# Patient Record
Sex: FEMALE | Race: WHITE | NOT HISPANIC OR LATINO | URBAN - METROPOLITAN AREA
[De-identification: names, ages, dates, MRNs, and addresses within clinical notes are randomized per-mention and may not be internally consistent; named-entity substitution may affect disease eponyms.]

---

## 2017-01-21 ENCOUNTER — INPATIENT (INPATIENT)
Facility: HOSPITAL | Age: 42
LOS: 17 days | Discharge: HOME | End: 2017-02-08
Attending: PSYCHIATRY & NEUROLOGY | Admitting: PSYCHIATRY & NEUROLOGY
Payer: SELF-PAY

## 2017-01-21 VITALS — WEIGHT: 200.62 LBS | TEMPERATURE: 98 F | HEIGHT: 66 IN

## 2017-01-21 DIAGNOSIS — F39 UNSPECIFIED MOOD [AFFECTIVE] DISORDER: ICD-10-CM

## 2017-01-21 RX ORDER — NICOTINE POLACRILEX 2 MG
2 GUM BUCCAL
Qty: 0 | Refills: 0 | Status: DISCONTINUED | OUTPATIENT
Start: 2017-01-21 | End: 2017-02-08

## 2017-01-21 RX ORDER — VENLAFAXINE HCL 75 MG
225 CAPSULE, EXT RELEASE 24 HR ORAL DAILY
Qty: 0 | Refills: 0 | Status: DISCONTINUED | OUTPATIENT
Start: 2017-01-21 | End: 2017-02-08

## 2017-01-21 RX ORDER — PANTOPRAZOLE SODIUM 20 MG/1
40 TABLET, DELAYED RELEASE ORAL
Qty: 0 | Refills: 0 | Status: DISCONTINUED | OUTPATIENT
Start: 2017-01-21 | End: 2017-02-08

## 2017-01-21 RX ORDER — ALBUTEROL 90 UG/1
2 AEROSOL, METERED ORAL EVERY 6 HOURS
Qty: 0 | Refills: 0 | Status: DISCONTINUED | OUTPATIENT
Start: 2017-01-21 | End: 2017-02-08

## 2017-01-21 RX ORDER — FERROUS SULFATE 325(65) MG
325 TABLET ORAL DAILY
Qty: 0 | Refills: 0 | Status: DISCONTINUED | OUTPATIENT
Start: 2017-01-21 | End: 2017-02-08

## 2017-01-21 RX ORDER — LANOLIN ALCOHOL/MO/W.PET/CERES
6 CREAM (GRAM) TOPICAL AT BEDTIME
Qty: 0 | Refills: 0 | Status: DISCONTINUED | OUTPATIENT
Start: 2017-01-21 | End: 2017-02-08

## 2017-01-21 RX ORDER — NICOTINE POLACRILEX 2 MG
2 GUM BUCCAL
Qty: 0 | Refills: 0 | Status: DISCONTINUED | OUTPATIENT
Start: 2017-01-21 | End: 2017-01-21

## 2017-01-21 RX ORDER — QUETIAPINE FUMARATE 200 MG/1
300 TABLET, FILM COATED ORAL AT BEDTIME
Qty: 0 | Refills: 0 | Status: DISCONTINUED | OUTPATIENT
Start: 2017-01-21 | End: 2017-02-01

## 2017-01-21 RX ORDER — NICOTINE POLACRILEX 2 MG
1 GUM BUCCAL DAILY
Qty: 0 | Refills: 0 | Status: DISCONTINUED | OUTPATIENT
Start: 2017-01-21 | End: 2017-02-08

## 2017-01-21 RX ORDER — OXYCODONE HYDROCHLORIDE 5 MG/1
15 TABLET ORAL ONCE
Qty: 0 | Refills: 0 | Status: DISCONTINUED | OUTPATIENT
Start: 2017-01-21 | End: 2017-01-21

## 2017-01-21 RX ORDER — OXYCODONE HYDROCHLORIDE 5 MG/1
30 TABLET ORAL DAILY
Qty: 0 | Refills: 0 | Status: DISCONTINUED | OUTPATIENT
Start: 2017-01-21 | End: 2017-01-23

## 2017-01-21 RX ORDER — HYDROXYZINE HCL 10 MG
50 TABLET ORAL EVERY 6 HOURS
Qty: 0 | Refills: 0 | Status: DISCONTINUED | OUTPATIENT
Start: 2017-01-21 | End: 2017-02-08

## 2017-01-21 RX ORDER — TRAZODONE HCL 50 MG
100 TABLET ORAL AT BEDTIME
Qty: 0 | Refills: 0 | Status: DISCONTINUED | OUTPATIENT
Start: 2017-01-21 | End: 2017-02-08

## 2017-01-21 RX ADMIN — ALBUTEROL 2 PUFF(S): 90 AEROSOL, METERED ORAL at 09:30

## 2017-01-21 RX ADMIN — Medication 1 PATCH: at 08:13

## 2017-01-21 RX ADMIN — Medication 325 MILLIGRAM(S): at 08:13

## 2017-01-21 RX ADMIN — QUETIAPINE FUMARATE 300 MILLIGRAM(S): 200 TABLET, FILM COATED ORAL at 20:30

## 2017-01-21 RX ADMIN — Medication 500 MILLIGRAM(S): at 21:30

## 2017-01-21 RX ADMIN — Medication 225 MILLIGRAM(S): at 08:13

## 2017-01-21 RX ADMIN — OXYCODONE HYDROCHLORIDE 15 MILLIGRAM(S): 5 TABLET ORAL at 16:13

## 2017-01-21 RX ADMIN — Medication 100 MILLIGRAM(S): at 02:44

## 2017-01-21 RX ADMIN — OXYCODONE HYDROCHLORIDE 30 MILLIGRAM(S): 5 TABLET ORAL at 09:13

## 2017-01-21 RX ADMIN — PANTOPRAZOLE SODIUM 40 MILLIGRAM(S): 20 TABLET, DELAYED RELEASE ORAL at 08:13

## 2017-01-21 RX ADMIN — Medication 500 MILLIGRAM(S): at 20:30

## 2017-01-21 RX ADMIN — OXYCODONE HYDROCHLORIDE 15 MILLIGRAM(S): 5 TABLET ORAL at 17:23

## 2017-01-21 RX ADMIN — OXYCODONE HYDROCHLORIDE 30 MILLIGRAM(S): 5 TABLET ORAL at 08:43

## 2017-01-22 LAB
ALBUMIN SERPL ELPH-MCNC: 3.8 G/DL — SIGNIFICANT CHANGE UP (ref 3.3–5)
ALP SERPL-CCNC: 59 U/L — SIGNIFICANT CHANGE UP (ref 40–120)
ALT FLD-CCNC: 15 U/L — SIGNIFICANT CHANGE UP (ref 4–33)
AMPHET UR-MCNC: NEGATIVE — SIGNIFICANT CHANGE UP
ANISOCYTOSIS BLD QL: SLIGHT — SIGNIFICANT CHANGE UP
APPEARANCE UR: CLEAR — SIGNIFICANT CHANGE UP
AST SERPL-CCNC: 12 U/L — SIGNIFICANT CHANGE UP (ref 4–32)
BACTERIA # UR AUTO: SIGNIFICANT CHANGE UP
BARBITURATES UR SCN-MCNC: NEGATIVE — SIGNIFICANT CHANGE UP
BASOPHILS # BLD AUTO: 0.04 K/UL — SIGNIFICANT CHANGE UP (ref 0–0.2)
BASOPHILS NFR BLD AUTO: 0.7 % — SIGNIFICANT CHANGE UP (ref 0–2)
BENZODIAZ UR-MCNC: NEGATIVE — SIGNIFICANT CHANGE UP
BILIRUB SERPL-MCNC: < 0.2 MG/DL — LOW (ref 0.2–1.2)
BILIRUB UR-MCNC: NEGATIVE — SIGNIFICANT CHANGE UP
BLOOD UR QL VISUAL: NEGATIVE — SIGNIFICANT CHANGE UP
BUN SERPL-MCNC: 17 MG/DL — SIGNIFICANT CHANGE UP (ref 7–23)
CALCIUM SERPL-MCNC: 8.8 MG/DL — SIGNIFICANT CHANGE UP (ref 8.4–10.5)
CANNABINOIDS UR-MCNC: POSITIVE — SIGNIFICANT CHANGE UP
CHLORIDE SERPL-SCNC: 102 MMOL/L — SIGNIFICANT CHANGE UP (ref 98–107)
CHOLEST SERPL-MCNC: 136 MG/DL — SIGNIFICANT CHANGE UP (ref 120–199)
CO2 SERPL-SCNC: 24 MMOL/L — SIGNIFICANT CHANGE UP (ref 22–31)
COCAINE METAB.OTHER UR-MCNC: NEGATIVE — SIGNIFICANT CHANGE UP
COLOR SPEC: YELLOW — SIGNIFICANT CHANGE UP
CREAT SERPL-MCNC: 0.5 MG/DL — SIGNIFICANT CHANGE UP (ref 0.5–1.3)
EOSINOPHIL # BLD AUTO: 0.17 K/UL — SIGNIFICANT CHANGE UP (ref 0–0.5)
EOSINOPHIL NFR BLD AUTO: 2.8 % — SIGNIFICANT CHANGE UP (ref 0–6)
FERRITIN SERPL-MCNC: 6.42 NG/ML — LOW (ref 15–150)
FOLATE SERPL-MCNC: 5.1 NG/ML — SIGNIFICANT CHANGE UP (ref 4.7–20)
GLUCOSE SERPL-MCNC: 82 MG/DL — SIGNIFICANT CHANGE UP (ref 70–99)
GLUCOSE UR-MCNC: NEGATIVE — SIGNIFICANT CHANGE UP
HBA1C BLD-MCNC: 5.6 % — SIGNIFICANT CHANGE UP (ref 4–5.6)
HCG SERPL-ACNC: < 5 MIU/ML — SIGNIFICANT CHANGE UP
HCT VFR BLD CALC: 34.4 % — LOW (ref 34.5–45)
HDLC SERPL-MCNC: 44 MG/DL — LOW (ref 45–65)
HGB BLD-MCNC: 9.7 G/DL — LOW (ref 11.5–15.5)
HYPOCHROMIA BLD QL: SLIGHT — SIGNIFICANT CHANGE UP
IMM GRANULOCYTES NFR BLD AUTO: 0.2 % — SIGNIFICANT CHANGE UP (ref 0–1.5)
IRON SATN MFR SERPL: 15 UG/DL — LOW (ref 30–160)
IRON SATN MFR SERPL: 379 UG/DL — SIGNIFICANT CHANGE UP (ref 140–530)
KETONES UR-MCNC: NEGATIVE — SIGNIFICANT CHANGE UP
LEUKOCYTE ESTERASE UR-ACNC: SIGNIFICANT CHANGE UP
LIPID PNL WITH DIRECT LDL SERPL: 77 MG/DL — SIGNIFICANT CHANGE UP
LYMPHOCYTES # BLD AUTO: 2.35 K/UL — SIGNIFICANT CHANGE UP (ref 1–3.3)
LYMPHOCYTES # BLD AUTO: 39.2 % — SIGNIFICANT CHANGE UP (ref 13–44)
MAGNESIUM SERPL-MCNC: 2.1 MG/DL — SIGNIFICANT CHANGE UP (ref 1.6–2.6)
MANUAL SMEAR VERIFICATION: SIGNIFICANT CHANGE UP
MCHC RBC-ENTMCNC: 19 PG — LOW (ref 27–34)
MCHC RBC-ENTMCNC: 28.2 % — LOW (ref 32–36)
MCV RBC AUTO: 67.3 FL — LOW (ref 80–100)
METHADONE UR-MCNC: NEGATIVE — SIGNIFICANT CHANGE UP
MICROCYTES BLD QL: SIGNIFICANT CHANGE UP
MONOCYTES # BLD AUTO: 0.39 K/UL — SIGNIFICANT CHANGE UP (ref 0–0.9)
MONOCYTES NFR BLD AUTO: 6.5 % — SIGNIFICANT CHANGE UP (ref 2–14)
MUCOUS THREADS # UR AUTO: SIGNIFICANT CHANGE UP
NEUTROPHILS # BLD AUTO: 3.04 K/UL — SIGNIFICANT CHANGE UP (ref 1.8–7.4)
NEUTROPHILS NFR BLD AUTO: 50.6 % — SIGNIFICANT CHANGE UP (ref 43–77)
NITRITE UR-MCNC: NEGATIVE — SIGNIFICANT CHANGE UP
NON-SQ EPI CELLS # UR AUTO: <1 — SIGNIFICANT CHANGE UP
OPIATES UR-MCNC: POSITIVE — SIGNIFICANT CHANGE UP
OXYCODONE UR-MCNC: POSITIVE — HIGH
PCP UR-MCNC: NEGATIVE — SIGNIFICANT CHANGE UP
PH UR: 6.5 — SIGNIFICANT CHANGE UP (ref 4.6–8)
PHOSPHATE SERPL-MCNC: 2.4 MG/DL — LOW (ref 2.5–4.5)
PLATELET # BLD AUTO: 338 K/UL — SIGNIFICANT CHANGE UP (ref 150–400)
PLATELET COUNT - ESTIMATE: NORMAL — SIGNIFICANT CHANGE UP
PMV BLD: 9.8 FL — SIGNIFICANT CHANGE UP (ref 7–13)
POLYCHROMASIA BLD QL SMEAR: SLIGHT — SIGNIFICANT CHANGE UP
POTASSIUM SERPL-MCNC: 4.1 MMOL/L — SIGNIFICANT CHANGE UP (ref 3.5–5.3)
POTASSIUM SERPL-SCNC: 4.1 MMOL/L — SIGNIFICANT CHANGE UP (ref 3.5–5.3)
PROT SERPL-MCNC: 6.2 G/DL — SIGNIFICANT CHANGE UP (ref 6–8.3)
PROT UR-MCNC: 20 — SIGNIFICANT CHANGE UP
RBC # BLD: 5.11 M/UL — SIGNIFICANT CHANGE UP (ref 3.8–5.2)
RBC # FLD: 17.5 % — HIGH (ref 10.3–14.5)
RBC CASTS # UR COMP ASSIST: SIGNIFICANT CHANGE UP (ref 0–?)
SODIUM SERPL-SCNC: 140 MMOL/L — SIGNIFICANT CHANGE UP (ref 135–145)
SP GR SPEC: 1.02 — SIGNIFICANT CHANGE UP (ref 1–1.03)
SQUAMOUS # UR AUTO: SIGNIFICANT CHANGE UP
TRIGL SERPL-MCNC: 109 MG/DL — SIGNIFICANT CHANGE UP (ref 10–149)
TSH SERPL-MCNC: 1.25 UIU/ML — SIGNIFICANT CHANGE UP (ref 0.27–4.2)
UIBC SERPL-MCNC: 364 UG/DL — SIGNIFICANT CHANGE UP (ref 110–370)
UROBILINOGEN FLD QL: NORMAL E.U. — SIGNIFICANT CHANGE UP (ref 0.1–0.2)
VIT B12 SERPL-MCNC: 199 PG/ML — LOW (ref 200–900)
WBC # BLD: 6 K/UL — SIGNIFICANT CHANGE UP (ref 3.8–10.5)
WBC # FLD AUTO: 6 K/UL — SIGNIFICANT CHANGE UP (ref 3.8–10.5)
WBC UR QL: HIGH (ref 0–?)

## 2017-01-22 PROCEDURE — 99232 SBSQ HOSP IP/OBS MODERATE 35: CPT

## 2017-01-22 RX ORDER — OXYCODONE HYDROCHLORIDE 5 MG/1
15 TABLET ORAL ONCE
Qty: 0 | Refills: 0 | Status: DISCONTINUED | OUTPATIENT
Start: 2017-01-22 | End: 2017-01-22

## 2017-01-22 RX ADMIN — QUETIAPINE FUMARATE 300 MILLIGRAM(S): 200 TABLET, FILM COATED ORAL at 20:53

## 2017-01-22 RX ADMIN — Medication 50 MILLIGRAM(S): at 17:32

## 2017-01-22 RX ADMIN — Medication 100 MILLIGRAM(S): at 00:00

## 2017-01-22 RX ADMIN — OXYCODONE HYDROCHLORIDE 15 MILLIGRAM(S): 5 TABLET ORAL at 17:31

## 2017-01-22 RX ADMIN — OXYCODONE HYDROCHLORIDE 30 MILLIGRAM(S): 5 TABLET ORAL at 09:40

## 2017-01-22 RX ADMIN — OXYCODONE HYDROCHLORIDE 30 MILLIGRAM(S): 5 TABLET ORAL at 08:01

## 2017-01-22 RX ADMIN — Medication 325 MILLIGRAM(S): at 08:01

## 2017-01-22 RX ADMIN — Medication 1 PATCH: at 08:01

## 2017-01-22 RX ADMIN — Medication 225 MILLIGRAM(S): at 08:01

## 2017-01-22 RX ADMIN — ALBUTEROL 2 PUFF(S): 90 AEROSOL, METERED ORAL at 19:45

## 2017-01-22 RX ADMIN — PANTOPRAZOLE SODIUM 40 MILLIGRAM(S): 20 TABLET, DELAYED RELEASE ORAL at 06:48

## 2017-01-22 RX ADMIN — OXYCODONE HYDROCHLORIDE 15 MILLIGRAM(S): 5 TABLET ORAL at 16:24

## 2017-01-23 PROCEDURE — 99232 SBSQ HOSP IP/OBS MODERATE 35: CPT

## 2017-01-23 RX ORDER — OXYCODONE HYDROCHLORIDE 5 MG/1
20 TABLET ORAL THREE TIMES A DAY
Qty: 0 | Refills: 0 | Status: DISCONTINUED | OUTPATIENT
Start: 2017-01-23 | End: 2017-01-24

## 2017-01-23 RX ORDER — FOLIC ACID 0.8 MG
1 TABLET ORAL DAILY
Qty: 0 | Refills: 0 | Status: DISCONTINUED | OUTPATIENT
Start: 2017-01-23 | End: 2017-02-08

## 2017-01-23 RX ORDER — OXYCODONE HYDROCHLORIDE 5 MG/1
30 TABLET ORAL THREE TIMES A DAY
Qty: 0 | Refills: 0 | Status: DISCONTINUED | OUTPATIENT
Start: 2017-01-23 | End: 2017-01-23

## 2017-01-23 RX ORDER — PREGABALIN 225 MG/1
1000 CAPSULE ORAL DAILY
Qty: 0 | Refills: 0 | Status: COMPLETED | OUTPATIENT
Start: 2017-01-23 | End: 2017-02-02

## 2017-01-23 RX ORDER — OXYCODONE HYDROCHLORIDE 5 MG/1
20 TABLET ORAL THREE TIMES A DAY
Qty: 0 | Refills: 0 | Status: DISCONTINUED | OUTPATIENT
Start: 2017-01-23 | End: 2017-01-23

## 2017-01-23 RX ADMIN — ALBUTEROL 2 PUFF(S): 90 AEROSOL, METERED ORAL at 20:59

## 2017-01-23 RX ADMIN — QUETIAPINE FUMARATE 300 MILLIGRAM(S): 200 TABLET, FILM COATED ORAL at 20:59

## 2017-01-23 RX ADMIN — PANTOPRAZOLE SODIUM 40 MILLIGRAM(S): 20 TABLET, DELAYED RELEASE ORAL at 06:25

## 2017-01-23 RX ADMIN — OXYCODONE HYDROCHLORIDE 20 MILLIGRAM(S): 5 TABLET ORAL at 15:24

## 2017-01-23 RX ADMIN — Medication 1 PATCH: at 08:13

## 2017-01-23 RX ADMIN — Medication 325 MILLIGRAM(S): at 08:13

## 2017-01-23 RX ADMIN — Medication 100 MILLIGRAM(S): at 00:00

## 2017-01-23 RX ADMIN — OXYCODONE HYDROCHLORIDE 20 MILLIGRAM(S): 5 TABLET ORAL at 20:58

## 2017-01-23 RX ADMIN — OXYCODONE HYDROCHLORIDE 30 MILLIGRAM(S): 5 TABLET ORAL at 08:13

## 2017-01-23 RX ADMIN — ALBUTEROL 2 PUFF(S): 90 AEROSOL, METERED ORAL at 14:23

## 2017-01-23 RX ADMIN — Medication 225 MILLIGRAM(S): at 08:13

## 2017-01-23 RX ADMIN — ALBUTEROL 2 PUFF(S): 90 AEROSOL, METERED ORAL at 06:43

## 2017-01-24 PROCEDURE — 99232 SBSQ HOSP IP/OBS MODERATE 35: CPT

## 2017-01-24 RX ORDER — OXYCODONE HYDROCHLORIDE 5 MG/1
30 TABLET ORAL THREE TIMES A DAY
Qty: 0 | Refills: 0 | Status: DISCONTINUED | OUTPATIENT
Start: 2017-01-24 | End: 2017-01-27

## 2017-01-24 RX ADMIN — OXYCODONE HYDROCHLORIDE 20 MILLIGRAM(S): 5 TABLET ORAL at 07:34

## 2017-01-24 RX ADMIN — Medication 100 MILLIGRAM(S): at 20:05

## 2017-01-24 RX ADMIN — OXYCODONE HYDROCHLORIDE 30 MILLIGRAM(S): 5 TABLET ORAL at 20:44

## 2017-01-24 RX ADMIN — OXYCODONE HYDROCHLORIDE 30 MILLIGRAM(S): 5 TABLET ORAL at 12:09

## 2017-01-24 RX ADMIN — PREGABALIN 1000 MICROGRAM(S): 225 CAPSULE ORAL at 11:37

## 2017-01-24 RX ADMIN — ALBUTEROL 2 PUFF(S): 90 AEROSOL, METERED ORAL at 08:03

## 2017-01-24 RX ADMIN — Medication 225 MILLIGRAM(S): at 07:46

## 2017-01-24 RX ADMIN — QUETIAPINE FUMARATE 300 MILLIGRAM(S): 200 TABLET, FILM COATED ORAL at 20:45

## 2017-01-24 RX ADMIN — ALBUTEROL 2 PUFF(S): 90 AEROSOL, METERED ORAL at 20:47

## 2017-01-24 RX ADMIN — OXYCODONE HYDROCHLORIDE 30 MILLIGRAM(S): 5 TABLET ORAL at 12:18

## 2017-01-24 RX ADMIN — OXYCODONE HYDROCHLORIDE 20 MILLIGRAM(S): 5 TABLET ORAL at 07:46

## 2017-01-24 RX ADMIN — Medication 325 MILLIGRAM(S): at 07:46

## 2017-01-24 RX ADMIN — Medication 1 PATCH: at 08:31

## 2017-01-24 RX ADMIN — Medication 1 MILLIGRAM(S): at 07:46

## 2017-01-24 RX ADMIN — Medication 100 MILLIGRAM(S): at 00:56

## 2017-01-24 RX ADMIN — Medication 1 PATCH: at 07:46

## 2017-01-24 RX ADMIN — PANTOPRAZOLE SODIUM 40 MILLIGRAM(S): 20 TABLET, DELAYED RELEASE ORAL at 07:46

## 2017-01-24 RX ADMIN — OXYCODONE HYDROCHLORIDE 20 MILLIGRAM(S): 5 TABLET ORAL at 08:30

## 2017-01-24 RX ADMIN — OXYCODONE HYDROCHLORIDE 30 MILLIGRAM(S): 5 TABLET ORAL at 21:19

## 2017-01-25 PROCEDURE — 99232 SBSQ HOSP IP/OBS MODERATE 35: CPT

## 2017-01-25 RX ADMIN — Medication 1 PATCH: at 08:10

## 2017-01-25 RX ADMIN — OXYCODONE HYDROCHLORIDE 30 MILLIGRAM(S): 5 TABLET ORAL at 22:37

## 2017-01-25 RX ADMIN — OXYCODONE HYDROCHLORIDE 30 MILLIGRAM(S): 5 TABLET ORAL at 09:27

## 2017-01-25 RX ADMIN — PANTOPRAZOLE SODIUM 40 MILLIGRAM(S): 20 TABLET, DELAYED RELEASE ORAL at 08:10

## 2017-01-25 RX ADMIN — OXYCODONE HYDROCHLORIDE 30 MILLIGRAM(S): 5 TABLET ORAL at 08:10

## 2017-01-25 RX ADMIN — Medication 500 MILLIGRAM(S): at 09:51

## 2017-01-25 RX ADMIN — OXYCODONE HYDROCHLORIDE 30 MILLIGRAM(S): 5 TABLET ORAL at 13:01

## 2017-01-25 RX ADMIN — PREGABALIN 1000 MICROGRAM(S): 225 CAPSULE ORAL at 09:06

## 2017-01-25 RX ADMIN — Medication 1 MILLIGRAM(S): at 08:10

## 2017-01-25 RX ADMIN — Medication 500 MILLIGRAM(S): at 10:51

## 2017-01-25 RX ADMIN — ALBUTEROL 2 PUFF(S): 90 AEROSOL, METERED ORAL at 07:55

## 2017-01-25 RX ADMIN — Medication 225 MILLIGRAM(S): at 08:10

## 2017-01-25 RX ADMIN — Medication 325 MILLIGRAM(S): at 08:10

## 2017-01-25 RX ADMIN — OXYCODONE HYDROCHLORIDE 30 MILLIGRAM(S): 5 TABLET ORAL at 14:01

## 2017-01-25 RX ADMIN — OXYCODONE HYDROCHLORIDE 30 MILLIGRAM(S): 5 TABLET ORAL at 21:36

## 2017-01-25 RX ADMIN — QUETIAPINE FUMARATE 300 MILLIGRAM(S): 200 TABLET, FILM COATED ORAL at 21:37

## 2017-01-25 RX ADMIN — Medication 100 MILLIGRAM(S): at 21:00

## 2017-01-26 PROCEDURE — 99232 SBSQ HOSP IP/OBS MODERATE 35: CPT

## 2017-01-26 RX ADMIN — Medication 1 PATCH: at 08:13

## 2017-01-26 RX ADMIN — Medication 1 MILLIGRAM(S): at 08:13

## 2017-01-26 RX ADMIN — QUETIAPINE FUMARATE 300 MILLIGRAM(S): 200 TABLET, FILM COATED ORAL at 22:34

## 2017-01-26 RX ADMIN — OXYCODONE HYDROCHLORIDE 30 MILLIGRAM(S): 5 TABLET ORAL at 22:34

## 2017-01-26 RX ADMIN — PANTOPRAZOLE SODIUM 40 MILLIGRAM(S): 20 TABLET, DELAYED RELEASE ORAL at 06:36

## 2017-01-26 RX ADMIN — Medication 500 MILLIGRAM(S): at 07:16

## 2017-01-26 RX ADMIN — Medication 225 MILLIGRAM(S): at 08:13

## 2017-01-26 RX ADMIN — OXYCODONE HYDROCHLORIDE 30 MILLIGRAM(S): 5 TABLET ORAL at 12:53

## 2017-01-26 RX ADMIN — Medication 100 MILLIGRAM(S): at 22:36

## 2017-01-26 RX ADMIN — OXYCODONE HYDROCHLORIDE 30 MILLIGRAM(S): 5 TABLET ORAL at 08:13

## 2017-01-26 RX ADMIN — OXYCODONE HYDROCHLORIDE 30 MILLIGRAM(S): 5 TABLET ORAL at 13:53

## 2017-01-26 RX ADMIN — Medication 325 MILLIGRAM(S): at 08:13

## 2017-01-26 RX ADMIN — Medication 500 MILLIGRAM(S): at 19:30

## 2017-01-26 RX ADMIN — OXYCODONE HYDROCHLORIDE 30 MILLIGRAM(S): 5 TABLET ORAL at 23:34

## 2017-01-26 RX ADMIN — PREGABALIN 1000 MICROGRAM(S): 225 CAPSULE ORAL at 09:48

## 2017-01-26 RX ADMIN — Medication 500 MILLIGRAM(S): at 20:35

## 2017-01-26 RX ADMIN — Medication 500 MILLIGRAM(S): at 06:15

## 2017-01-26 RX ADMIN — OXYCODONE HYDROCHLORIDE 30 MILLIGRAM(S): 5 TABLET ORAL at 09:52

## 2017-01-27 LAB
HCT VFR BLD CALC: 34.1 % — LOW (ref 34.5–45)
HGB BLD-MCNC: 9.7 G/DL — LOW (ref 11.5–15.5)
MCHC RBC-ENTMCNC: 19.3 PG — LOW (ref 27–34)
MCHC RBC-ENTMCNC: 28.4 % — LOW (ref 32–36)
MCV RBC AUTO: 67.9 FL — LOW (ref 80–100)
PLATELET # BLD AUTO: 350 K/UL — SIGNIFICANT CHANGE UP (ref 150–400)
PMV BLD: 10 FL — SIGNIFICANT CHANGE UP (ref 7–13)
RBC # BLD: 5.02 M/UL — SIGNIFICANT CHANGE UP (ref 3.8–5.2)
RBC # FLD: 19.5 % — HIGH (ref 10.3–14.5)
WBC # BLD: 6.55 K/UL — SIGNIFICANT CHANGE UP (ref 3.8–10.5)
WBC # FLD AUTO: 6.55 K/UL — SIGNIFICANT CHANGE UP (ref 3.8–10.5)

## 2017-01-27 PROCEDURE — 99232 SBSQ HOSP IP/OBS MODERATE 35: CPT

## 2017-01-27 RX ORDER — OXYCODONE HYDROCHLORIDE 5 MG/1
30 TABLET ORAL
Qty: 0 | Refills: 0 | Status: DISCONTINUED | OUTPATIENT
Start: 2017-01-27 | End: 2017-02-03

## 2017-01-27 RX ADMIN — OXYCODONE HYDROCHLORIDE 30 MILLIGRAM(S): 5 TABLET ORAL at 12:05

## 2017-01-27 RX ADMIN — Medication 100 MILLIGRAM(S): at 19:59

## 2017-01-27 RX ADMIN — QUETIAPINE FUMARATE 300 MILLIGRAM(S): 200 TABLET, FILM COATED ORAL at 20:00

## 2017-01-27 RX ADMIN — OXYCODONE HYDROCHLORIDE 30 MILLIGRAM(S): 5 TABLET ORAL at 09:43

## 2017-01-27 RX ADMIN — PANTOPRAZOLE SODIUM 40 MILLIGRAM(S): 20 TABLET, DELAYED RELEASE ORAL at 06:45

## 2017-01-27 RX ADMIN — OXYCODONE HYDROCHLORIDE 30 MILLIGRAM(S): 5 TABLET ORAL at 19:56

## 2017-01-27 RX ADMIN — Medication 1 PATCH: at 08:09

## 2017-01-27 RX ADMIN — OXYCODONE HYDROCHLORIDE 30 MILLIGRAM(S): 5 TABLET ORAL at 08:09

## 2017-01-27 RX ADMIN — Medication 325 MILLIGRAM(S): at 08:09

## 2017-01-27 RX ADMIN — OXYCODONE HYDROCHLORIDE 30 MILLIGRAM(S): 5 TABLET ORAL at 18:19

## 2017-01-27 RX ADMIN — OXYCODONE HYDROCHLORIDE 30 MILLIGRAM(S): 5 TABLET ORAL at 13:02

## 2017-01-27 RX ADMIN — Medication 1 MILLIGRAM(S): at 08:09

## 2017-01-27 RX ADMIN — PREGABALIN 1000 MICROGRAM(S): 225 CAPSULE ORAL at 11:43

## 2017-01-27 RX ADMIN — Medication 225 MILLIGRAM(S): at 08:09

## 2017-01-28 RX ORDER — IBUPROFEN 200 MG
600 TABLET ORAL ONCE
Qty: 0 | Refills: 0 | Status: COMPLETED | OUTPATIENT
Start: 2017-01-28 | End: 2017-01-28

## 2017-01-28 RX ADMIN — OXYCODONE HYDROCHLORIDE 30 MILLIGRAM(S): 5 TABLET ORAL at 12:01

## 2017-01-28 RX ADMIN — PANTOPRAZOLE SODIUM 40 MILLIGRAM(S): 20 TABLET, DELAYED RELEASE ORAL at 06:30

## 2017-01-28 RX ADMIN — PREGABALIN 1000 MICROGRAM(S): 225 CAPSULE ORAL at 08:21

## 2017-01-28 RX ADMIN — OXYCODONE HYDROCHLORIDE 30 MILLIGRAM(S): 5 TABLET ORAL at 18:06

## 2017-01-28 RX ADMIN — Medication 225 MILLIGRAM(S): at 08:22

## 2017-01-28 RX ADMIN — Medication 1 MILLIGRAM(S): at 08:21

## 2017-01-28 RX ADMIN — Medication 1 PATCH: at 08:21

## 2017-01-28 RX ADMIN — Medication 600 MILLIGRAM(S): at 20:41

## 2017-01-28 RX ADMIN — Medication 100 MILLIGRAM(S): at 21:00

## 2017-01-28 RX ADMIN — OXYCODONE HYDROCHLORIDE 30 MILLIGRAM(S): 5 TABLET ORAL at 08:21

## 2017-01-28 RX ADMIN — QUETIAPINE FUMARATE 300 MILLIGRAM(S): 200 TABLET, FILM COATED ORAL at 20:41

## 2017-01-28 RX ADMIN — Medication 325 MILLIGRAM(S): at 08:21

## 2017-01-29 RX ADMIN — Medication 225 MILLIGRAM(S): at 08:23

## 2017-01-29 RX ADMIN — OXYCODONE HYDROCHLORIDE 30 MILLIGRAM(S): 5 TABLET ORAL at 12:04

## 2017-01-29 RX ADMIN — QUETIAPINE FUMARATE 300 MILLIGRAM(S): 200 TABLET, FILM COATED ORAL at 20:28

## 2017-01-29 RX ADMIN — PANTOPRAZOLE SODIUM 40 MILLIGRAM(S): 20 TABLET, DELAYED RELEASE ORAL at 06:57

## 2017-01-29 RX ADMIN — OXYCODONE HYDROCHLORIDE 30 MILLIGRAM(S): 5 TABLET ORAL at 13:35

## 2017-01-29 RX ADMIN — Medication 1 MILLIGRAM(S): at 08:23

## 2017-01-29 RX ADMIN — Medication 50 MILLIGRAM(S): at 20:28

## 2017-01-29 RX ADMIN — OXYCODONE HYDROCHLORIDE 30 MILLIGRAM(S): 5 TABLET ORAL at 08:23

## 2017-01-29 RX ADMIN — PREGABALIN 1000 MICROGRAM(S): 225 CAPSULE ORAL at 09:16

## 2017-01-29 RX ADMIN — Medication 1 PATCH: at 08:23

## 2017-01-29 RX ADMIN — OXYCODONE HYDROCHLORIDE 30 MILLIGRAM(S): 5 TABLET ORAL at 18:00

## 2017-01-29 RX ADMIN — Medication 100 MILLIGRAM(S): at 20:28

## 2017-01-29 RX ADMIN — Medication 325 MILLIGRAM(S): at 08:24

## 2017-01-29 RX ADMIN — OXYCODONE HYDROCHLORIDE 30 MILLIGRAM(S): 5 TABLET ORAL at 09:03

## 2017-01-30 PROCEDURE — 99232 SBSQ HOSP IP/OBS MODERATE 35: CPT

## 2017-01-30 RX ADMIN — OXYCODONE HYDROCHLORIDE 30 MILLIGRAM(S): 5 TABLET ORAL at 13:05

## 2017-01-30 RX ADMIN — Medication 100 MILLIGRAM(S): at 22:03

## 2017-01-30 RX ADMIN — OXYCODONE HYDROCHLORIDE 30 MILLIGRAM(S): 5 TABLET ORAL at 12:03

## 2017-01-30 RX ADMIN — PANTOPRAZOLE SODIUM 40 MILLIGRAM(S): 20 TABLET, DELAYED RELEASE ORAL at 08:03

## 2017-01-30 RX ADMIN — OXYCODONE HYDROCHLORIDE 30 MILLIGRAM(S): 5 TABLET ORAL at 09:30

## 2017-01-30 RX ADMIN — QUETIAPINE FUMARATE 300 MILLIGRAM(S): 200 TABLET, FILM COATED ORAL at 22:03

## 2017-01-30 RX ADMIN — OXYCODONE HYDROCHLORIDE 30 MILLIGRAM(S): 5 TABLET ORAL at 08:02

## 2017-01-30 RX ADMIN — OXYCODONE HYDROCHLORIDE 30 MILLIGRAM(S): 5 TABLET ORAL at 18:03

## 2017-01-30 RX ADMIN — Medication 1 MILLIGRAM(S): at 08:02

## 2017-01-30 RX ADMIN — PREGABALIN 1000 MICROGRAM(S): 225 CAPSULE ORAL at 08:34

## 2017-01-30 RX ADMIN — Medication 225 MILLIGRAM(S): at 08:03

## 2017-01-30 RX ADMIN — OXYCODONE HYDROCHLORIDE 30 MILLIGRAM(S): 5 TABLET ORAL at 20:30

## 2017-01-30 RX ADMIN — Medication 1 PATCH: at 08:02

## 2017-01-30 RX ADMIN — Medication 325 MILLIGRAM(S): at 08:02

## 2017-01-31 PROCEDURE — 99232 SBSQ HOSP IP/OBS MODERATE 35: CPT

## 2017-01-31 RX ADMIN — OXYCODONE HYDROCHLORIDE 30 MILLIGRAM(S): 5 TABLET ORAL at 18:05

## 2017-01-31 RX ADMIN — OXYCODONE HYDROCHLORIDE 30 MILLIGRAM(S): 5 TABLET ORAL at 08:12

## 2017-01-31 RX ADMIN — Medication 325 MILLIGRAM(S): at 08:12

## 2017-01-31 RX ADMIN — Medication 100 MILLIGRAM(S): at 20:45

## 2017-01-31 RX ADMIN — PREGABALIN 1000 MICROGRAM(S): 225 CAPSULE ORAL at 08:12

## 2017-01-31 RX ADMIN — Medication 225 MILLIGRAM(S): at 08:12

## 2017-01-31 RX ADMIN — Medication 1 MILLIGRAM(S): at 08:12

## 2017-01-31 RX ADMIN — PANTOPRAZOLE SODIUM 40 MILLIGRAM(S): 20 TABLET, DELAYED RELEASE ORAL at 06:55

## 2017-01-31 RX ADMIN — OXYCODONE HYDROCHLORIDE 30 MILLIGRAM(S): 5 TABLET ORAL at 12:11

## 2017-01-31 RX ADMIN — Medication 1 PATCH: at 08:12

## 2017-01-31 RX ADMIN — QUETIAPINE FUMARATE 300 MILLIGRAM(S): 200 TABLET, FILM COATED ORAL at 20:54

## 2017-02-01 RX ORDER — QUETIAPINE FUMARATE 200 MG/1
400 TABLET, FILM COATED ORAL AT BEDTIME
Qty: 0 | Refills: 0 | Status: DISCONTINUED | OUTPATIENT
Start: 2017-02-01 | End: 2017-02-08

## 2017-02-01 RX ADMIN — OXYCODONE HYDROCHLORIDE 30 MILLIGRAM(S): 5 TABLET ORAL at 12:52

## 2017-02-01 RX ADMIN — OXYCODONE HYDROCHLORIDE 30 MILLIGRAM(S): 5 TABLET ORAL at 10:15

## 2017-02-01 RX ADMIN — Medication 325 MILLIGRAM(S): at 08:16

## 2017-02-01 RX ADMIN — Medication 1 PATCH: at 07:49

## 2017-02-01 RX ADMIN — OXYCODONE HYDROCHLORIDE 30 MILLIGRAM(S): 5 TABLET ORAL at 07:49

## 2017-02-01 RX ADMIN — Medication 100 MILLIGRAM(S): at 22:26

## 2017-02-01 RX ADMIN — OXYCODONE HYDROCHLORIDE 30 MILLIGRAM(S): 5 TABLET ORAL at 17:12

## 2017-02-01 RX ADMIN — OXYCODONE HYDROCHLORIDE 30 MILLIGRAM(S): 5 TABLET ORAL at 17:29

## 2017-02-01 RX ADMIN — Medication 1 PATCH: at 10:15

## 2017-02-01 RX ADMIN — Medication 600 MILLIGRAM(S): at 07:57

## 2017-02-01 RX ADMIN — Medication 1 PATCH: at 17:12

## 2017-02-01 RX ADMIN — QUETIAPINE FUMARATE 400 MILLIGRAM(S): 200 TABLET, FILM COATED ORAL at 20:50

## 2017-02-01 RX ADMIN — Medication 1 PATCH: at 10:46

## 2017-02-01 RX ADMIN — Medication 225 MILLIGRAM(S): at 08:16

## 2017-02-01 RX ADMIN — OXYCODONE HYDROCHLORIDE 30 MILLIGRAM(S): 5 TABLET ORAL at 16:50

## 2017-02-01 RX ADMIN — PANTOPRAZOLE SODIUM 40 MILLIGRAM(S): 20 TABLET, DELAYED RELEASE ORAL at 08:16

## 2017-02-01 RX ADMIN — OXYCODONE HYDROCHLORIDE 30 MILLIGRAM(S): 5 TABLET ORAL at 08:16

## 2017-02-01 RX ADMIN — OXYCODONE HYDROCHLORIDE 30 MILLIGRAM(S): 5 TABLET ORAL at 12:20

## 2017-02-01 RX ADMIN — PREGABALIN 1000 MICROGRAM(S): 225 CAPSULE ORAL at 08:15

## 2017-02-01 RX ADMIN — Medication 1 MILLIGRAM(S): at 08:16

## 2017-02-02 PROCEDURE — 99232 SBSQ HOSP IP/OBS MODERATE 35: CPT

## 2017-02-02 RX ORDER — HYDROCORTISONE 1 %
1 OINTMENT (GRAM) TOPICAL ONCE
Qty: 0 | Refills: 0 | Status: COMPLETED | OUTPATIENT
Start: 2017-02-02 | End: 2017-02-02

## 2017-02-02 RX ADMIN — OXYCODONE HYDROCHLORIDE 30 MILLIGRAM(S): 5 TABLET ORAL at 08:21

## 2017-02-02 RX ADMIN — PREGABALIN 1000 MICROGRAM(S): 225 CAPSULE ORAL at 09:06

## 2017-02-02 RX ADMIN — OXYCODONE HYDROCHLORIDE 30 MILLIGRAM(S): 5 TABLET ORAL at 12:06

## 2017-02-02 RX ADMIN — OXYCODONE HYDROCHLORIDE 30 MILLIGRAM(S): 5 TABLET ORAL at 18:03

## 2017-02-02 RX ADMIN — OXYCODONE HYDROCHLORIDE 30 MILLIGRAM(S): 5 TABLET ORAL at 18:05

## 2017-02-02 RX ADMIN — Medication 325 MILLIGRAM(S): at 08:21

## 2017-02-02 RX ADMIN — Medication 1 PATCH: at 08:21

## 2017-02-02 RX ADMIN — Medication 1 MILLIGRAM(S): at 08:21

## 2017-02-02 RX ADMIN — Medication 100 MILLIGRAM(S): at 20:37

## 2017-02-02 RX ADMIN — PANTOPRAZOLE SODIUM 40 MILLIGRAM(S): 20 TABLET, DELAYED RELEASE ORAL at 08:21

## 2017-02-02 RX ADMIN — Medication 1 APPLICATION(S): at 18:41

## 2017-02-02 RX ADMIN — QUETIAPINE FUMARATE 400 MILLIGRAM(S): 200 TABLET, FILM COATED ORAL at 20:34

## 2017-02-02 RX ADMIN — Medication 225 MILLIGRAM(S): at 08:21

## 2017-02-03 PROCEDURE — 99232 SBSQ HOSP IP/OBS MODERATE 35: CPT

## 2017-02-03 RX ADMIN — Medication 1 PATCH: at 08:33

## 2017-02-03 RX ADMIN — QUETIAPINE FUMARATE 400 MILLIGRAM(S): 200 TABLET, FILM COATED ORAL at 20:56

## 2017-02-03 RX ADMIN — Medication 100 MILLIGRAM(S): at 20:56

## 2017-02-03 RX ADMIN — Medication 1 MILLIGRAM(S): at 08:01

## 2017-02-03 RX ADMIN — OXYCODONE HYDROCHLORIDE 30 MILLIGRAM(S): 5 TABLET ORAL at 17:33

## 2017-02-03 RX ADMIN — Medication 325 MILLIGRAM(S): at 08:01

## 2017-02-03 RX ADMIN — Medication 225 MILLIGRAM(S): at 08:01

## 2017-02-03 RX ADMIN — PANTOPRAZOLE SODIUM 40 MILLIGRAM(S): 20 TABLET, DELAYED RELEASE ORAL at 08:01

## 2017-02-03 RX ADMIN — Medication 1 PATCH: at 08:01

## 2017-02-03 RX ADMIN — OXYCODONE HYDROCHLORIDE 30 MILLIGRAM(S): 5 TABLET ORAL at 12:24

## 2017-02-03 RX ADMIN — OXYCODONE HYDROCHLORIDE 30 MILLIGRAM(S): 5 TABLET ORAL at 12:06

## 2017-02-03 RX ADMIN — Medication 6 MILLIGRAM(S): at 20:56

## 2017-02-03 RX ADMIN — OXYCODONE HYDROCHLORIDE 30 MILLIGRAM(S): 5 TABLET ORAL at 08:32

## 2017-02-03 RX ADMIN — OXYCODONE HYDROCHLORIDE 30 MILLIGRAM(S): 5 TABLET ORAL at 17:32

## 2017-02-03 RX ADMIN — OXYCODONE HYDROCHLORIDE 30 MILLIGRAM(S): 5 TABLET ORAL at 08:01

## 2017-02-04 RX ORDER — OXYCODONE HYDROCHLORIDE 5 MG/1
30 TABLET ORAL
Qty: 0 | Refills: 0 | Status: DISCONTINUED | OUTPATIENT
Start: 2017-02-04 | End: 2017-02-08

## 2017-02-04 RX ORDER — HYDROCORTISONE 1 %
1 OINTMENT (GRAM) TOPICAL
Qty: 0 | Refills: 0 | Status: DISCONTINUED | OUTPATIENT
Start: 2017-02-04 | End: 2017-02-08

## 2017-02-04 RX ORDER — OXYCODONE HYDROCHLORIDE 5 MG/1
30 TABLET ORAL ONCE
Qty: 0 | Refills: 0 | Status: DISCONTINUED | OUTPATIENT
Start: 2017-02-04 | End: 2017-02-04

## 2017-02-04 RX ADMIN — OXYCODONE HYDROCHLORIDE 30 MILLIGRAM(S): 5 TABLET ORAL at 13:03

## 2017-02-04 RX ADMIN — Medication 1 MILLIGRAM(S): at 08:06

## 2017-02-04 RX ADMIN — PANTOPRAZOLE SODIUM 40 MILLIGRAM(S): 20 TABLET, DELAYED RELEASE ORAL at 06:23

## 2017-02-04 RX ADMIN — Medication 100 MILLIGRAM(S): at 20:56

## 2017-02-04 RX ADMIN — OXYCODONE HYDROCHLORIDE 30 MILLIGRAM(S): 5 TABLET ORAL at 18:03

## 2017-02-04 RX ADMIN — QUETIAPINE FUMARATE 400 MILLIGRAM(S): 200 TABLET, FILM COATED ORAL at 20:53

## 2017-02-04 RX ADMIN — Medication 50 MILLIGRAM(S): at 10:09

## 2017-02-04 RX ADMIN — Medication 1 PATCH: at 08:06

## 2017-02-04 RX ADMIN — Medication 325 MILLIGRAM(S): at 08:06

## 2017-02-04 RX ADMIN — Medication 6 MILLIGRAM(S): at 20:56

## 2017-02-04 RX ADMIN — Medication 1 APPLICATION(S): at 20:53

## 2017-02-04 RX ADMIN — OXYCODONE HYDROCHLORIDE 30 MILLIGRAM(S): 5 TABLET ORAL at 12:07

## 2017-02-04 RX ADMIN — Medication 225 MILLIGRAM(S): at 08:06

## 2017-02-04 RX ADMIN — OXYCODONE HYDROCHLORIDE 30 MILLIGRAM(S): 5 TABLET ORAL at 19:56

## 2017-02-04 RX ADMIN — OXYCODONE HYDROCHLORIDE 30 MILLIGRAM(S): 5 TABLET ORAL at 10:09

## 2017-02-04 RX ADMIN — OXYCODONE HYDROCHLORIDE 30 MILLIGRAM(S): 5 TABLET ORAL at 09:07

## 2017-02-05 RX ADMIN — Medication 325 MILLIGRAM(S): at 08:08

## 2017-02-05 RX ADMIN — Medication 100 MILLIGRAM(S): at 20:49

## 2017-02-05 RX ADMIN — QUETIAPINE FUMARATE 400 MILLIGRAM(S): 200 TABLET, FILM COATED ORAL at 20:49

## 2017-02-05 RX ADMIN — OXYCODONE HYDROCHLORIDE 30 MILLIGRAM(S): 5 TABLET ORAL at 08:07

## 2017-02-05 RX ADMIN — OXYCODONE HYDROCHLORIDE 30 MILLIGRAM(S): 5 TABLET ORAL at 09:10

## 2017-02-05 RX ADMIN — OXYCODONE HYDROCHLORIDE 30 MILLIGRAM(S): 5 TABLET ORAL at 13:15

## 2017-02-05 RX ADMIN — OXYCODONE HYDROCHLORIDE 30 MILLIGRAM(S): 5 TABLET ORAL at 18:25

## 2017-02-05 RX ADMIN — Medication 225 MILLIGRAM(S): at 08:08

## 2017-02-05 RX ADMIN — Medication 1 PATCH: at 08:07

## 2017-02-05 RX ADMIN — Medication 6 MILLIGRAM(S): at 20:49

## 2017-02-05 RX ADMIN — OXYCODONE HYDROCHLORIDE 30 MILLIGRAM(S): 5 TABLET ORAL at 19:44

## 2017-02-05 RX ADMIN — Medication 1 APPLICATION(S): at 08:08

## 2017-02-05 RX ADMIN — Medication 1 MILLIGRAM(S): at 08:08

## 2017-02-05 RX ADMIN — OXYCODONE HYDROCHLORIDE 30 MILLIGRAM(S): 5 TABLET ORAL at 12:15

## 2017-02-05 RX ADMIN — Medication 1 APPLICATION(S): at 20:49

## 2017-02-05 RX ADMIN — PANTOPRAZOLE SODIUM 40 MILLIGRAM(S): 20 TABLET, DELAYED RELEASE ORAL at 06:59

## 2017-02-06 PROCEDURE — 99232 SBSQ HOSP IP/OBS MODERATE 35: CPT

## 2017-02-06 RX ADMIN — Medication 1 PATCH: at 08:20

## 2017-02-06 RX ADMIN — Medication 1 MILLIGRAM(S): at 08:20

## 2017-02-06 RX ADMIN — PANTOPRAZOLE SODIUM 40 MILLIGRAM(S): 20 TABLET, DELAYED RELEASE ORAL at 06:44

## 2017-02-06 RX ADMIN — OXYCODONE HYDROCHLORIDE 30 MILLIGRAM(S): 5 TABLET ORAL at 09:59

## 2017-02-06 RX ADMIN — OXYCODONE HYDROCHLORIDE 30 MILLIGRAM(S): 5 TABLET ORAL at 08:20

## 2017-02-06 RX ADMIN — Medication 325 MILLIGRAM(S): at 08:20

## 2017-02-06 RX ADMIN — Medication 100 MILLIGRAM(S): at 20:54

## 2017-02-06 RX ADMIN — Medication 1 APPLICATION(S): at 08:20

## 2017-02-06 RX ADMIN — QUETIAPINE FUMARATE 400 MILLIGRAM(S): 200 TABLET, FILM COATED ORAL at 20:54

## 2017-02-06 RX ADMIN — OXYCODONE HYDROCHLORIDE 30 MILLIGRAM(S): 5 TABLET ORAL at 18:01

## 2017-02-06 RX ADMIN — Medication 225 MILLIGRAM(S): at 08:20

## 2017-02-06 RX ADMIN — OXYCODONE HYDROCHLORIDE 30 MILLIGRAM(S): 5 TABLET ORAL at 13:05

## 2017-02-06 RX ADMIN — OXYCODONE HYDROCHLORIDE 30 MILLIGRAM(S): 5 TABLET ORAL at 12:02

## 2017-02-06 RX ADMIN — Medication 6 MILLIGRAM(S): at 20:54

## 2017-02-06 RX ADMIN — Medication 50 MILLIGRAM(S): at 14:19

## 2017-02-07 PROCEDURE — 99232 SBSQ HOSP IP/OBS MODERATE 35: CPT

## 2017-02-07 RX ORDER — ALBUTEROL 90 UG/1
2 AEROSOL, METERED ORAL
Qty: 1 | Refills: 0 | OUTPATIENT
Start: 2017-02-07 | End: 2017-03-09

## 2017-02-07 RX ORDER — HYDROCORTISONE 1 %
1 OINTMENT (GRAM) TOPICAL
Qty: 0 | Refills: 0 | COMMUNITY
Start: 2017-02-07

## 2017-02-07 RX ORDER — TOPIRAMATE 25 MG
2 TABLET ORAL
Qty: 60 | Refills: 0 | OUTPATIENT
Start: 2017-02-07 | End: 2017-03-09

## 2017-02-07 RX ORDER — FERROUS SULFATE 325(65) MG
1 TABLET ORAL
Qty: 30 | Refills: 0 | OUTPATIENT
Start: 2017-02-07 | End: 2017-03-09

## 2017-02-07 RX ORDER — TOPIRAMATE 25 MG
50 TABLET ORAL DAILY
Qty: 0 | Refills: 0 | Status: DISCONTINUED | OUTPATIENT
Start: 2017-02-07 | End: 2017-02-08

## 2017-02-07 RX ORDER — TRAZODONE HCL 50 MG
1 TABLET ORAL
Qty: 30 | Refills: 0 | OUTPATIENT
Start: 2017-02-07 | End: 2017-03-09

## 2017-02-07 RX ORDER — OXYCODONE HYDROCHLORIDE 5 MG/1
1 TABLET ORAL
Qty: 45 | Refills: 0 | OUTPATIENT
Start: 2017-02-07 | End: 2017-02-22

## 2017-02-07 RX ORDER — QUETIAPINE FUMARATE 200 MG/1
1 TABLET, FILM COATED ORAL
Qty: 30 | Refills: 0 | OUTPATIENT
Start: 2017-02-07 | End: 2017-03-09

## 2017-02-07 RX ORDER — PANTOPRAZOLE SODIUM 20 MG/1
1 TABLET, DELAYED RELEASE ORAL
Qty: 30 | Refills: 0 | OUTPATIENT
Start: 2017-02-07 | End: 2017-03-09

## 2017-02-07 RX ORDER — OXYCODONE HYDROCHLORIDE 5 MG/1
1 TABLET ORAL
Qty: 60 | Refills: 0 | OUTPATIENT
Start: 2017-02-07 | End: 2017-03-09

## 2017-02-07 RX ORDER — FOLIC ACID 0.8 MG
1 TABLET ORAL
Qty: 30 | Refills: 0 | OUTPATIENT
Start: 2017-02-07 | End: 2017-03-09

## 2017-02-07 RX ORDER — VENLAFAXINE HCL 75 MG
3 CAPSULE, EXT RELEASE 24 HR ORAL
Qty: 90 | Refills: 0 | OUTPATIENT
Start: 2017-02-07 | End: 2017-03-09

## 2017-02-07 RX ADMIN — Medication 1 PATCH: at 08:06

## 2017-02-07 RX ADMIN — OXYCODONE HYDROCHLORIDE 30 MILLIGRAM(S): 5 TABLET ORAL at 18:13

## 2017-02-07 RX ADMIN — OXYCODONE HYDROCHLORIDE 30 MILLIGRAM(S): 5 TABLET ORAL at 12:51

## 2017-02-07 RX ADMIN — Medication 1 MILLIGRAM(S): at 08:06

## 2017-02-07 RX ADMIN — Medication 50 MILLIGRAM(S): at 13:43

## 2017-02-07 RX ADMIN — Medication 325 MILLIGRAM(S): at 08:06

## 2017-02-07 RX ADMIN — OXYCODONE HYDROCHLORIDE 30 MILLIGRAM(S): 5 TABLET ORAL at 14:53

## 2017-02-07 RX ADMIN — OXYCODONE HYDROCHLORIDE 30 MILLIGRAM(S): 5 TABLET ORAL at 09:30

## 2017-02-07 RX ADMIN — Medication 100 MILLIGRAM(S): at 21:24

## 2017-02-07 RX ADMIN — Medication 1 APPLICATION(S): at 08:06

## 2017-02-07 RX ADMIN — QUETIAPINE FUMARATE 400 MILLIGRAM(S): 200 TABLET, FILM COATED ORAL at 21:24

## 2017-02-07 RX ADMIN — OXYCODONE HYDROCHLORIDE 30 MILLIGRAM(S): 5 TABLET ORAL at 08:06

## 2017-02-07 RX ADMIN — PANTOPRAZOLE SODIUM 40 MILLIGRAM(S): 20 TABLET, DELAYED RELEASE ORAL at 08:06

## 2017-02-07 RX ADMIN — Medication 225 MILLIGRAM(S): at 08:06

## 2017-02-08 VITALS — TEMPERATURE: 97 F

## 2017-02-08 PROCEDURE — 99238 HOSP IP/OBS DSCHRG MGMT 30/<: CPT

## 2017-02-08 RX ADMIN — Medication 50 MILLIGRAM(S): at 08:08

## 2017-02-08 RX ADMIN — Medication 1 PATCH: at 08:08

## 2017-02-08 RX ADMIN — Medication 1 APPLICATION(S): at 08:09

## 2017-02-08 RX ADMIN — Medication 1 MILLIGRAM(S): at 08:09

## 2017-02-08 RX ADMIN — OXYCODONE HYDROCHLORIDE 30 MILLIGRAM(S): 5 TABLET ORAL at 08:08

## 2017-02-08 RX ADMIN — Medication 325 MILLIGRAM(S): at 08:09

## 2017-02-08 RX ADMIN — PANTOPRAZOLE SODIUM 40 MILLIGRAM(S): 20 TABLET, DELAYED RELEASE ORAL at 08:08

## 2017-02-08 RX ADMIN — OXYCODONE HYDROCHLORIDE 30 MILLIGRAM(S): 5 TABLET ORAL at 11:53

## 2017-02-08 RX ADMIN — Medication 225 MILLIGRAM(S): at 08:08

## 2017-02-08 RX ADMIN — OXYCODONE HYDROCHLORIDE 30 MILLIGRAM(S): 5 TABLET ORAL at 09:52

## 2017-02-15 ENCOUNTER — INPATIENT (INPATIENT)
Facility: HOSPITAL | Age: 42
LOS: 13 days | Discharge: HOME | End: 2017-03-01
Attending: PSYCHIATRY & NEUROLOGY | Admitting: PSYCHIATRY & NEUROLOGY
Payer: MEDICAID

## 2017-02-15 VITALS — HEIGHT: 66 IN | TEMPERATURE: 98 F | RESPIRATION RATE: 18 BRPM | WEIGHT: 201.06 LBS | OXYGEN SATURATION: 100 %

## 2017-02-15 DIAGNOSIS — F33.9 MAJOR DEPRESSIVE DISORDER, RECURRENT, UNSPECIFIED: ICD-10-CM

## 2017-02-15 LAB
ALBUMIN SERPL ELPH-MCNC: 4.1 G/DL — SIGNIFICANT CHANGE UP (ref 3.3–5)
ALP SERPL-CCNC: 79 U/L — SIGNIFICANT CHANGE UP (ref 40–120)
ALT FLD-CCNC: 16 U/L — SIGNIFICANT CHANGE UP (ref 4–33)
AMPHET UR-MCNC: NEGATIVE — SIGNIFICANT CHANGE UP
ANISOCYTOSIS BLD QL: SIGNIFICANT CHANGE UP
APAP SERPL-MCNC: < 15 UG/ML — LOW (ref 15–25)
APPEARANCE UR: SIGNIFICANT CHANGE UP
AST SERPL-CCNC: 13 U/L — SIGNIFICANT CHANGE UP (ref 4–32)
BACTERIA # UR AUTO: HIGH
BARBITURATES MEASUREMENT: NEGATIVE — SIGNIFICANT CHANGE UP
BARBITURATES UR SCN-MCNC: NEGATIVE — SIGNIFICANT CHANGE UP
BASOPHILS # BLD AUTO: 0.02 K/UL — SIGNIFICANT CHANGE UP (ref 0–0.2)
BASOPHILS NFR BLD AUTO: 0.2 % — SIGNIFICANT CHANGE UP (ref 0–2)
BENZODIAZ SERPL-MCNC: NEGATIVE — SIGNIFICANT CHANGE UP
BENZODIAZ UR-MCNC: NEGATIVE — SIGNIFICANT CHANGE UP
BILIRUB SERPL-MCNC: 0.2 MG/DL — SIGNIFICANT CHANGE UP (ref 0.2–1.2)
BILIRUB UR-MCNC: NEGATIVE — SIGNIFICANT CHANGE UP
BLOOD UR QL VISUAL: HIGH
BUN SERPL-MCNC: 10 MG/DL — SIGNIFICANT CHANGE UP (ref 7–23)
CALCIUM SERPL-MCNC: 9 MG/DL — SIGNIFICANT CHANGE UP (ref 8.4–10.5)
CANNABINOIDS UR-MCNC: POSITIVE — SIGNIFICANT CHANGE UP
CHLORIDE SERPL-SCNC: 107 MMOL/L — SIGNIFICANT CHANGE UP (ref 98–107)
CO2 SERPL-SCNC: 17 MMOL/L — LOW (ref 22–31)
COCAINE METAB.OTHER UR-MCNC: NEGATIVE — SIGNIFICANT CHANGE UP
COLOR SPEC: YELLOW — SIGNIFICANT CHANGE UP
CREAT SERPL-MCNC: 0.53 MG/DL — SIGNIFICANT CHANGE UP (ref 0.5–1.3)
EOSINOPHIL # BLD AUTO: 0.02 K/UL — SIGNIFICANT CHANGE UP (ref 0–0.5)
EOSINOPHIL NFR BLD AUTO: 0.2 % — SIGNIFICANT CHANGE UP (ref 0–6)
ETHANOL BLD-MCNC: < 10 MG/DL — SIGNIFICANT CHANGE UP
GLUCOSE SERPL-MCNC: 120 MG/DL — HIGH (ref 70–99)
GLUCOSE UR-MCNC: NEGATIVE — SIGNIFICANT CHANGE UP
HCG SERPL-ACNC: < 5 MIU/ML — SIGNIFICANT CHANGE UP
HCT VFR BLD CALC: 37.6 % — SIGNIFICANT CHANGE UP (ref 34.5–45)
HGB BLD-MCNC: 11.4 G/DL — LOW (ref 11.5–15.5)
HYPOCHROMIA BLD QL: SIGNIFICANT CHANGE UP
IMM GRANULOCYTES NFR BLD AUTO: 0.1 % — SIGNIFICANT CHANGE UP (ref 0–1.5)
KETONES UR-MCNC: NEGATIVE — SIGNIFICANT CHANGE UP
LEUKOCYTE ESTERASE UR-ACNC: HIGH
LG PLATELETS BLD QL AUTO: SLIGHT — SIGNIFICANT CHANGE UP
LYMPHOCYTES # BLD AUTO: 1.22 K/UL — SIGNIFICANT CHANGE UP (ref 1–3.3)
LYMPHOCYTES # BLD AUTO: 12.7 % — LOW (ref 13–44)
MANUAL SMEAR VERIFICATION: SIGNIFICANT CHANGE UP
MCHC RBC-ENTMCNC: 21.7 PG — LOW (ref 27–34)
MCHC RBC-ENTMCNC: 30.3 % — LOW (ref 32–36)
MCV RBC AUTO: 71.6 FL — LOW (ref 80–100)
METHADONE UR-MCNC: POSITIVE — SIGNIFICANT CHANGE UP
MICROCYTES BLD QL: SIGNIFICANT CHANGE UP
MONOCYTES # BLD AUTO: 0.42 K/UL — SIGNIFICANT CHANGE UP (ref 0–0.9)
MONOCYTES NFR BLD AUTO: 4.4 % — SIGNIFICANT CHANGE UP (ref 2–14)
MUCOUS THREADS # UR AUTO: SIGNIFICANT CHANGE UP
NEUTROPHILS # BLD AUTO: 7.89 K/UL — HIGH (ref 1.8–7.4)
NEUTROPHILS NFR BLD AUTO: 82.4 % — HIGH (ref 43–77)
NITRITE UR-MCNC: NEGATIVE — SIGNIFICANT CHANGE UP
NON-SQ EPI CELLS # UR AUTO: <1 — SIGNIFICANT CHANGE UP
OPIATES UR-MCNC: NEGATIVE — SIGNIFICANT CHANGE UP
OXYCODONE UR-MCNC: POSITIVE — HIGH
PCP UR-MCNC: NEGATIVE — SIGNIFICANT CHANGE UP
PH UR: 6 — SIGNIFICANT CHANGE UP (ref 4.6–8)
PLATELET # BLD AUTO: 313 K/UL — SIGNIFICANT CHANGE UP (ref 150–400)
PLATELET COUNT - ESTIMATE: NORMAL — SIGNIFICANT CHANGE UP
PMV BLD: 9.6 FL — SIGNIFICANT CHANGE UP (ref 7–13)
POLYCHROMASIA BLD QL SMEAR: SLIGHT — SIGNIFICANT CHANGE UP
POTASSIUM SERPL-MCNC: 3.7 MMOL/L — SIGNIFICANT CHANGE UP (ref 3.5–5.3)
POTASSIUM SERPL-SCNC: 3.7 MMOL/L — SIGNIFICANT CHANGE UP (ref 3.5–5.3)
PROT SERPL-MCNC: 7 G/DL — SIGNIFICANT CHANGE UP (ref 6–8.3)
PROT UR-MCNC: 20 — SIGNIFICANT CHANGE UP
RBC # BLD: 5.25 M/UL — HIGH (ref 3.8–5.2)
RBC # FLD: 24.8 % — HIGH (ref 10.3–14.5)
RBC CASTS # UR COMP ASSIST: SIGNIFICANT CHANGE UP (ref 0–?)
SALICYLATES SERPL-MCNC: < 5 MG/DL — LOW (ref 15–30)
SODIUM SERPL-SCNC: 140 MMOL/L — SIGNIFICANT CHANGE UP (ref 135–145)
SP GR SPEC: 1.02 — SIGNIFICANT CHANGE UP (ref 1–1.03)
SQUAMOUS # UR AUTO: SIGNIFICANT CHANGE UP
TSH SERPL-MCNC: 0.46 UIU/ML — SIGNIFICANT CHANGE UP (ref 0.27–4.2)
UROBILINOGEN FLD QL: NORMAL E.U. — SIGNIFICANT CHANGE UP (ref 0.1–0.2)
WBC # BLD: 9.58 K/UL — SIGNIFICANT CHANGE UP (ref 3.8–10.5)
WBC # FLD AUTO: 9.58 K/UL — SIGNIFICANT CHANGE UP (ref 3.8–10.5)
WBC UR QL: >50 — HIGH (ref 0–?)

## 2017-02-15 PROCEDURE — 99285 EMERGENCY DEPT VISIT HI MDM: CPT

## 2017-02-15 RX ORDER — PANTOPRAZOLE SODIUM 20 MG/1
40 TABLET, DELAYED RELEASE ORAL
Qty: 0 | Refills: 0 | Status: DISCONTINUED | OUTPATIENT
Start: 2017-02-15 | End: 2017-03-01

## 2017-02-15 RX ORDER — FERROUS SULFATE 325(65) MG
325 TABLET ORAL DAILY
Qty: 0 | Refills: 0 | Status: DISCONTINUED | OUTPATIENT
Start: 2017-02-15 | End: 2017-02-23

## 2017-02-15 RX ORDER — TOPIRAMATE 25 MG
100 TABLET ORAL DAILY
Qty: 0 | Refills: 0 | Status: DISCONTINUED | OUTPATIENT
Start: 2017-02-15 | End: 2017-03-01

## 2017-02-15 RX ORDER — HYDROXYZINE HCL 10 MG
50 TABLET ORAL EVERY 6 HOURS
Qty: 0 | Refills: 0 | Status: DISCONTINUED | OUTPATIENT
Start: 2017-02-15 | End: 2017-03-01

## 2017-02-15 RX ORDER — DIPHENHYDRAMINE HCL 50 MG
50 CAPSULE ORAL ONCE
Qty: 0 | Refills: 0 | Status: DISCONTINUED | OUTPATIENT
Start: 2017-02-15 | End: 2017-03-01

## 2017-02-15 RX ORDER — VENLAFAXINE HCL 75 MG
225 CAPSULE, EXT RELEASE 24 HR ORAL DAILY
Qty: 0 | Refills: 0 | Status: DISCONTINUED | OUTPATIENT
Start: 2017-02-15 | End: 2017-02-16

## 2017-02-15 RX ORDER — SENNA PLUS 8.6 MG/1
2 TABLET ORAL AT BEDTIME
Qty: 0 | Refills: 0 | Status: DISCONTINUED | OUTPATIENT
Start: 2017-02-15 | End: 2017-03-01

## 2017-02-15 RX ORDER — HALOPERIDOL DECANOATE 100 MG/ML
5 INJECTION INTRAMUSCULAR ONCE
Qty: 0 | Refills: 0 | Status: DISCONTINUED | OUTPATIENT
Start: 2017-02-15 | End: 2017-03-01

## 2017-02-15 RX ORDER — ALBUTEROL 90 UG/1
2 AEROSOL, METERED ORAL EVERY 4 HOURS
Qty: 0 | Refills: 0 | Status: DISCONTINUED | OUTPATIENT
Start: 2017-02-15 | End: 2017-03-01

## 2017-02-15 RX ORDER — TRAZODONE HCL 50 MG
100 TABLET ORAL ONCE
Qty: 0 | Refills: 0 | Status: COMPLETED | OUTPATIENT
Start: 2017-02-15 | End: 2017-02-15

## 2017-02-15 RX ORDER — QUETIAPINE FUMARATE 200 MG/1
400 TABLET, FILM COATED ORAL AT BEDTIME
Qty: 0 | Refills: 0 | Status: DISCONTINUED | OUTPATIENT
Start: 2017-02-15 | End: 2017-03-01

## 2017-02-15 RX ORDER — FOLIC ACID 0.8 MG
1 TABLET ORAL DAILY
Qty: 0 | Refills: 0 | Status: DISCONTINUED | OUTPATIENT
Start: 2017-02-15 | End: 2017-03-01

## 2017-02-15 RX ORDER — DOCUSATE SODIUM 100 MG
100 CAPSULE ORAL
Qty: 0 | Refills: 0 | Status: DISCONTINUED | OUTPATIENT
Start: 2017-02-15 | End: 2017-03-01

## 2017-02-15 RX ORDER — OXYCODONE HYDROCHLORIDE 5 MG/1
30 TABLET ORAL THREE TIMES A DAY
Qty: 0 | Refills: 0 | Status: DISCONTINUED | OUTPATIENT
Start: 2017-02-15 | End: 2017-02-17

## 2017-02-15 RX ADMIN — QUETIAPINE FUMARATE 400 MILLIGRAM(S): 200 TABLET, FILM COATED ORAL at 21:28

## 2017-02-15 RX ADMIN — SENNA PLUS 2 TABLET(S): 8.6 TABLET ORAL at 21:28

## 2017-02-15 RX ADMIN — Medication 100 MILLIGRAM(S): at 21:28

## 2017-02-15 RX ADMIN — OXYCODONE HYDROCHLORIDE 30 MILLIGRAM(S): 5 TABLET ORAL at 16:43

## 2017-02-15 RX ADMIN — OXYCODONE HYDROCHLORIDE 30 MILLIGRAM(S): 5 TABLET ORAL at 17:36

## 2017-02-15 RX ADMIN — Medication 100 MILLIGRAM(S): at 22:11

## 2017-02-15 NOTE — ED BEHAVIORAL HEALTH ASSESSMENT NOTE - HPI (INCLUDE ILLNESS QUALITY, SEVERITY, DURATION, TIMING, CONTEXT, MODIFYING FACTORS, ASSOCIATED SIGNS AND SYMPTOMS)
40 y/o single white female, no dependents, non care giver, not working, history of Major Depressive Disorder, Borderline Personality Disorder, Polysubstance Abuse, history of multiple inpatient psychiatric admissions, last on [facility] for suicidality (d/c 2/8), history of 3 prior suicide attempts by overdose on medication (last in 2007), has a remote hx of SIB. She has no history of aggression/violence. She has a history of legal issues- last arrested in 2004 for marijuana paraphernalia. She has no current or pending legal issues. She has a history of substance abuse- using marijuana and Xanax. She has no history of detox/rehab. PMH chronic pain on pain management presently, PMH of migraines, knee surgery, degenerative disc disease, GERD, anemia, asthma, BIB EMS after patient called 911 as patient felt suicidal and had a plan to overdose on medications in context of severe depressive symptoms and psychosocial stressors.     Patient reports she has not been feeling better since d/c from [facility]. She states when she left she was still depressed and in the past few days the symptoms have worsened to a point that she is now suicidal. She states that she wants to be dead; wants to overdose on medications and feels hopeless. She is tearful in ED, stating she has not been sleeping well, has been very anxious and agitated, despite compliance with her medications. She endorses depression, hopelessness, worthlessness, guilt, poor appetite, insomnia, fatigue, poor concentration, and panic attacks. Patient denies HI/SIB, hypomanic or manic symptoms, AH/VH, paranoia, IOR or any other mental health issues.     Spoke with Dr. Thomason to notify him of readmission. Called Dr. Braga, psychiatrist, to get handoff about patient, but he did not return call as of 1pm.

## 2017-02-15 NOTE — ED BEHAVIORAL HEALTH ASSESSMENT NOTE - DESCRIPTION
patient is anxious, tearful, depressed. She was not aggressive or violent. She did not require PRN medications. chronic pain, migraines, knee surgery, degenerative disc disease, asthma, anemia, GERD see hpi

## 2017-02-15 NOTE — ED BEHAVIORAL HEALTH ASSESSMENT NOTE - PRIMARY DX
MDD (major depressive disorder), recurrent severe, without psychosis Borderline personality disorder
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
## 2017-02-15 NOTE — ED BEHAVIORAL HEALTH ASSESSMENT NOTE - MEDICAL ISSUES AND PLAN (INCLUDE STANDING AND PRN MEDICATION)
Oxycodone 30mg TID, Albuterol PRN, Senna 2 tabs q HS to prevent constipation with opiate use/iron use, Ferrous Sulfate 325mg qd for anemia, Protonix 40mg qd for gerd

## 2017-02-15 NOTE — ED BEHAVIORAL HEALTH NOTE - BEHAVIORAL HEALTH NOTE
DEB informed that pt was recently d/c'd from [facility] and receives outpatient treatment at [facility] with Dr. Corrigan. SW called Dr. Corrigan ext [phone] and left message requesting return call for collateral information. SW additionally met with pt at bedside to discuss alternate sources of collateral information. Pt indicated that she has been unable to get in contact with her family and declined providing contact information.

## 2017-02-15 NOTE — ED PROVIDER NOTE - OBJECTIVE STATEMENT
The patient is a 41y Female hx of MDD, borderline personality disorder, generalized anxiety disorder, asthma (albuterol PRN- one prior intubation) presents to ed for suicidal thoughts and plan to overdose on her meds.  Pt denies self inflicted injuries and denies taking any pills/or ingesting chemicals to cause self harm today, no recent trauma or falls, no headache, back or neck pain, no abd/flank pain, LUTS, nausea or vomiting, no fever or chills, no cp or sob, no palpitations or diaphoresis.  Denies etoh, + occasional MJ, no HI, no AVH.  Endorsed no other symptoms. The patient is a 41y Female hx of MDD, borderline personality disorder, generalized anxiety disorder, iron deficiency anemia, asthma (albuterol PRN- one prior intubation) presents to ed for suicidal thoughts and plan to overdose on her meds.  Pt denies self inflicted injuries and denies taking any pills/or ingesting chemicals to cause self harm today, no recent trauma or falls, no headache, back or neck pain, no abd/flank pain, LUTS, nausea or vomiting, no fever or chills, no cp or sob, no palpitations or diaphoresis.  Denies etoh, + occasional MJ, no HI, no AVH.  Endorsed no other symptoms.

## 2017-02-15 NOTE — ED BEHAVIORAL HEALTH ASSESSMENT NOTE - CASE SUMMARY
40 y/o single white female, no dependents, non care giver, not working, history of Major Depressive Disorder, Borderline Personality Disorder, Polysubstance Abuse, history of multiple inpatient psychiatric admissions, last on [facility] for suicidality (d/c 2/8), history of 3 prior suicide attempts by overdose on medication (last in 2007), has a remote hx of SIB. She has no history of aggression/violence. She has a history of legal issues- last arrested in 2004 for marijuana paraphernalia. She has no current or pending legal issues. She has a history of substance abuse- using marijuana and Xanax. She has no history of detox/rehab. PMH chronic pain on pain management presently, PMH of migraines, knee surgery, degenerative disc disease, GERD, anemia, asthma, BIB EMS after patient called 911 as patient felt suicidal and had a plan to overdose on medications in context of severe depressive symptoms and psychosocial stressors.     Patient tearful, reports suicidal ideation with plan to overdose - has a history of multiple prior overdoses, cannot contract for safety. Amenable to voluntary inpatient admission for safety and stabilization with above plan.

## 2017-02-15 NOTE — ED BEHAVIORAL HEALTH ASSESSMENT NOTE - SUMMARY
42 y/o single white female, history of Major Depressive Disorder, Borderline Personality Disorder, Polysubstance Abuse, history of multiple inpatient psychiatric admissions, history of 3 prior suicide attempts by overdose on medication (last in 2007), BIB EMS after patient called 911 as patient felt suicidal and had a plan to overdose on medications in context of severe depressive symptoms.    In ED, patient is depressed, tearful, overwhelmed and anxious. She is stating she is suicidal with plan to kill self by overdose on pills. Patient has had multiple overdoses in the past when depressed, is not able to participate in safety planning. While there are questions of secondary gain (inpt psychiatrist suggested patient may have run out of pain medications), at present this patient is emotionally labile and stating she is hopeless. Patient will be admitted on 9.13 to inpatient unit for safety and stabilization as she presents a risk for suicide.

## 2017-02-15 NOTE — ED BEHAVIORAL HEALTH ASSESSMENT NOTE - PSYCHIATRIC ISSUES AND PLAN (INCLUDE STANDING AND PRN MEDICATION)
Venlafaxine XL 225mg daily, quetiapine 400mg qhs, topiramate 50mg daily, trazodone 100mg qhs for insomnia, Atarax 50mg q 6 hours PRN for anxiety; If agitated and requires IM, give Haldol 5mg/Benadryl 50mg PRN IM

## 2017-02-15 NOTE — ED BEHAVIORAL HEALTH ASSESSMENT NOTE - OTHER PAST PSYCHIATRIC HISTORY (INCLUDE DETAILS REGARDING ONSET, COURSE OF ILLNESS, INPATIENT/OUTPATIENT TREATMENT)
PPH MDD, borderline personality disorder, polysubstance abuse and panic disorder. She has history of multiple inpatient admissions. She has history of 3 past suicide attempts- last in 2007 via OD. Currently enrolled at [facility] with Dr. Corrigan & Ms. Galindo.    While on unit [facility] during last hospitalization patient obtained Xanax and Valium from visitor and took them endorsing boredom and dis-satisfaction with inpatient team and denied suicide attempt.

## 2017-02-15 NOTE — ED PROVIDER NOTE - CHPI ED SYMPTOMS NEG
no homicidal/no disorientation/no agitation/no hallucinations/no weight loss/no weakness/no change in level of consciousness/no paranoia/no confusion

## 2017-02-15 NOTE — ED ADULT NURSE NOTE - OBJECTIVE STATEMENT
Received pt in  pt calm & cooperative verbalizing depression & Si with plan to OD. pt in nad denies Hi/AVh at present eval on going.

## 2017-02-15 NOTE — ED ADULT TRIAGE NOTE - CHIEF COMPLAINT QUOTE
c/o suicidal thoughts x 1 month with worsening last night, denies any drugs or alcohol no HI. PMH: substance abuse, Bipolar, depression, Dysthmia

## 2017-02-15 NOTE — ED BEHAVIORAL HEALTH ASSESSMENT NOTE - CURRENT MEDICATION
Venlafaxine XL 225mg daily, quetiapine 400mg qhs, topiramate 50mg daily, trazodone 100mg qhs for insomnia, Oxycodone 30mg TID, Ferrous Sulfate 325mg qd, Albuterol inhaler PRN, Protonix 40mg qd

## 2017-02-15 NOTE — ED BEHAVIORAL HEALTH ASSESSMENT NOTE - SUICIDE RISK FACTORS
Mood episode/Chronic pain or acute medical issue/Access to means (pills, firearms, etc.)/History of abuse/trauma/Substance abuse/dependence/Agitation/severe anxiety/Hopelessness/Unable to engage in safety planning/Global insomnia

## 2017-02-15 NOTE — ED BEHAVIORAL HEALTH ASSESSMENT NOTE - RISK ASSESSMENT
patient presents an imminent risk to self as evidence by suicidal ideation with plans and means, unable to contract for safety, substance abuse, limited social supports, hopelessness, depression, insomnia, poor appetite, severe anxiety, history of legal issues, history of suicide attempts and recent discharge from inpatient hospitalization.     protective- no benito, no psychosis, no homicidal thoughts, treatment seeking, no aggression, no violence

## 2017-02-15 NOTE — ED PROVIDER NOTE - MEDICAL DECISION MAKING DETAILS
41y Female hx of MDD, borderline personality disorder, generalized anxiety disorder, asthma (albuterol PRN- one prior intubation) presents to ed for suicidal thoughts and plan to overdose on her meds.  Pt is well appearing at present, no visible injuries on exam-   Will check CBC w/diff to eval for anemia, leukocytosis  CMP-eval for electrolyte abnomality/renal function/ liver function, TSH, Tox, UA, ECG-  Remainder of plan as per psych- 41y Female hx of MDD, borderline personality disorder, generalized anxiety disorder, iron deficiency anemia, asthma (albuterol PRN- one prior intubation) presents to ed for suicidal thoughts and plan to overdose on her meds.  Pt is well appearing at present, no visible injuries on exam-   Will check CBC w/diff to eval for anemia, leukocytosis  CMP-eval for electrolyte abnomality/renal function/ liver function, TSH, Tox, UA, ECG-  Remainder of plan as per psych- 41y Female hx of MDD, borderline personality disorder, generalized anxiety disorder, iron deficiency anemia, asthma (albuterol PRN- one prior intubation) presents to ed for suicidal thoughts and plan to overdose on her meds.  Pt is well appearing at present, no visible injuries on exam-   Will check CBC w/diff to eval for anemia, leukocytosis  CMP-eval for electrolyte abnomality/renal function/ liver function, TSH, Tox, UA, ECG-  Remainder of plan as per psych-  Update: 14:15-  UA with moderate blood and large leuks- Pt is menstruating, no UTI symptoms-  Medically cleared for psych admission.

## 2017-02-15 NOTE — ED BEHAVIORAL HEALTH ASSESSMENT NOTE - AXIS IV
Housing problems/Problems with access to healthcare services/Economic problems/Problem related to social environment/Problems with primary support/Occupational problems

## 2017-02-16 RX ORDER — NICOTINE POLACRILEX 2 MG
1 GUM BUCCAL DAILY
Qty: 0 | Refills: 0 | Status: DISCONTINUED | OUTPATIENT
Start: 2017-02-16 | End: 2017-03-01

## 2017-02-16 RX ORDER — VENLAFAXINE HCL 75 MG
262.5 CAPSULE, EXT RELEASE 24 HR ORAL DAILY
Qty: 0 | Refills: 0 | Status: DISCONTINUED | OUTPATIENT
Start: 2017-02-17 | End: 2017-02-21

## 2017-02-16 RX ORDER — TRAZODONE HCL 50 MG
100 TABLET ORAL ONCE
Qty: 0 | Refills: 0 | Status: DISCONTINUED | OUTPATIENT
Start: 2017-02-16 | End: 2017-03-01

## 2017-02-16 RX ADMIN — OXYCODONE HYDROCHLORIDE 30 MILLIGRAM(S): 5 TABLET ORAL at 14:04

## 2017-02-16 RX ADMIN — OXYCODONE HYDROCHLORIDE 30 MILLIGRAM(S): 5 TABLET ORAL at 09:03

## 2017-02-16 RX ADMIN — OXYCODONE HYDROCHLORIDE 30 MILLIGRAM(S): 5 TABLET ORAL at 22:02

## 2017-02-16 RX ADMIN — OXYCODONE HYDROCHLORIDE 30 MILLIGRAM(S): 5 TABLET ORAL at 10:00

## 2017-02-16 RX ADMIN — Medication 1 PATCH: at 11:28

## 2017-02-16 RX ADMIN — Medication 325 MILLIGRAM(S): at 09:03

## 2017-02-16 RX ADMIN — Medication 225 MILLIGRAM(S): at 09:03

## 2017-02-16 RX ADMIN — Medication 100 MILLIGRAM(S): at 09:03

## 2017-02-16 RX ADMIN — PANTOPRAZOLE SODIUM 40 MILLIGRAM(S): 20 TABLET, DELAYED RELEASE ORAL at 09:03

## 2017-02-16 RX ADMIN — OXYCODONE HYDROCHLORIDE 30 MILLIGRAM(S): 5 TABLET ORAL at 21:07

## 2017-02-16 RX ADMIN — OXYCODONE HYDROCHLORIDE 30 MILLIGRAM(S): 5 TABLET ORAL at 12:56

## 2017-02-16 RX ADMIN — Medication 1 MILLIGRAM(S): at 09:03

## 2017-02-16 RX ADMIN — Medication 100 MILLIGRAM(S): at 21:07

## 2017-02-16 RX ADMIN — ALBUTEROL 2 PUFF(S): 90 AEROSOL, METERED ORAL at 10:49

## 2017-02-16 RX ADMIN — QUETIAPINE FUMARATE 400 MILLIGRAM(S): 200 TABLET, FILM COATED ORAL at 21:07

## 2017-02-17 LAB
BACTERIA UR CULT: SIGNIFICANT CHANGE UP
SPECIMEN SOURCE: SIGNIFICANT CHANGE UP

## 2017-02-17 PROCEDURE — 90853 GROUP PSYCHOTHERAPY: CPT

## 2017-02-17 PROCEDURE — 99232 SBSQ HOSP IP/OBS MODERATE 35: CPT | Mod: 25

## 2017-02-17 RX ORDER — TRAZODONE HCL 50 MG
100 TABLET ORAL AT BEDTIME
Qty: 0 | Refills: 0 | Status: DISCONTINUED | OUTPATIENT
Start: 2017-02-17 | End: 2017-02-17

## 2017-02-17 RX ORDER — TRAZODONE HCL 50 MG
100 TABLET ORAL AT BEDTIME
Qty: 0 | Refills: 0 | Status: DISCONTINUED | OUTPATIENT
Start: 2017-02-17 | End: 2017-03-01

## 2017-02-17 RX ORDER — OXYCODONE HYDROCHLORIDE 5 MG/1
30 TABLET ORAL
Qty: 0 | Refills: 0 | Status: DISCONTINUED | OUTPATIENT
Start: 2017-02-17 | End: 2017-02-23

## 2017-02-17 RX ADMIN — Medication 325 MILLIGRAM(S): at 08:16

## 2017-02-17 RX ADMIN — OXYCODONE HYDROCHLORIDE 30 MILLIGRAM(S): 5 TABLET ORAL at 09:00

## 2017-02-17 RX ADMIN — Medication 1 PATCH: at 08:16

## 2017-02-17 RX ADMIN — QUETIAPINE FUMARATE 400 MILLIGRAM(S): 200 TABLET, FILM COATED ORAL at 21:00

## 2017-02-17 RX ADMIN — Medication 100 MILLIGRAM(S): at 08:16

## 2017-02-17 RX ADMIN — OXYCODONE HYDROCHLORIDE 30 MILLIGRAM(S): 5 TABLET ORAL at 09:30

## 2017-02-17 RX ADMIN — SENNA PLUS 2 TABLET(S): 8.6 TABLET ORAL at 21:00

## 2017-02-17 RX ADMIN — ALBUTEROL 2 PUFF(S): 90 AEROSOL, METERED ORAL at 13:30

## 2017-02-17 RX ADMIN — OXYCODONE HYDROCHLORIDE 30 MILLIGRAM(S): 5 TABLET ORAL at 13:25

## 2017-02-17 RX ADMIN — PANTOPRAZOLE SODIUM 40 MILLIGRAM(S): 20 TABLET, DELAYED RELEASE ORAL at 08:16

## 2017-02-17 RX ADMIN — Medication 262.5 MILLIGRAM(S): at 08:16

## 2017-02-17 RX ADMIN — OXYCODONE HYDROCHLORIDE 30 MILLIGRAM(S): 5 TABLET ORAL at 12:56

## 2017-02-17 RX ADMIN — OXYCODONE HYDROCHLORIDE 30 MILLIGRAM(S): 5 TABLET ORAL at 18:10

## 2017-02-17 RX ADMIN — Medication 1 MILLIGRAM(S): at 08:16

## 2017-02-17 RX ADMIN — OXYCODONE HYDROCHLORIDE 30 MILLIGRAM(S): 5 TABLET ORAL at 17:48

## 2017-02-18 PROCEDURE — 99232 SBSQ HOSP IP/OBS MODERATE 35: CPT

## 2017-02-18 RX ADMIN — Medication 100 MILLIGRAM(S): at 21:14

## 2017-02-18 RX ADMIN — PANTOPRAZOLE SODIUM 40 MILLIGRAM(S): 20 TABLET, DELAYED RELEASE ORAL at 08:09

## 2017-02-18 RX ADMIN — OXYCODONE HYDROCHLORIDE 30 MILLIGRAM(S): 5 TABLET ORAL at 17:39

## 2017-02-18 RX ADMIN — Medication 1 PATCH: at 08:08

## 2017-02-18 RX ADMIN — OXYCODONE HYDROCHLORIDE 30 MILLIGRAM(S): 5 TABLET ORAL at 09:11

## 2017-02-18 RX ADMIN — Medication 1 PATCH: at 08:09

## 2017-02-18 RX ADMIN — OXYCODONE HYDROCHLORIDE 30 MILLIGRAM(S): 5 TABLET ORAL at 13:39

## 2017-02-18 RX ADMIN — SENNA PLUS 2 TABLET(S): 8.6 TABLET ORAL at 21:14

## 2017-02-18 RX ADMIN — OXYCODONE HYDROCHLORIDE 30 MILLIGRAM(S): 5 TABLET ORAL at 08:09

## 2017-02-18 RX ADMIN — Medication 100 MILLIGRAM(S): at 20:51

## 2017-02-18 RX ADMIN — Medication 1 MILLIGRAM(S): at 08:08

## 2017-02-18 RX ADMIN — Medication 100 MILLIGRAM(S): at 08:09

## 2017-02-18 RX ADMIN — OXYCODONE HYDROCHLORIDE 30 MILLIGRAM(S): 5 TABLET ORAL at 12:39

## 2017-02-18 RX ADMIN — Medication 325 MILLIGRAM(S): at 08:08

## 2017-02-18 RX ADMIN — OXYCODONE HYDROCHLORIDE 30 MILLIGRAM(S): 5 TABLET ORAL at 18:27

## 2017-02-18 RX ADMIN — Medication 262.5 MILLIGRAM(S): at 08:09

## 2017-02-18 RX ADMIN — ALBUTEROL 2 PUFF(S): 90 AEROSOL, METERED ORAL at 09:11

## 2017-02-18 RX ADMIN — QUETIAPINE FUMARATE 400 MILLIGRAM(S): 200 TABLET, FILM COATED ORAL at 21:14

## 2017-02-19 PROCEDURE — 99232 SBSQ HOSP IP/OBS MODERATE 35: CPT

## 2017-02-19 RX ADMIN — Medication 100 MILLIGRAM(S): at 08:06

## 2017-02-19 RX ADMIN — Medication 1 MILLIGRAM(S): at 08:05

## 2017-02-19 RX ADMIN — Medication 100 MILLIGRAM(S): at 21:22

## 2017-02-19 RX ADMIN — OXYCODONE HYDROCHLORIDE 30 MILLIGRAM(S): 5 TABLET ORAL at 18:16

## 2017-02-19 RX ADMIN — OXYCODONE HYDROCHLORIDE 30 MILLIGRAM(S): 5 TABLET ORAL at 14:05

## 2017-02-19 RX ADMIN — OXYCODONE HYDROCHLORIDE 30 MILLIGRAM(S): 5 TABLET ORAL at 13:04

## 2017-02-19 RX ADMIN — Medication 1 PATCH: at 08:07

## 2017-02-19 RX ADMIN — ALBUTEROL 2 PUFF(S): 90 AEROSOL, METERED ORAL at 08:32

## 2017-02-19 RX ADMIN — Medication 325 MILLIGRAM(S): at 08:05

## 2017-02-19 RX ADMIN — Medication 100 MILLIGRAM(S): at 08:05

## 2017-02-19 RX ADMIN — OXYCODONE HYDROCHLORIDE 30 MILLIGRAM(S): 5 TABLET ORAL at 09:05

## 2017-02-19 RX ADMIN — OXYCODONE HYDROCHLORIDE 30 MILLIGRAM(S): 5 TABLET ORAL at 19:04

## 2017-02-19 RX ADMIN — QUETIAPINE FUMARATE 400 MILLIGRAM(S): 200 TABLET, FILM COATED ORAL at 20:47

## 2017-02-19 RX ADMIN — Medication 262.5 MILLIGRAM(S): at 08:06

## 2017-02-19 RX ADMIN — PANTOPRAZOLE SODIUM 40 MILLIGRAM(S): 20 TABLET, DELAYED RELEASE ORAL at 08:05

## 2017-02-19 RX ADMIN — OXYCODONE HYDROCHLORIDE 30 MILLIGRAM(S): 5 TABLET ORAL at 08:05

## 2017-02-19 RX ADMIN — Medication 100 MILLIGRAM(S): at 20:47

## 2017-02-19 RX ADMIN — SENNA PLUS 2 TABLET(S): 8.6 TABLET ORAL at 20:47

## 2017-02-20 PROCEDURE — 99232 SBSQ HOSP IP/OBS MODERATE 35: CPT

## 2017-02-20 RX ORDER — IBUPROFEN 200 MG
400 TABLET ORAL EVERY 8 HOURS
Qty: 0 | Refills: 0 | Status: DISCONTINUED | OUTPATIENT
Start: 2017-02-20 | End: 2017-03-01

## 2017-02-20 RX ADMIN — Medication 262.5 MILLIGRAM(S): at 08:12

## 2017-02-20 RX ADMIN — Medication 50 MILLIGRAM(S): at 17:27

## 2017-02-20 RX ADMIN — OXYCODONE HYDROCHLORIDE 30 MILLIGRAM(S): 5 TABLET ORAL at 13:30

## 2017-02-20 RX ADMIN — PANTOPRAZOLE SODIUM 40 MILLIGRAM(S): 20 TABLET, DELAYED RELEASE ORAL at 08:12

## 2017-02-20 RX ADMIN — SENNA PLUS 2 TABLET(S): 8.6 TABLET ORAL at 20:38

## 2017-02-20 RX ADMIN — Medication 100 MILLIGRAM(S): at 08:13

## 2017-02-20 RX ADMIN — Medication 1 PATCH: at 08:12

## 2017-02-20 RX ADMIN — OXYCODONE HYDROCHLORIDE 30 MILLIGRAM(S): 5 TABLET ORAL at 08:42

## 2017-02-20 RX ADMIN — OXYCODONE HYDROCHLORIDE 30 MILLIGRAM(S): 5 TABLET ORAL at 18:31

## 2017-02-20 RX ADMIN — Medication 325 MILLIGRAM(S): at 08:13

## 2017-02-20 RX ADMIN — OXYCODONE HYDROCHLORIDE 30 MILLIGRAM(S): 5 TABLET ORAL at 13:00

## 2017-02-20 RX ADMIN — OXYCODONE HYDROCHLORIDE 30 MILLIGRAM(S): 5 TABLET ORAL at 08:12

## 2017-02-20 RX ADMIN — Medication 1 PATCH: at 08:03

## 2017-02-20 RX ADMIN — OXYCODONE HYDROCHLORIDE 30 MILLIGRAM(S): 5 TABLET ORAL at 18:12

## 2017-02-20 RX ADMIN — QUETIAPINE FUMARATE 400 MILLIGRAM(S): 200 TABLET, FILM COATED ORAL at 20:38

## 2017-02-20 RX ADMIN — Medication 1 MILLIGRAM(S): at 08:12

## 2017-02-20 RX ADMIN — Medication 100 MILLIGRAM(S): at 08:12

## 2017-02-20 RX ADMIN — Medication 100 MILLIGRAM(S): at 20:38

## 2017-02-21 PROCEDURE — 99232 SBSQ HOSP IP/OBS MODERATE 35: CPT

## 2017-02-21 RX ORDER — VENLAFAXINE HCL 75 MG
300 CAPSULE, EXT RELEASE 24 HR ORAL DAILY
Qty: 0 | Refills: 0 | Status: DISCONTINUED | OUTPATIENT
Start: 2017-02-22 | End: 2017-03-01

## 2017-02-21 RX ADMIN — OXYCODONE HYDROCHLORIDE 30 MILLIGRAM(S): 5 TABLET ORAL at 13:10

## 2017-02-21 RX ADMIN — Medication 100 MILLIGRAM(S): at 21:05

## 2017-02-21 RX ADMIN — PANTOPRAZOLE SODIUM 40 MILLIGRAM(S): 20 TABLET, DELAYED RELEASE ORAL at 08:38

## 2017-02-21 RX ADMIN — SENNA PLUS 2 TABLET(S): 8.6 TABLET ORAL at 20:36

## 2017-02-21 RX ADMIN — Medication 1 MILLIGRAM(S): at 08:38

## 2017-02-21 RX ADMIN — Medication 1 PATCH: at 08:38

## 2017-02-21 RX ADMIN — Medication 262.5 MILLIGRAM(S): at 08:38

## 2017-02-21 RX ADMIN — Medication 100 MILLIGRAM(S): at 08:38

## 2017-02-21 RX ADMIN — OXYCODONE HYDROCHLORIDE 30 MILLIGRAM(S): 5 TABLET ORAL at 18:24

## 2017-02-21 RX ADMIN — OXYCODONE HYDROCHLORIDE 30 MILLIGRAM(S): 5 TABLET ORAL at 18:54

## 2017-02-21 RX ADMIN — Medication 325 MILLIGRAM(S): at 08:38

## 2017-02-21 RX ADMIN — OXYCODONE HYDROCHLORIDE 30 MILLIGRAM(S): 5 TABLET ORAL at 08:38

## 2017-02-21 RX ADMIN — Medication 100 MILLIGRAM(S): at 21:04

## 2017-02-21 RX ADMIN — QUETIAPINE FUMARATE 400 MILLIGRAM(S): 200 TABLET, FILM COATED ORAL at 20:36

## 2017-02-22 PROCEDURE — 90832 PSYTX W PT 30 MINUTES: CPT

## 2017-02-22 PROCEDURE — 99232 SBSQ HOSP IP/OBS MODERATE 35: CPT

## 2017-02-22 RX ORDER — PRAZOSIN HCL 2 MG
1 CAPSULE ORAL AT BEDTIME
Qty: 0 | Refills: 0 | Status: DISCONTINUED | OUTPATIENT
Start: 2017-02-22 | End: 2017-03-01

## 2017-02-22 RX ADMIN — OXYCODONE HYDROCHLORIDE 30 MILLIGRAM(S): 5 TABLET ORAL at 18:08

## 2017-02-22 RX ADMIN — OXYCODONE HYDROCHLORIDE 30 MILLIGRAM(S): 5 TABLET ORAL at 18:33

## 2017-02-22 RX ADMIN — Medication 1 PATCH: at 08:59

## 2017-02-22 RX ADMIN — QUETIAPINE FUMARATE 400 MILLIGRAM(S): 200 TABLET, FILM COATED ORAL at 20:50

## 2017-02-22 RX ADMIN — Medication 300 MILLIGRAM(S): at 08:58

## 2017-02-22 RX ADMIN — OXYCODONE HYDROCHLORIDE 30 MILLIGRAM(S): 5 TABLET ORAL at 08:13

## 2017-02-22 RX ADMIN — PANTOPRAZOLE SODIUM 40 MILLIGRAM(S): 20 TABLET, DELAYED RELEASE ORAL at 08:58

## 2017-02-22 RX ADMIN — Medication 1 MILLIGRAM(S): at 20:50

## 2017-02-22 RX ADMIN — Medication 50 MILLIGRAM(S): at 20:50

## 2017-02-22 RX ADMIN — Medication 1 PATCH: at 08:58

## 2017-02-22 RX ADMIN — Medication 100 MILLIGRAM(S): at 20:50

## 2017-02-22 RX ADMIN — OXYCODONE HYDROCHLORIDE 30 MILLIGRAM(S): 5 TABLET ORAL at 13:08

## 2017-02-22 RX ADMIN — Medication 100 MILLIGRAM(S): at 08:58

## 2017-02-22 RX ADMIN — ALBUTEROL 2 PUFF(S): 90 AEROSOL, METERED ORAL at 08:13

## 2017-02-22 RX ADMIN — Medication 325 MILLIGRAM(S): at 08:58

## 2017-02-22 RX ADMIN — Medication 1 MILLIGRAM(S): at 08:58

## 2017-02-22 RX ADMIN — OXYCODONE HYDROCHLORIDE 30 MILLIGRAM(S): 5 TABLET ORAL at 08:59

## 2017-02-22 RX ADMIN — SENNA PLUS 2 TABLET(S): 8.6 TABLET ORAL at 20:50

## 2017-02-23 LAB
HCT VFR BLD CALC: 34.2 % — LOW (ref 34.5–45)
HGB BLD-MCNC: 10.4 G/DL — LOW (ref 11.5–15.5)
IRON SATN MFR SERPL: 75 UG/DL — SIGNIFICANT CHANGE UP (ref 30–160)
MCHC RBC-ENTMCNC: 22.1 PG — LOW (ref 27–34)
MCHC RBC-ENTMCNC: 30.4 % — LOW (ref 32–36)
MCV RBC AUTO: 72.6 FL — LOW (ref 80–100)
PLATELET # BLD AUTO: 259 K/UL — SIGNIFICANT CHANGE UP (ref 150–400)
PMV BLD: 9.5 FL — SIGNIFICANT CHANGE UP (ref 7–13)
RBC # BLD: 4.71 M/UL — SIGNIFICANT CHANGE UP (ref 3.8–5.2)
RBC # FLD: 24.4 % — HIGH (ref 10.3–14.5)
WBC # BLD: 6.3 K/UL — SIGNIFICANT CHANGE UP (ref 3.8–10.5)
WBC # FLD AUTO: 6.3 K/UL — SIGNIFICANT CHANGE UP (ref 3.8–10.5)

## 2017-02-23 PROCEDURE — 99232 SBSQ HOSP IP/OBS MODERATE 35: CPT

## 2017-02-23 RX ORDER — OXYCODONE HYDROCHLORIDE 5 MG/1
30 TABLET ORAL
Qty: 0 | Refills: 0 | Status: DISCONTINUED | OUTPATIENT
Start: 2017-02-23 | End: 2017-02-28

## 2017-02-23 RX ORDER — FERROUS SULFATE 325(65) MG
325 TABLET ORAL
Qty: 0 | Refills: 0 | Status: DISCONTINUED | OUTPATIENT
Start: 2017-02-23 | End: 2017-03-01

## 2017-02-23 RX ADMIN — Medication 100 MILLIGRAM(S): at 21:04

## 2017-02-23 RX ADMIN — OXYCODONE HYDROCHLORIDE 30 MILLIGRAM(S): 5 TABLET ORAL at 18:37

## 2017-02-23 RX ADMIN — OXYCODONE HYDROCHLORIDE 30 MILLIGRAM(S): 5 TABLET ORAL at 08:23

## 2017-02-23 RX ADMIN — OXYCODONE HYDROCHLORIDE 30 MILLIGRAM(S): 5 TABLET ORAL at 17:44

## 2017-02-23 RX ADMIN — PANTOPRAZOLE SODIUM 40 MILLIGRAM(S): 20 TABLET, DELAYED RELEASE ORAL at 08:24

## 2017-02-23 RX ADMIN — Medication 1 MILLIGRAM(S): at 21:04

## 2017-02-23 RX ADMIN — Medication 325 MILLIGRAM(S): at 17:44

## 2017-02-23 RX ADMIN — Medication 1 PATCH: at 08:23

## 2017-02-23 RX ADMIN — Medication 100 MILLIGRAM(S): at 21:06

## 2017-02-23 RX ADMIN — OXYCODONE HYDROCHLORIDE 30 MILLIGRAM(S): 5 TABLET ORAL at 13:30

## 2017-02-23 RX ADMIN — Medication 1 PATCH: at 08:40

## 2017-02-23 RX ADMIN — Medication 325 MILLIGRAM(S): at 08:23

## 2017-02-23 RX ADMIN — Medication 100 MILLIGRAM(S): at 08:24

## 2017-02-23 RX ADMIN — Medication 1 MILLIGRAM(S): at 08:23

## 2017-02-23 RX ADMIN — OXYCODONE HYDROCHLORIDE 30 MILLIGRAM(S): 5 TABLET ORAL at 14:31

## 2017-02-23 RX ADMIN — QUETIAPINE FUMARATE 400 MILLIGRAM(S): 200 TABLET, FILM COATED ORAL at 21:04

## 2017-02-23 RX ADMIN — OXYCODONE HYDROCHLORIDE 30 MILLIGRAM(S): 5 TABLET ORAL at 09:24

## 2017-02-23 RX ADMIN — Medication 100 MILLIGRAM(S): at 08:23

## 2017-02-23 RX ADMIN — Medication 300 MILLIGRAM(S): at 08:24

## 2017-02-23 RX ADMIN — SENNA PLUS 2 TABLET(S): 8.6 TABLET ORAL at 21:04

## 2017-02-23 RX ADMIN — Medication 50 MILLIGRAM(S): at 21:06

## 2017-02-24 PROCEDURE — 90853 GROUP PSYCHOTHERAPY: CPT

## 2017-02-24 PROCEDURE — 99232 SBSQ HOSP IP/OBS MODERATE 35: CPT | Mod: 25

## 2017-02-24 RX ORDER — POLYETHYLENE GLYCOL 3350 17 G/17G
17 POWDER, FOR SOLUTION ORAL EVERY 24 HOURS
Qty: 0 | Refills: 0 | Status: DISCONTINUED | OUTPATIENT
Start: 2017-02-24 | End: 2017-03-01

## 2017-02-24 RX ADMIN — POLYETHYLENE GLYCOL 3350 17 GRAM(S): 17 POWDER, FOR SOLUTION ORAL at 17:08

## 2017-02-24 RX ADMIN — Medication 1 PATCH: at 08:15

## 2017-02-24 RX ADMIN — Medication 300 MILLIGRAM(S): at 09:13

## 2017-02-24 RX ADMIN — Medication 325 MILLIGRAM(S): at 08:15

## 2017-02-24 RX ADMIN — OXYCODONE HYDROCHLORIDE 30 MILLIGRAM(S): 5 TABLET ORAL at 18:00

## 2017-02-24 RX ADMIN — SENNA PLUS 2 TABLET(S): 8.6 TABLET ORAL at 21:12

## 2017-02-24 RX ADMIN — Medication 325 MILLIGRAM(S): at 11:33

## 2017-02-24 RX ADMIN — Medication 100 MILLIGRAM(S): at 21:00

## 2017-02-24 RX ADMIN — Medication 100 MILLIGRAM(S): at 21:12

## 2017-02-24 RX ADMIN — OXYCODONE HYDROCHLORIDE 30 MILLIGRAM(S): 5 TABLET ORAL at 18:25

## 2017-02-24 RX ADMIN — Medication 1 MILLIGRAM(S): at 21:12

## 2017-02-24 RX ADMIN — Medication 1 MILLIGRAM(S): at 08:15

## 2017-02-24 RX ADMIN — QUETIAPINE FUMARATE 400 MILLIGRAM(S): 200 TABLET, FILM COATED ORAL at 21:12

## 2017-02-24 RX ADMIN — OXYCODONE HYDROCHLORIDE 30 MILLIGRAM(S): 5 TABLET ORAL at 08:16

## 2017-02-24 RX ADMIN — Medication 50 MILLIGRAM(S): at 21:00

## 2017-02-24 RX ADMIN — OXYCODONE HYDROCHLORIDE 30 MILLIGRAM(S): 5 TABLET ORAL at 13:00

## 2017-02-24 RX ADMIN — OXYCODONE HYDROCHLORIDE 30 MILLIGRAM(S): 5 TABLET ORAL at 13:33

## 2017-02-24 RX ADMIN — Medication 325 MILLIGRAM(S): at 16:04

## 2017-02-24 RX ADMIN — PANTOPRAZOLE SODIUM 40 MILLIGRAM(S): 20 TABLET, DELAYED RELEASE ORAL at 08:16

## 2017-02-24 RX ADMIN — OXYCODONE HYDROCHLORIDE 30 MILLIGRAM(S): 5 TABLET ORAL at 08:46

## 2017-02-24 RX ADMIN — Medication 1 PATCH: at 08:12

## 2017-02-24 RX ADMIN — Medication 100 MILLIGRAM(S): at 08:15

## 2017-02-24 RX ADMIN — Medication 100 MILLIGRAM(S): at 09:13

## 2017-02-25 RX ADMIN — PANTOPRAZOLE SODIUM 40 MILLIGRAM(S): 20 TABLET, DELAYED RELEASE ORAL at 08:17

## 2017-02-25 RX ADMIN — OXYCODONE HYDROCHLORIDE 30 MILLIGRAM(S): 5 TABLET ORAL at 08:18

## 2017-02-25 RX ADMIN — QUETIAPINE FUMARATE 400 MILLIGRAM(S): 200 TABLET, FILM COATED ORAL at 21:15

## 2017-02-25 RX ADMIN — Medication 1 MILLIGRAM(S): at 21:15

## 2017-02-25 RX ADMIN — Medication 100 MILLIGRAM(S): at 08:19

## 2017-02-25 RX ADMIN — Medication 100 MILLIGRAM(S): at 21:15

## 2017-02-25 RX ADMIN — Medication 1 MILLIGRAM(S): at 08:17

## 2017-02-25 RX ADMIN — Medication 100 MILLIGRAM(S): at 21:00

## 2017-02-25 RX ADMIN — OXYCODONE HYDROCHLORIDE 30 MILLIGRAM(S): 5 TABLET ORAL at 18:00

## 2017-02-25 RX ADMIN — POLYETHYLENE GLYCOL 3350 17 GRAM(S): 17 POWDER, FOR SOLUTION ORAL at 17:09

## 2017-02-25 RX ADMIN — OXYCODONE HYDROCHLORIDE 30 MILLIGRAM(S): 5 TABLET ORAL at 14:06

## 2017-02-25 RX ADMIN — SENNA PLUS 2 TABLET(S): 8.6 TABLET ORAL at 21:15

## 2017-02-25 RX ADMIN — OXYCODONE HYDROCHLORIDE 30 MILLIGRAM(S): 5 TABLET ORAL at 13:09

## 2017-02-25 RX ADMIN — Medication 50 MILLIGRAM(S): at 21:00

## 2017-02-25 RX ADMIN — Medication 1 PATCH: at 08:17

## 2017-02-25 RX ADMIN — OXYCODONE HYDROCHLORIDE 30 MILLIGRAM(S): 5 TABLET ORAL at 08:39

## 2017-02-25 RX ADMIN — Medication 325 MILLIGRAM(S): at 16:05

## 2017-02-25 RX ADMIN — Medication 100 MILLIGRAM(S): at 08:16

## 2017-02-25 RX ADMIN — Medication 325 MILLIGRAM(S): at 11:02

## 2017-02-25 RX ADMIN — Medication 325 MILLIGRAM(S): at 08:17

## 2017-02-25 RX ADMIN — Medication 300 MILLIGRAM(S): at 08:19

## 2017-02-26 RX ADMIN — SENNA PLUS 2 TABLET(S): 8.6 TABLET ORAL at 20:54

## 2017-02-26 RX ADMIN — Medication 50 MILLIGRAM(S): at 19:00

## 2017-02-26 RX ADMIN — ALBUTEROL 2 PUFF(S): 90 AEROSOL, METERED ORAL at 08:53

## 2017-02-26 RX ADMIN — Medication 100 MILLIGRAM(S): at 08:28

## 2017-02-26 RX ADMIN — Medication 1 MILLIGRAM(S): at 08:26

## 2017-02-26 RX ADMIN — Medication 50 MILLIGRAM(S): at 13:08

## 2017-02-26 RX ADMIN — OXYCODONE HYDROCHLORIDE 30 MILLIGRAM(S): 5 TABLET ORAL at 16:51

## 2017-02-26 RX ADMIN — PANTOPRAZOLE SODIUM 40 MILLIGRAM(S): 20 TABLET, DELAYED RELEASE ORAL at 08:27

## 2017-02-26 RX ADMIN — Medication 325 MILLIGRAM(S): at 16:03

## 2017-02-26 RX ADMIN — Medication 325 MILLIGRAM(S): at 08:26

## 2017-02-26 RX ADMIN — Medication 100 MILLIGRAM(S): at 20:54

## 2017-02-26 RX ADMIN — Medication 1 MILLIGRAM(S): at 20:54

## 2017-02-26 RX ADMIN — OXYCODONE HYDROCHLORIDE 30 MILLIGRAM(S): 5 TABLET ORAL at 08:27

## 2017-02-26 RX ADMIN — OXYCODONE HYDROCHLORIDE 30 MILLIGRAM(S): 5 TABLET ORAL at 13:07

## 2017-02-26 RX ADMIN — Medication 300 MILLIGRAM(S): at 08:28

## 2017-02-26 RX ADMIN — Medication 325 MILLIGRAM(S): at 13:06

## 2017-02-26 RX ADMIN — Medication 1 PATCH: at 08:27

## 2017-02-26 RX ADMIN — QUETIAPINE FUMARATE 400 MILLIGRAM(S): 200 TABLET, FILM COATED ORAL at 20:54

## 2017-02-26 RX ADMIN — Medication 100 MILLIGRAM(S): at 08:26

## 2017-02-26 RX ADMIN — OXYCODONE HYDROCHLORIDE 30 MILLIGRAM(S): 5 TABLET ORAL at 17:31

## 2017-02-27 PROCEDURE — 99232 SBSQ HOSP IP/OBS MODERATE 35: CPT

## 2017-02-27 RX ADMIN — SENNA PLUS 2 TABLET(S): 8.6 TABLET ORAL at 20:12

## 2017-02-27 RX ADMIN — Medication 1 PATCH: at 08:16

## 2017-02-27 RX ADMIN — Medication 1 MILLIGRAM(S): at 20:12

## 2017-02-27 RX ADMIN — Medication 325 MILLIGRAM(S): at 08:16

## 2017-02-27 RX ADMIN — Medication 100 MILLIGRAM(S): at 08:16

## 2017-02-27 RX ADMIN — Medication 1 PATCH: at 08:00

## 2017-02-27 RX ADMIN — OXYCODONE HYDROCHLORIDE 30 MILLIGRAM(S): 5 TABLET ORAL at 18:09

## 2017-02-27 RX ADMIN — QUETIAPINE FUMARATE 400 MILLIGRAM(S): 200 TABLET, FILM COATED ORAL at 20:12

## 2017-02-27 RX ADMIN — Medication 50 MILLIGRAM(S): at 17:45

## 2017-02-27 RX ADMIN — PANTOPRAZOLE SODIUM 40 MILLIGRAM(S): 20 TABLET, DELAYED RELEASE ORAL at 08:14

## 2017-02-27 RX ADMIN — Medication 325 MILLIGRAM(S): at 18:09

## 2017-02-27 RX ADMIN — OXYCODONE HYDROCHLORIDE 30 MILLIGRAM(S): 5 TABLET ORAL at 12:38

## 2017-02-27 RX ADMIN — Medication 100 MILLIGRAM(S): at 20:12

## 2017-02-27 RX ADMIN — Medication 325 MILLIGRAM(S): at 12:16

## 2017-02-27 RX ADMIN — OXYCODONE HYDROCHLORIDE 30 MILLIGRAM(S): 5 TABLET ORAL at 09:29

## 2017-02-27 RX ADMIN — OXYCODONE HYDROCHLORIDE 30 MILLIGRAM(S): 5 TABLET ORAL at 08:29

## 2017-02-27 RX ADMIN — Medication 300 MILLIGRAM(S): at 08:18

## 2017-02-27 RX ADMIN — OXYCODONE HYDROCHLORIDE 30 MILLIGRAM(S): 5 TABLET ORAL at 19:20

## 2017-02-27 RX ADMIN — Medication 100 MILLIGRAM(S): at 08:18

## 2017-02-27 RX ADMIN — OXYCODONE HYDROCHLORIDE 30 MILLIGRAM(S): 5 TABLET ORAL at 13:32

## 2017-02-27 RX ADMIN — Medication 1 MILLIGRAM(S): at 08:16

## 2017-02-28 PROCEDURE — 99232 SBSQ HOSP IP/OBS MODERATE 35: CPT

## 2017-02-28 RX ORDER — DOCUSATE SODIUM 100 MG
1 CAPSULE ORAL
Qty: 30 | Refills: 0 | OUTPATIENT
Start: 2017-02-28 | End: 2017-03-15

## 2017-02-28 RX ORDER — PRAZOSIN HCL 2 MG
1 CAPSULE ORAL
Qty: 15 | Refills: 0 | OUTPATIENT
Start: 2017-02-28 | End: 2017-03-15

## 2017-02-28 RX ORDER — QUETIAPINE FUMARATE 200 MG/1
1 TABLET, FILM COATED ORAL
Qty: 15 | Refills: 0 | OUTPATIENT
Start: 2017-02-28 | End: 2017-03-15

## 2017-02-28 RX ORDER — TOPIRAMATE 25 MG
1 TABLET ORAL
Qty: 15 | Refills: 0 | OUTPATIENT
Start: 2017-02-28 | End: 2017-03-15

## 2017-02-28 RX ORDER — NICOTINE POLACRILEX 2 MG
1 GUM BUCCAL
Qty: 15 | Refills: 0 | OUTPATIENT
Start: 2017-02-28 | End: 2017-03-15

## 2017-02-28 RX ORDER — MAGNESIUM HYDROXIDE 400 MG/1
30 TABLET, CHEWABLE ORAL DAILY
Qty: 0 | Refills: 0 | Status: DISCONTINUED | OUTPATIENT
Start: 2017-02-28 | End: 2017-03-01

## 2017-02-28 RX ORDER — VENLAFAXINE HCL 75 MG
2 CAPSULE, EXT RELEASE 24 HR ORAL
Qty: 30 | Refills: 0 | OUTPATIENT
Start: 2017-02-28 | End: 2017-03-15

## 2017-02-28 RX ORDER — PANTOPRAZOLE SODIUM 20 MG/1
1 TABLET, DELAYED RELEASE ORAL
Qty: 15 | Refills: 0 | OUTPATIENT
Start: 2017-02-28 | End: 2017-03-15

## 2017-02-28 RX ORDER — SENNA PLUS 8.6 MG/1
2 TABLET ORAL
Qty: 30 | Refills: 0 | OUTPATIENT
Start: 2017-02-28 | End: 2017-03-15

## 2017-02-28 RX ORDER — FOLIC ACID 0.8 MG
1 TABLET ORAL
Qty: 15 | Refills: 0 | OUTPATIENT
Start: 2017-02-28 | End: 2017-03-15

## 2017-02-28 RX ORDER — TRAZODONE HCL 50 MG
1 TABLET ORAL
Qty: 15 | Refills: 0 | OUTPATIENT
Start: 2017-02-28 | End: 2017-03-15

## 2017-02-28 RX ORDER — OXYCODONE HYDROCHLORIDE 5 MG/1
30 TABLET ORAL
Qty: 0 | Refills: 0 | Status: DISCONTINUED | OUTPATIENT
Start: 2017-02-28 | End: 2017-03-01

## 2017-02-28 RX ORDER — ALBUTEROL 90 UG/1
2 AEROSOL, METERED ORAL
Qty: 1 | Refills: 0 | OUTPATIENT
Start: 2017-02-28 | End: 2017-03-30

## 2017-02-28 RX ORDER — FERROUS SULFATE 325(65) MG
1 TABLET ORAL
Qty: 90 | Refills: 0 | OUTPATIENT
Start: 2017-02-28 | End: 2017-03-30

## 2017-02-28 RX ADMIN — Medication 100 MILLIGRAM(S): at 08:20

## 2017-02-28 RX ADMIN — Medication 1 MILLIGRAM(S): at 20:35

## 2017-02-28 RX ADMIN — Medication 100 MILLIGRAM(S): at 08:21

## 2017-02-28 RX ADMIN — Medication 100 MILLIGRAM(S): at 20:36

## 2017-02-28 RX ADMIN — PANTOPRAZOLE SODIUM 40 MILLIGRAM(S): 20 TABLET, DELAYED RELEASE ORAL at 08:21

## 2017-02-28 RX ADMIN — Medication 325 MILLIGRAM(S): at 12:52

## 2017-02-28 RX ADMIN — Medication 300 MILLIGRAM(S): at 08:20

## 2017-02-28 RX ADMIN — Medication 1 PATCH: at 08:19

## 2017-02-28 RX ADMIN — SENNA PLUS 2 TABLET(S): 8.6 TABLET ORAL at 20:35

## 2017-02-28 RX ADMIN — OXYCODONE HYDROCHLORIDE 30 MILLIGRAM(S): 5 TABLET ORAL at 17:19

## 2017-02-28 RX ADMIN — Medication 1 PATCH: at 08:18

## 2017-02-28 RX ADMIN — QUETIAPINE FUMARATE 400 MILLIGRAM(S): 200 TABLET, FILM COATED ORAL at 20:35

## 2017-02-28 RX ADMIN — Medication 100 MILLIGRAM(S): at 20:34

## 2017-02-28 RX ADMIN — Medication 5 MILLIGRAM(S): at 14:23

## 2017-02-28 RX ADMIN — OXYCODONE HYDROCHLORIDE 30 MILLIGRAM(S): 5 TABLET ORAL at 15:06

## 2017-02-28 RX ADMIN — OXYCODONE HYDROCHLORIDE 30 MILLIGRAM(S): 5 TABLET ORAL at 08:21

## 2017-02-28 RX ADMIN — Medication 325 MILLIGRAM(S): at 17:18

## 2017-02-28 RX ADMIN — Medication 1 MILLIGRAM(S): at 08:21

## 2017-02-28 RX ADMIN — Medication 325 MILLIGRAM(S): at 08:21

## 2017-02-28 RX ADMIN — MAGNESIUM HYDROXIDE 30 MILLILITER(S): 400 TABLET, CHEWABLE ORAL at 14:23

## 2017-02-28 RX ADMIN — OXYCODONE HYDROCHLORIDE 30 MILLIGRAM(S): 5 TABLET ORAL at 12:52

## 2017-02-28 RX ADMIN — Medication 50 MILLIGRAM(S): at 19:01

## 2017-03-01 VITALS — RESPIRATION RATE: 18 BRPM | TEMPERATURE: 98 F

## 2017-03-01 PROCEDURE — 90853 GROUP PSYCHOTHERAPY: CPT

## 2017-03-01 PROCEDURE — 99239 HOSP IP/OBS DSCHRG MGMT >30: CPT | Mod: 25

## 2017-03-01 RX ADMIN — OXYCODONE HYDROCHLORIDE 30 MILLIGRAM(S): 5 TABLET ORAL at 08:10

## 2017-03-01 RX ADMIN — Medication 1 MILLIGRAM(S): at 08:38

## 2017-03-01 RX ADMIN — OXYCODONE HYDROCHLORIDE 30 MILLIGRAM(S): 5 TABLET ORAL at 13:10

## 2017-03-01 RX ADMIN — Medication 1 PATCH: at 08:38

## 2017-03-01 RX ADMIN — OXYCODONE HYDROCHLORIDE 30 MILLIGRAM(S): 5 TABLET ORAL at 11:31

## 2017-03-01 RX ADMIN — OXYCODONE HYDROCHLORIDE 30 MILLIGRAM(S): 5 TABLET ORAL at 08:38

## 2017-03-01 RX ADMIN — PANTOPRAZOLE SODIUM 40 MILLIGRAM(S): 20 TABLET, DELAYED RELEASE ORAL at 08:38

## 2017-03-01 RX ADMIN — Medication 300 MILLIGRAM(S): at 08:39

## 2017-03-01 RX ADMIN — Medication 325 MILLIGRAM(S): at 13:10

## 2017-03-01 RX ADMIN — Medication 100 MILLIGRAM(S): at 08:38

## 2017-03-01 RX ADMIN — Medication 325 MILLIGRAM(S): at 08:38

## 2017-03-01 RX ADMIN — Medication 100 MILLIGRAM(S): at 08:39

## 2017-03-01 RX ADMIN — Medication 5 MILLIGRAM(S): at 13:10

## 2017-03-13 ENCOUNTER — APPOINTMENT (OUTPATIENT)
Dept: OBGYN | Facility: HOSPITAL | Age: 42
End: 2017-03-13

## 2017-03-29 NOTE — ED PROVIDER NOTE - TEMPLATE, MLM
After Visit Summary      Facility     Name Address Phone       [facility] [address] [place] [id number] [phone]            Patient Discharge Summary   3/29/2017    Carmelina Dickerson            Please bring this medication reconciliation form to your next doctor’s appointment(s). Please update the form if you stop taking any of these medications or you start taking any new medications including over the counter medications. Also please carry a copy of this form with you at all times in the event of an emergency.    A copy of these discharge instructions was reviewed with and given to the patient/patient representative/Guardian/Caregiver at discharge.          Allergies as of 3/29/2017     No Known Allergies           Discharge Medications           Your Medications       Start Taking     No Medications Reported      Continue These Medications Which Have Not Changed     ASPIRIN 81 MG TABLET Take 81 mg by mouth daily.    Notes:   --          CHOLECALCIFEROL (VITAMIN D3) 1000 UNITS TABLET Take 2,000 Units by mouth daily.    Notes:   --          LEVOTHYROXINE SODIUM PO Take by mouth daily. Take 1 tablet by mouth every day.   0.025mg    Notes:   --          ONDANSETRON (ZOFRAN) 8 MG TABLET Take 1 tablet by mouth every 8 hours as needed for Nausea.    Notes:   --          PRENATAL MV-MIN-FE FUM-FA-DHA (PRENATAL 1 PO) Take by mouth daily.    Notes:   --            These Medications Have Changed     No Medications Reported      Stop taking these medications, discuss with your pharmacist     No Medications Reported      Facility Administered Medications     No Medications Reported      Your To Do List     3/30/2017 3:30 PM     Appointment with [facility] at [facility] ([phone])   Katy 15 minutes prior to your appointment Please bring the following to your appointment. - 24 hour food history to your appointment. - List of current medications  and the dosages or you may bring in your prescription bottles.  - List of vitamins with you to your appointment.         4/5/2017 9:00 AM     Appointment with ALLEY FORBES 02  at [facility] ([phone])   1. You must have a full bladder before this exam can be performed.   If your bladder is not full, your exam may not start at the scheduled time or the appointment may need to be rescheduled.  Do not urinate  once you have started drinking. If you cannot hold the fluid in your bladder, then only partially empty your bladder. 16 years or older:  Finish drinking 24 oz of water 1 hour prior to your scheduled exam time. 12-15 years old: Finish drinking 12 oz of water 1 hour prior to your scheduled exam time.  2. On the day of your exam wear loose, comfortable clothes.  You may be asked to change into a hospital gown.  3. Please arrive 15 minutes before your scheduled appointment time.    4. Please do not bring children to your appointment unless you have someone to watch them during your exam.  5. During the technical performance of the procedure, the technologist may need to limit the number of visitors allowed in the exam room.   6. No video taping or cameras, including your cell phone,  are allowed in the ultrasound exam room.        4/13/2017 4:20 PM     Appointment with Juli Cates MD at [facility] ([phone])           Discharge Instructions       DIABETES HOME INSTRUCTIONS      MEAL PLANNING:  Dietitian appointment is tomorrow 3-30 at 3#0 pm. Please bring in food log to this appointment .      Physical Activity: Check with your Doctor before  Beginning any exercise program . A short walk after meals will lower blood sugars.       Diabetes Medications: none     Blood Sugar Monitoring; Check 4 times daily, before breakfast and two after each main meal. Check when you are not feeling well. Record all blood sugars in your log book. Take your book  with you each time you see the OB Doctor for their review.     Your Blood sugar Target is :  Fasting Target (before breakfast) =Less then /90mg/dl  Two hour after meal target = Less than 120 two hour after meal    Call your Doctor If Blood Sugar Is: Above target twice in a row , above listed targets twice in one week or less than 60mg/dl.     I will let your Doctor know to order : Blood glucose test strips and lancets as needed.    Goals: Which 1-2 goals are you willing to work on to improve your health?   Have a healthy pregnancy and baby     I will do this by:   Participating in diabetes session/s, Checking my blood sugars.day and follow meal plan.     Call  Dr. Casillas's nurse weekly to report blood sugars, starting next week Wednesday or Thursday.     Thank you, Please call if any questions or concerns.   Amparo Case RN, BSN, CDE  Diabetes Nurse Educator  [phone]                    Discharge References/Attachments     None       Your Opinion Matters To Us  If you receive a patient satisfaction survey in the mail, please complete and return it in the postage-paid envelope.    We truly value and appreciate your feedback.           Carmelina Dickerson        Your Current Medications Are        Details    aspirin 81 MG tablet Take 81 mg by mouth daily.    Prenatal MV-Min-Fe Fum-FA-DHA (PRENATAL 1 PO) Take by mouth daily.    LEVOTHYROXINE SODIUM PO Take by mouth daily. Take 1 tablet by mouth every day.   0.025mg    cholecalciferol (VITAMIN D3) 1000 UNITS tablet Take 2,000 Units by mouth daily.    ondansetron (ZOFRAN) 8 MG tablet Take 1 tablet by mouth every 8 hours as needed for Nausea.       Psych/Behavioral

## 2017-04-17 RX ORDER — CHOLECALCIFEROL (VITAMIN D3) 125 MCG
1 CAPSULE ORAL
Qty: 30 | Refills: 0 | COMMUNITY
Start: 2017-04-17

## 2017-04-17 RX ORDER — CHOLECALCIFEROL (VITAMIN D3) 125 MCG
0 CAPSULE ORAL
Qty: 30 | Refills: 0 | COMMUNITY
Start: 2017-04-17

## 2017-05-08 ENCOUNTER — OUTPATIENT (OUTPATIENT)
Dept: OUTPATIENT SERVICES | Facility: HOSPITAL | Age: 42
LOS: 1 days | Discharge: HOME | End: 2017-05-08

## 2017-05-09 DIAGNOSIS — F11.20 OPIOID DEPENDENCE, UNCOMPLICATED: ICD-10-CM

## 2017-07-06 RX ORDER — GABAPENTIN 400 MG/1
0 CAPSULE ORAL
Qty: 90 | Refills: 0 | COMMUNITY
Start: 2017-07-06

## 2017-07-06 RX ORDER — PANTOPRAZOLE SODIUM 20 MG/1
0 TABLET, DELAYED RELEASE ORAL
Qty: 30 | Refills: 0 | COMMUNITY
Start: 2017-07-06

## 2017-07-06 RX ORDER — ESCITALOPRAM OXALATE 10 MG/1
0 TABLET, FILM COATED ORAL
Qty: 30 | Refills: 0 | COMMUNITY
Start: 2017-07-06

## 2017-07-06 RX ORDER — ESCITALOPRAM OXALATE 10 MG/1
1 TABLET, FILM COATED ORAL
Qty: 30 | Refills: 0 | COMMUNITY
Start: 2017-07-06

## 2017-07-06 RX ORDER — TRAZODONE HCL 50 MG
3 TABLET ORAL
Qty: 90 | Refills: 0 | COMMUNITY
Start: 2017-07-06

## 2017-07-06 RX ORDER — FERROUS SULFATE 325(65) MG
0 TABLET ORAL
Qty: 90 | Refills: 0 | COMMUNITY
Start: 2017-07-06

## 2017-07-06 RX ORDER — GABAPENTIN 400 MG/1
1 CAPSULE ORAL
Qty: 90 | Refills: 0 | COMMUNITY
Start: 2017-07-06

## 2017-07-06 RX ORDER — QUETIAPINE FUMARATE 200 MG/1
0 TABLET, FILM COATED ORAL
Qty: 30 | Refills: 0 | COMMUNITY
Start: 2017-07-06

## 2017-07-06 RX ORDER — TOPIRAMATE 25 MG
0 TABLET ORAL
Qty: 30 | Refills: 0 | COMMUNITY
Start: 2017-07-06

## 2017-07-06 RX ORDER — TRAZODONE HCL 50 MG
0 TABLET ORAL
Qty: 90 | Refills: 0 | COMMUNITY
Start: 2017-07-06

## 2017-07-06 RX ORDER — PRAZOSIN HCL 2 MG
0 CAPSULE ORAL
Qty: 30 | Refills: 0 | COMMUNITY
Start: 2017-07-06

## 2017-07-08 RX ORDER — ALBUTEROL 90 UG/1
0 AEROSOL, METERED ORAL
Qty: 18 | Refills: 0 | COMMUNITY
Start: 2017-07-08

## 2017-07-19 RX ORDER — FUROSEMIDE 40 MG
1 TABLET ORAL
Qty: 10 | Refills: 0 | COMMUNITY
Start: 2017-07-19

## 2017-07-19 RX ORDER — FUROSEMIDE 40 MG
0 TABLET ORAL
Qty: 10 | Refills: 0 | COMMUNITY
Start: 2017-07-19

## 2017-07-21 ENCOUNTER — INPATIENT (INPATIENT)
Facility: HOSPITAL | Age: 42
LOS: 13 days | Discharge: NURSING, REHAB OR LONG-TERM CARE | End: 2017-08-04
Attending: INTERNAL MEDICINE | Admitting: INTERNAL MEDICINE
Payer: MEDICAID

## 2017-07-21 VITALS
HEART RATE: 85 BPM | DIASTOLIC BLOOD PRESSURE: 52 MMHG | RESPIRATION RATE: 17 BRPM | OXYGEN SATURATION: 97 % | SYSTOLIC BLOOD PRESSURE: 112 MMHG | TEMPERATURE: 98 F

## 2017-07-21 LAB
ALBUMIN SERPL ELPH-MCNC: 3.9 G/DL — SIGNIFICANT CHANGE UP (ref 3.3–5)
ALP SERPL-CCNC: 77 U/L — SIGNIFICANT CHANGE UP (ref 40–120)
ALT FLD-CCNC: 21 U/L — SIGNIFICANT CHANGE UP (ref 4–33)
AST SERPL-CCNC: 25 U/L — SIGNIFICANT CHANGE UP (ref 4–32)
BASE EXCESS BLDV CALC-SCNC: 2.8 MMOL/L — SIGNIFICANT CHANGE UP
BASOPHILS # BLD AUTO: 0.04 K/UL — SIGNIFICANT CHANGE UP (ref 0–0.2)
BASOPHILS NFR BLD AUTO: 0.6 % — SIGNIFICANT CHANGE UP (ref 0–2)
BILIRUB SERPL-MCNC: 0.2 MG/DL — SIGNIFICANT CHANGE UP (ref 0.2–1.2)
BLOOD GAS VENOUS - CREATININE: 0.66 MG/DL — SIGNIFICANT CHANGE UP (ref 0.5–1.3)
BUN SERPL-MCNC: 11 MG/DL — SIGNIFICANT CHANGE UP (ref 7–23)
CALCIUM SERPL-MCNC: 9.1 MG/DL — SIGNIFICANT CHANGE UP (ref 8.4–10.5)
CHLORIDE BLDV-SCNC: 110 MMOL/L — HIGH (ref 96–108)
CHLORIDE SERPL-SCNC: 105 MMOL/L — SIGNIFICANT CHANGE UP (ref 98–107)
CO2 SERPL-SCNC: 24 MMOL/L — SIGNIFICANT CHANGE UP (ref 22–31)
CREAT SERPL-MCNC: 0.76 MG/DL — SIGNIFICANT CHANGE UP (ref 0.5–1.3)
CRP SERPL-MCNC: 8.2 MG/L — HIGH (ref 0.3–5)
EOSINOPHIL # BLD AUTO: 0.3 K/UL — SIGNIFICANT CHANGE UP (ref 0–0.5)
EOSINOPHIL NFR BLD AUTO: 4.4 % — SIGNIFICANT CHANGE UP (ref 0–6)
ERYTHROCYTE [SEDIMENTATION RATE] IN BLOOD: 21 MM/HR — SIGNIFICANT CHANGE UP (ref 4–25)
GAS PNL BLDV: 134 MMOL/L — LOW (ref 136–146)
GLUCOSE BLDV-MCNC: 98 — SIGNIFICANT CHANGE UP (ref 70–99)
GLUCOSE SERPL-MCNC: 102 MG/DL — HIGH (ref 70–99)
HBA1C BLD-MCNC: 5.4 % — SIGNIFICANT CHANGE UP (ref 4–5.6)
HCG SERPL-ACNC: < 5 MIU/ML — SIGNIFICANT CHANGE UP
HCO3 BLDV-SCNC: 26 MMOL/L — SIGNIFICANT CHANGE UP (ref 20–27)
HCT VFR BLD CALC: 35.6 % — SIGNIFICANT CHANGE UP (ref 34.5–45)
HCT VFR BLDV CALC: 35.7 % — SIGNIFICANT CHANGE UP (ref 34.5–45)
HGB BLD-MCNC: 11.7 G/DL — SIGNIFICANT CHANGE UP (ref 11.5–15.5)
HGB BLDV-MCNC: 11.6 G/DL — SIGNIFICANT CHANGE UP (ref 11.5–15.5)
IMM GRANULOCYTES # BLD AUTO: 0.03 # — SIGNIFICANT CHANGE UP
IMM GRANULOCYTES NFR BLD AUTO: 0.4 % — SIGNIFICANT CHANGE UP (ref 0–1.5)
LACTATE BLDV-MCNC: 1.2 MMOL/L — SIGNIFICANT CHANGE UP (ref 0.5–2)
LYMPHOCYTES # BLD AUTO: 1.82 K/UL — SIGNIFICANT CHANGE UP (ref 1–3.3)
LYMPHOCYTES # BLD AUTO: 26.5 % — SIGNIFICANT CHANGE UP (ref 13–44)
MCHC RBC-ENTMCNC: 29 PG — SIGNIFICANT CHANGE UP (ref 27–34)
MCHC RBC-ENTMCNC: 32.9 % — SIGNIFICANT CHANGE UP (ref 32–36)
MCV RBC AUTO: 88.1 FL — SIGNIFICANT CHANGE UP (ref 80–100)
MONOCYTES # BLD AUTO: 0.32 K/UL — SIGNIFICANT CHANGE UP (ref 0–0.9)
MONOCYTES NFR BLD AUTO: 4.7 % — SIGNIFICANT CHANGE UP (ref 2–14)
NEUTROPHILS # BLD AUTO: 4.35 K/UL — SIGNIFICANT CHANGE UP (ref 1.8–7.4)
NEUTROPHILS NFR BLD AUTO: 63.4 % — SIGNIFICANT CHANGE UP (ref 43–77)
NRBC # FLD: 0 — SIGNIFICANT CHANGE UP
PCO2 BLDV: 55 MMHG — HIGH (ref 41–51)
PH BLDV: 7.33 PH — SIGNIFICANT CHANGE UP (ref 7.32–7.43)
PLATELET # BLD AUTO: 242 K/UL — SIGNIFICANT CHANGE UP (ref 150–400)
PMV BLD: 9.8 FL — SIGNIFICANT CHANGE UP (ref 7–13)
PO2 BLDV: 39 MMHG — SIGNIFICANT CHANGE UP (ref 35–40)
POTASSIUM BLDV-SCNC: 3.3 MMOL/L — LOW (ref 3.4–4.5)
POTASSIUM SERPL-MCNC: 3.9 MMOL/L — SIGNIFICANT CHANGE UP (ref 3.5–5.3)
POTASSIUM SERPL-SCNC: 3.9 MMOL/L — SIGNIFICANT CHANGE UP (ref 3.5–5.3)
PROT SERPL-MCNC: 6.6 G/DL — SIGNIFICANT CHANGE UP (ref 6–8.3)
RBC # BLD: 4.04 M/UL — SIGNIFICANT CHANGE UP (ref 3.8–5.2)
RBC # FLD: 14.4 % — SIGNIFICANT CHANGE UP (ref 10.3–14.5)
SAO2 % BLDV: 72.1 % — SIGNIFICANT CHANGE UP (ref 60–85)
SODIUM SERPL-SCNC: 143 MMOL/L — SIGNIFICANT CHANGE UP (ref 135–145)
WBC # BLD: 6.86 K/UL — SIGNIFICANT CHANGE UP (ref 3.8–10.5)
WBC # FLD AUTO: 6.86 K/UL — SIGNIFICANT CHANGE UP (ref 3.8–10.5)

## 2017-07-21 PROCEDURE — 73610 X-RAY EXAM OF ANKLE: CPT | Mod: 26,LT

## 2017-07-21 PROCEDURE — 73562 X-RAY EXAM OF KNEE 3: CPT | Mod: 26,LT

## 2017-07-21 PROCEDURE — 93970 EXTREMITY STUDY: CPT | Mod: 26

## 2017-07-21 PROCEDURE — 73630 X-RAY EXAM OF FOOT: CPT | Mod: 26,LT

## 2017-07-21 RX ORDER — PIPERACILLIN AND TAZOBACTAM 4; .5 G/20ML; G/20ML
3.38 INJECTION, POWDER, LYOPHILIZED, FOR SOLUTION INTRAVENOUS ONCE
Qty: 0 | Refills: 0 | Status: COMPLETED | OUTPATIENT
Start: 2017-07-21 | End: 2017-07-21

## 2017-07-21 RX ORDER — VANCOMYCIN HCL 1 G
1000 VIAL (EA) INTRAVENOUS ONCE
Qty: 0 | Refills: 0 | Status: COMPLETED | OUTPATIENT
Start: 2017-07-21 | End: 2017-07-21

## 2017-07-21 RX ADMIN — Medication 250 MILLIGRAM(S): at 20:06

## 2017-07-21 RX ADMIN — PIPERACILLIN AND TAZOBACTAM 200 GRAM(S): 4; .5 INJECTION, POWDER, LYOPHILIZED, FOR SOLUTION INTRAVENOUS at 18:26

## 2017-07-21 NOTE — ED PROVIDER NOTE - OBJECTIVE STATEMENT
41 y/o F pt w pmhx of asthma, anemia, migraine presents with bilateral LE edema (L>R) x 2 weeks, progressively worsening. Seen at [facility] on both 7/17/17 and 7/18/17 had blood work and ultrasound done which was negative for DVT. Currently in rehab center for drug addiction (opioids) states she wasn't able to be seen sooner. Admits to lower leg swelling, pain bilaterally and redness and b/l thigh pain with numbness b/l lateral thigh numbness and burning pain within along with shooting pains. Denies fever, chills, chest pain, weakness, SOB, abdominal pain, dysuria.  edema

## 2017-07-21 NOTE — ED PROVIDER NOTE - ATTENDING CONTRIBUTION TO CARE
Attending Statement: I have reviewed and agree with all pertinent clinical information, including history and physical exam and agree with treatment plan of the PA, except as noted.   41yo F hx of asthma, anemia, migraine, bipolar do, borderline personality do, opoid abuse pw pain, swelling to both Lower ext.  States swelling started two weeks ago, has progressively getting worse. Pain has worsened last two days. no trauma. Endorses erythema and swelling to left leg greater than right. no numbness. no weakness. "shooting pain" along the lateral left thigh. no fever or chills. states that she went to [facility] twice, had neg us of lower ext 5 days ago. no cp or sob.   Vital signs noted. pt anxious, agitated at times.  No resp distress. able to speak in full and clear sentences. no wheeze, rales or stridor. normal S1-S2 +swollen bl lower, left greater than right. swelling from toes to knees w  erythema, warmth. +pulse bl foot.  old scar on the medial left ankle.   plan labs, xr left ankle, us bl Legs, IV abx, admission

## 2017-07-21 NOTE — ED ADULT NURSE NOTE - CHIEF COMPLAINT QUOTE
Pt. c/o bilateral LE edema (L>R) x 2 weeks, progressively worsening. Seen at [facility] on both 7/17/17 and 7/18/17 had blood work and ultrasound done which was negative for DVT. Currently in rehab center for drug addiction, states she wasn't able to be seen sooner. Admits to swelling, pain bilaterally and redness of left leg with shooting pains. Denies fever, chills, chest pain, SOB. Bilateral edema noted, sensation and motor intact.

## 2017-07-21 NOTE — ED ADULT TRIAGE NOTE - CHIEF COMPLAINT QUOTE
Pt. c/o bilateral LE edema (L>R) x 2 weeks, progressively worsening. Seen at [facility] on both 7/17/17 and 7/18/17 had blood work and ultrasound done which was negative for DVT. Currently in rehab center for drug addiction, states she wasn't able to be seen sooner. Admits to swelling, pain bilaterally and redness of left leg with shooting pains. Denies fever, chills, chest pain, SOB. Bilateral edema noted, sensation and motor intact.

## 2017-07-21 NOTE — ED PROVIDER NOTE - PMH
Arthritis    Asthma    Bipolar 1 disorder    Borderline personality disorder    Depression    Dysthymia    Herniated lumbar disc without myelopathy    Substance abuse

## 2017-07-21 NOTE — ED PROVIDER NOTE - MEDICAL DECISION MAKING DETAILS
41 y/o F pt presents with b/l leg swelling, erythema and pain  r/o DVT, possible cellulitis, vascular compromise  cbc, cmp, VBG, Hgb A1c, ESR, CRP, xray, doppler

## 2017-07-21 NOTE — ED ADULT NURSE NOTE - OBJECTIVE STATEMENT
Pt c/o of left leg pain with swelling pt left leg noted erythema, swelling pedal pulse faint but noted no drainage present skin is intact. An iv was accessed labs were sent pt was given her first dose of iv antibiotic.

## 2017-07-21 NOTE — ED ADULT NURSE NOTE - ED STAT RN HANDOFF DETAILS
report given to OFELIA Bishop pt in NAD, awaiting transportation.  Will continue to monitor patient closely.

## 2017-07-22 DIAGNOSIS — L03.90 CELLULITIS, UNSPECIFIED: ICD-10-CM

## 2017-07-22 DIAGNOSIS — F19.10 OTHER PSYCHOACTIVE SUBSTANCE ABUSE, UNCOMPLICATED: ICD-10-CM

## 2017-07-22 DIAGNOSIS — F31.9 BIPOLAR DISORDER, UNSPECIFIED: ICD-10-CM

## 2017-07-22 DIAGNOSIS — Z90.49 ACQUIRED ABSENCE OF OTHER SPECIFIED PARTS OF DIGESTIVE TRACT: Chronic | ICD-10-CM

## 2017-07-22 DIAGNOSIS — L03.119 CELLULITIS OF UNSPECIFIED PART OF LIMB: ICD-10-CM

## 2017-07-22 DIAGNOSIS — J45.20 MILD INTERMITTENT ASTHMA, UNCOMPLICATED: ICD-10-CM

## 2017-07-22 DIAGNOSIS — F51.01 PRIMARY INSOMNIA: ICD-10-CM

## 2017-07-22 DIAGNOSIS — S82.892S OTHER FRACTURE OF LEFT LOWER LEG, SEQUELA: Chronic | ICD-10-CM

## 2017-07-22 DIAGNOSIS — Z29.9 ENCOUNTER FOR PROPHYLACTIC MEASURES, UNSPECIFIED: ICD-10-CM

## 2017-07-22 DIAGNOSIS — F60.3 BORDERLINE PERSONALITY DISORDER: ICD-10-CM

## 2017-07-22 LAB — SPECIMEN SOURCE: SIGNIFICANT CHANGE UP

## 2017-07-22 PROCEDURE — 99223 1ST HOSP IP/OBS HIGH 75: CPT

## 2017-07-22 PROCEDURE — 99222 1ST HOSP IP/OBS MODERATE 55: CPT | Mod: GC

## 2017-07-22 PROCEDURE — 99222 1ST HOSP IP/OBS MODERATE 55: CPT

## 2017-07-22 RX ORDER — ESCITALOPRAM OXALATE 10 MG/1
20 TABLET, FILM COATED ORAL ONCE
Qty: 0 | Refills: 0 | Status: COMPLETED | OUTPATIENT
Start: 2017-07-22 | End: 2017-07-22

## 2017-07-22 RX ORDER — TRAZODONE HCL 50 MG
100 TABLET ORAL ONCE
Qty: 0 | Refills: 0 | Status: DISCONTINUED | OUTPATIENT
Start: 2017-07-22 | End: 2017-07-22

## 2017-07-22 RX ORDER — CHOLECALCIFEROL (VITAMIN D3) 125 MCG
2000 CAPSULE ORAL DAILY
Qty: 0 | Refills: 0 | Status: DISCONTINUED | OUTPATIENT
Start: 2017-07-22 | End: 2017-08-04

## 2017-07-22 RX ORDER — FOLIC ACID 0.8 MG
1 TABLET ORAL DAILY
Qty: 0 | Refills: 0 | Status: DISCONTINUED | OUTPATIENT
Start: 2017-07-22 | End: 2017-07-22

## 2017-07-22 RX ORDER — TOPIRAMATE 25 MG
100 TABLET ORAL DAILY
Qty: 0 | Refills: 0 | Status: DISCONTINUED | OUTPATIENT
Start: 2017-07-22 | End: 2017-08-04

## 2017-07-22 RX ORDER — SENNA PLUS 8.6 MG/1
2 TABLET ORAL AT BEDTIME
Qty: 0 | Refills: 0 | Status: DISCONTINUED | OUTPATIENT
Start: 2017-07-22 | End: 2017-08-04

## 2017-07-22 RX ORDER — FERROUS SULFATE 325(65) MG
325 TABLET ORAL ONCE
Qty: 0 | Refills: 0 | Status: COMPLETED | OUTPATIENT
Start: 2017-07-22 | End: 2017-07-22

## 2017-07-22 RX ORDER — DOCUSATE SODIUM 100 MG
100 CAPSULE ORAL
Qty: 0 | Refills: 0 | Status: DISCONTINUED | OUTPATIENT
Start: 2017-07-22 | End: 2017-08-04

## 2017-07-22 RX ORDER — BUPRENORPHINE AND NALOXONE 2; .5 MG/1; MG/1
4 TABLET SUBLINGUAL DAILY
Qty: 0 | Refills: 0 | Status: DISCONTINUED | OUTPATIENT
Start: 2017-07-22 | End: 2017-07-29

## 2017-07-22 RX ORDER — FUROSEMIDE 40 MG
20 TABLET ORAL DAILY
Qty: 0 | Refills: 0 | Status: DISCONTINUED | OUTPATIENT
Start: 2017-07-22 | End: 2017-07-31

## 2017-07-22 RX ORDER — CEPHALEXIN 500 MG
500 CAPSULE ORAL
Qty: 0 | Refills: 0 | Status: DISCONTINUED | OUTPATIENT
Start: 2017-07-22 | End: 2017-08-02

## 2017-07-22 RX ORDER — NICOTINE POLACRILEX 2 MG
1 GUM BUCCAL DAILY
Qty: 0 | Refills: 0 | Status: DISCONTINUED | OUTPATIENT
Start: 2017-07-22 | End: 2017-08-04

## 2017-07-22 RX ORDER — QUETIAPINE FUMARATE 200 MG/1
400 TABLET, FILM COATED ORAL ONCE
Qty: 0 | Refills: 0 | Status: COMPLETED | OUTPATIENT
Start: 2017-07-22 | End: 2017-07-22

## 2017-07-22 RX ORDER — ALBUTEROL 90 UG/1
2.5 AEROSOL, METERED ORAL EVERY 6 HOURS
Qty: 0 | Refills: 0 | Status: DISCONTINUED | OUTPATIENT
Start: 2017-07-22 | End: 2017-07-22

## 2017-07-22 RX ORDER — QUETIAPINE FUMARATE 200 MG/1
400 TABLET, FILM COATED ORAL AT BEDTIME
Qty: 0 | Refills: 0 | Status: DISCONTINUED | OUTPATIENT
Start: 2017-07-22 | End: 2017-08-04

## 2017-07-22 RX ORDER — PRAZOSIN HCL 2 MG
1 CAPSULE ORAL AT BEDTIME
Qty: 0 | Refills: 0 | Status: DISCONTINUED | OUTPATIENT
Start: 2017-07-22 | End: 2017-08-04

## 2017-07-22 RX ORDER — DOXAZOSIN MESYLATE 4 MG
1 TABLET ORAL AT BEDTIME
Qty: 0 | Refills: 0 | Status: DISCONTINUED | OUTPATIENT
Start: 2017-07-22 | End: 2017-07-22

## 2017-07-22 RX ORDER — ACETAMINOPHEN 500 MG
650 TABLET ORAL EVERY 6 HOURS
Qty: 0 | Refills: 0 | Status: DISCONTINUED | OUTPATIENT
Start: 2017-07-22 | End: 2017-08-04

## 2017-07-22 RX ORDER — VENLAFAXINE HCL 75 MG
300 CAPSULE, EXT RELEASE 24 HR ORAL DAILY
Qty: 0 | Refills: 0 | Status: DISCONTINUED | OUTPATIENT
Start: 2017-07-22 | End: 2017-07-22

## 2017-07-22 RX ORDER — ENOXAPARIN SODIUM 100 MG/ML
40 INJECTION SUBCUTANEOUS EVERY 24 HOURS
Qty: 0 | Refills: 0 | Status: DISCONTINUED | OUTPATIENT
Start: 2017-07-22 | End: 2017-08-04

## 2017-07-22 RX ORDER — IPRATROPIUM/ALBUTEROL SULFATE 18-103MCG
3 AEROSOL WITH ADAPTER (GRAM) INHALATION EVERY 6 HOURS
Qty: 0 | Refills: 0 | Status: DISCONTINUED | OUTPATIENT
Start: 2017-07-22 | End: 2017-08-04

## 2017-07-22 RX ORDER — ESCITALOPRAM OXALATE 10 MG/1
20 TABLET, FILM COATED ORAL AT BEDTIME
Qty: 0 | Refills: 0 | Status: DISCONTINUED | OUTPATIENT
Start: 2017-07-22 | End: 2017-08-04

## 2017-07-22 RX ORDER — TRAZODONE HCL 50 MG
300 TABLET ORAL ONCE
Qty: 0 | Refills: 0 | Status: COMPLETED | OUTPATIENT
Start: 2017-07-22 | End: 2017-07-22

## 2017-07-22 RX ORDER — FERROUS SULFATE 325(65) MG
325 TABLET ORAL
Qty: 0 | Refills: 0 | Status: DISCONTINUED | OUTPATIENT
Start: 2017-07-22 | End: 2017-07-23

## 2017-07-22 RX ORDER — PANTOPRAZOLE SODIUM 20 MG/1
40 TABLET, DELAYED RELEASE ORAL
Qty: 0 | Refills: 0 | Status: DISCONTINUED | OUTPATIENT
Start: 2017-07-22 | End: 2017-08-04

## 2017-07-22 RX ORDER — GABAPENTIN 400 MG/1
800 CAPSULE ORAL THREE TIMES A DAY
Qty: 0 | Refills: 0 | Status: DISCONTINUED | OUTPATIENT
Start: 2017-07-22 | End: 2017-08-04

## 2017-07-22 RX ORDER — VENLAFAXINE HCL 75 MG
150 CAPSULE, EXT RELEASE 24 HR ORAL DAILY
Qty: 0 | Refills: 0 | Status: DISCONTINUED | OUTPATIENT
Start: 2017-07-22 | End: 2017-07-22

## 2017-07-22 RX ORDER — TRAZODONE HCL 50 MG
100 TABLET ORAL AT BEDTIME
Qty: 0 | Refills: 0 | Status: DISCONTINUED | OUTPATIENT
Start: 2017-07-22 | End: 2017-07-22

## 2017-07-22 RX ORDER — GABAPENTIN 400 MG/1
800 CAPSULE ORAL ONCE
Qty: 0 | Refills: 0 | Status: COMPLETED | OUTPATIENT
Start: 2017-07-22 | End: 2017-07-22

## 2017-07-22 RX ORDER — TRAZODONE HCL 50 MG
300 TABLET ORAL AT BEDTIME
Qty: 0 | Refills: 0 | Status: DISCONTINUED | OUTPATIENT
Start: 2017-07-22 | End: 2017-08-04

## 2017-07-22 RX ADMIN — Medication 1 MILLIGRAM(S): at 11:18

## 2017-07-22 RX ADMIN — Medication 100 MILLIGRAM(S): at 13:02

## 2017-07-22 RX ADMIN — QUETIAPINE FUMARATE 400 MILLIGRAM(S): 200 TABLET, FILM COATED ORAL at 23:00

## 2017-07-22 RX ADMIN — ENOXAPARIN SODIUM 40 MILLIGRAM(S): 100 INJECTION SUBCUTANEOUS at 11:18

## 2017-07-22 RX ADMIN — PANTOPRAZOLE SODIUM 40 MILLIGRAM(S): 20 TABLET, DELAYED RELEASE ORAL at 11:18

## 2017-07-22 RX ADMIN — Medication 500 MILLIGRAM(S): at 23:00

## 2017-07-22 RX ADMIN — BUPRENORPHINE AND NALOXONE 4 TABLET(S): 2; .5 TABLET SUBLINGUAL at 15:35

## 2017-07-22 RX ADMIN — Medication 500 MILLIGRAM(S): at 18:45

## 2017-07-22 RX ADMIN — QUETIAPINE FUMARATE 400 MILLIGRAM(S): 200 TABLET, FILM COATED ORAL at 03:44

## 2017-07-22 RX ADMIN — GABAPENTIN 800 MILLIGRAM(S): 400 CAPSULE ORAL at 04:14

## 2017-07-22 RX ADMIN — Medication 1 PATCH: at 13:02

## 2017-07-22 RX ADMIN — Medication 300 MILLIGRAM(S): at 04:14

## 2017-07-22 RX ADMIN — Medication 100 MILLIGRAM(S): at 23:00

## 2017-07-22 RX ADMIN — Medication 500 MILLIGRAM(S): at 23:15

## 2017-07-22 RX ADMIN — ESCITALOPRAM OXALATE 20 MILLIGRAM(S): 10 TABLET, FILM COATED ORAL at 03:43

## 2017-07-22 RX ADMIN — BUPRENORPHINE AND NALOXONE 4 TABLET(S): 2; .5 TABLET SUBLINGUAL at 14:52

## 2017-07-22 NOTE — CONSULT NOTE ADULT - SUBJECTIVE AND OBJECTIVE BOX
Team C Surgery Consult Note ([facility])    42 F presents with lower leg swelling. For the last 2 weeks, patient has had bilateral lower leg swelling, worse on the left. Patient went to [facility] on 7/17 and 7/18. Underwent a Duplex which were negative for DVTs. Swelling was worsening so patient went to [facility]. Patient also complains of intermittent shooting pain from L thigh to L foot.    ROS: 10 point ROS otherwise negative  PMH/PSH: Arthritis, asthma, bipolar disorder, depression  Social: Smokes daily  Family: Not significant    Tmax: 37 (07-22-17 @ 00:34)  HR: 71  BP: 112/57  RR: 18  SpO2: 99%    Gen: NAD  Lungs: Non-labored breathing  Heart: S1, S2  Abdomen: Soft, ND, NTP  Extremities: Bilateral lower leg swelling. Some erythema over L lower leg. Motor and sensation are intact.  Vascular: Pulses difficult to appreciate with swelling. There are b/l femoral, DP and PT signals.    07-21-17    WBC: 6.86  Hgb: 11.7  Hct: 35.6  Plt: 242    Na: 143  K: 3.9  Cl: 105  HCO3: 24  BUN: 11  Cr: 0.76  Glu: 102    Ca: 9.1    Protein: 6.6  Albumin: 3.9  Total bilirubin: 0.2  AST: 25  ALT: 21    Lactate: 1.2    Duplex of lower legs: No DVTs

## 2017-07-22 NOTE — H&P ADULT - NEGATIVE ENMT SYMPTOMS
no ear pain/no sinus symptoms/no nasal discharge/no vertigo/no nasal obstruction/no tinnitus/no nasal congestion/no hearing difficulty

## 2017-07-22 NOTE — H&P ADULT - HISTORY OF PRESENT ILLNESS
42 y.o. woman with history of asthma, anemia, migraine headache, and opioid addiction now with on Suboxone who came to the ER from her inpatient substance abuse rehab center for evaluation of b/l LE edema and pain of 2 weeks in duration. Patient states that she was in her usual state of health when her symptoms started. She reported the pain is worst in the left leg and radiates from the lateral thigh downwards. Pain is aggravated with lying down, ambulation and palpation. No discernable alleviating factors or associated symptoms. No reports of trauma or previous occurrence of the similar symptoms. In ER, patient had negative b/l LE duplex and was given vancomycin and zosyn for presumed cellulitis of the b/l LE extremities. No other complaints at present.

## 2017-07-22 NOTE — H&P ADULT - NEGATIVE MUSCULOSKELETAL SYMPTOMS
no arthralgia/no arthritis/no muscle cramps/no stiffness/no muscle weakness/no myalgia/no neck pain/no joint swelling

## 2017-07-22 NOTE — PROVIDER CONTACT NOTE (MEDICATION) - ASSESSMENT
Suboxone dose. This dose was confirmed with [facility] [phone] with nurse Lashonda Higginbotham, RN who works there. Patient comes daily to get 32/8 mg sublingual dosage. Takes 4 strips of 8/2 mg.

## 2017-07-22 NOTE — CONSULT NOTE ADULT - SUBJECTIVE AND OBJECTIVE BOX
HPI:  42 y.o. woman with history of asthma, anemia, migraine headache, and opioid addiction now with on Suboxone who came to the ER from her inpatient substance abuse rehab center for evaluation of b/l LE edema and pain of 2 weeks in duration. Patient states that she was in her usual state of health when her symptoms started. She reported the pain is worst in the left leg and radiates from the lateral thigh downwards. Pain is aggravated with lying down, ambulation and palpation. No discernable alleviating factors or associated symptoms. No reports of trauma or previous occurrence of the similar symptoms. In ER, patient had negative b/l LE duplex  No other complaints at present      PAST MEDICAL & SURGICAL HISTORY:  Substance abuse  Dysthymia  Bipolar 1 disorder  Depression  Borderline personality disorder  Arthritis  Herniated lumbar disc without myelopathy  Asthma  Fracture of ankle, left, sequela  S/P laparoscopic cholecystectomy      Allergies  Alupent (Unknown)  ascorbic acid (Unknown)        ANTIMICROBIALS:  clindamycin IVPB 600 every 8 hours      OTHER MEDS:  doxazosin 1 milliGRAM(s) Oral at bedtime  folic acid 1 milliGRAM(s) Oral daily  nicotine - 21 mG/24Hr(s) Patch 1 patch Transdermal daily  pantoprazole    Tablet 40 milliGRAM(s) Oral before breakfast  senna 2 Tablet(s) Oral at bedtime  docusate sodium 100 milliGRAM(s) Oral two times a day  ferrous    sulfate 325 milliGRAM(s) Oral three times a day with meals  QUEtiapine 400 milliGRAM(s) Oral at bedtime  traZODone 100 milliGRAM(s) Oral at bedtime PRN  topiramate 100 milliGRAM(s) Oral daily  enoxaparin Injectable 40 milliGRAM(s) SubCutaneous every 24 hours  acetaminophen   Tablet 650 milliGRAM(s) Oral every 6 hours PRN  venlafaxine XR. 300 milliGRAM(s) Oral daily  ALBUTerol/ipratropium for Nebulization 3 milliLiter(s) Nebulizer every 6 hours PRN  buprenorphine 8 mG/naloxone 2 mG SL  Tablet 4 Tablet(s) SubLingual daily      SOCIAL HISTORY: [ ] etoh [ ] tobacco [ ] former smoker [ ] IVDU    FAMILY HISTORY:  No pertinent family history in first degree relatives      REVIEW OF SYSTEMS  [  ] ROS unobtainable because:    [  ] All other systems negative except as noted below:	    Constitutional:  [ ] fever [ ] weight loss  Skin:  [ ] rash [ ] phlebitis	  Eyes: [ ] icterus [ ] inflammation	  ENMT: [ ] discharge [ ] thrush [ ] ulcers [ ] exudates  Respiratory: [ ] dyspnea [ ] hemoptysis [ ] cough [ ] sputum	  Cardiovascular:  [ ] chest pain [ ] palpitations [ ] edema	  Gastrointestinal:  [ ] nausea [ ] vomiting [ ] diarrhea [ ] constipation [ ] pain	  Genitourinary:  [ ] dysuria [ ] frequency [ ] hematuria [ ] discharge [ ] flank pain  Musculoskeletal:  [ ] myalgias [ ] arthralgias [ ] arthritis	  Neurological:  [ ] headache [ ] seizures	  Psychiatric:  [ ] anxiety [ ] depression	  Hematology/Lymphatics:  [ ] lymphadenopathy  Endocrine:  [ ] adrenal [ ] thyroid  Allergic/Immunologic:	 [ ] transplant [ ] seasonal    Vital Signs Last 24 Hrs  T(F): 97.3 (07-22-17 @ 08:50), Max: 98.6 (07-22-17 @ 00:34)    Vital Signs Last 24 Hrs  HR: 76 (07-22-17 @ 08:50) (69 - 76)  BP: 119/75 (07-22-17 @ 08:50) (111/70 - 119/75)  RR: 16 (07-22-17 @ 08:50)  SpO2: 95% (07-22-17 @ 08:50) (95% - 100%)  Wt(kg): --    PHYSICAL EXAM:  General: [ ] non-toxic  HEAD/EYES: [ ] PERRL [ ] white sclera [ ] icterus  ENT:  [ ] normal [ ] supple [ ] thrush [ ] pharyngeal exudate  Cardiovascular:   [ ] murmur [ ] normal [ ] PPM/AICD  Respiratory:  [ ] clear to ausculation bilaterally  GI:  [ ] soft, non-tender, normal bowel sounds  :  [ ] velázquez [ ] no CVA tenderness   Musculoskeletal:  [ ] no synovitis  Neurologic:  [ ] non-focal exam   Skin:  [ ] no rash  Lymph: [ ] no lymphadenopathy  Psychiatric:  [ ] appropriate affect [ ] alert & oriented  Lines:  [ ] no phlebitis [ ] central line                                11.7   6.86  )-----------( 242      ( 21 Jul 2017 17:48 )             35.6       07-21    143  |  105  |  11  ----------------------------<  102<H>  3.9   |  24  |  0.76    Ca    9.1      21 Jul 2017 17:48    TPro  6.6  /  Alb  3.9  /  TBili  0.2  /  DBili  x   /  AST  25  /  ALT  21  /  AlkPhos  77  07-21          MICROBIOLOGY:          v            RADIOLOGY: HPI:  42 y.o. woman with history of asthma, anemia, migraine headache, and opioid addiction now with on Suboxone who came to the ER from her inpatient substance abuse rehab center for evaluation of b/l LE edema and pain of 2 weeks in duration. Patient states that she was in her usual state of health when her symptoms started. She reported the pain is worst in the left leg and radiates from the lateral thigh downwards. Pain is aggravated with lying down, ambulation and palpation. No discernable alleviating factors or associated symptoms. No reports of trauma or previous occurrence of the similar symptoms. In ER, patient had negative b/l LE duplex  No other complaints at present.     She states her legs were very red.    With antibiotics, she feels the redness has improved but still complains of swelling.        PAST MEDICAL & SURGICAL HISTORY:  Substance abuse  Dysthymia  Bipolar 1 disorder  Depression  Borderline personality disorder  Arthritis  Herniated lumbar disc without myelopathy  Asthma  Fracture of ankle, left, sequela  S/P laparoscopic cholecystectomy      Allergies  Alupent (Unknown)  ascorbic acid (Unknown)        ANTIMICROBIALS:  clindamycin IVPB 600 every 8 hours      OTHER MEDS:  doxazosin 1 milliGRAM(s) Oral at bedtime  folic acid 1 milliGRAM(s) Oral daily  nicotine - 21 mG/24Hr(s) Patch 1 patch Transdermal daily  pantoprazole    Tablet 40 milliGRAM(s) Oral before breakfast  senna 2 Tablet(s) Oral at bedtime  docusate sodium 100 milliGRAM(s) Oral two times a day  ferrous    sulfate 325 milliGRAM(s) Oral three times a day with meals  QUEtiapine 400 milliGRAM(s) Oral at bedtime  traZODone 100 milliGRAM(s) Oral at bedtime PRN  topiramate 100 milliGRAM(s) Oral daily  enoxaparin Injectable 40 milliGRAM(s) SubCutaneous every 24 hours  acetaminophen   Tablet 650 milliGRAM(s) Oral every 6 hours PRN  venlafaxine XR. 300 milliGRAM(s) Oral daily  ALBUTerol/ipratropium for Nebulization 3 milliLiter(s) Nebulizer every 6 hours PRN  buprenorphine 8 mG/naloxone 2 mG SL  Tablet 4 Tablet(s) SubLingual daily      SOCIAL HISTORY: [ x] history of substance use with narcoitc pills  single    FAMILY HISTORY:  No pertinent family history in first degree relatives      REVIEW OF SYSTEMS  [  ] ROS unobtainable because:    [x  ] All other systems negative except as noted below:	    Constitutional:  [ ] fever [ ] weight loss  Skin:  [ ] rash [ ] phlebitis	  Eyes: [ ] icterus [ ] inflammation	  ENMT: [ ] discharge [ ] thrush [ ] ulcers [ ] exudates  Respiratory: [ ] dyspnea [ ] hemoptysis [ ] cough [ ] sputum	  Cardiovascular:  [ ] chest pain [ ] palpitations [ ] edema	  Gastrointestinal:  [ ] nausea [ ] vomiting [ ] diarrhea [ ] constipation [ ] pain	  Genitourinary:  [ ] dysuria [ ] frequency [ ] hematuria [ ] discharge [ ] flank pain  Musculoskeletal:  [x ] leg apin  Neurological:  [ ] headache [ ] seizures	  Psychiatric:  [ ] anxiety [ ] depression	  Hematology/Lymphatics:  [ ] lymphadenopathy  Endocrine:  [ ] adrenal [ ] thyroid  Allergic/Immunologic:	 [ ] transplant [ ] seasonal    Vital Signs Last 24 Hrs  T(F): 97.3 (07-22-17 @ 08:50), Max: 98.6 (07-22-17 @ 00:34)    Vital Signs Last 24 Hrs  HR: 76 (07-22-17 @ 08:50) (69 - 76)  BP: 119/75 (07-22-17 @ 08:50) (111/70 - 119/75)  RR: 16 (07-22-17 @ 08:50)  SpO2: 95% (07-22-17 @ 08:50) (95% - 100%)  Wt(kg): --    PHYSICAL EXAM:  General: [ x] non-toxic  HEAD/EYES: [x ] poor dentition  ENT:  [ ] normal [x ] supple [ ] thrush [ ] pharyngeal exudate  Cardiovascular:   [ ] murmur [x ] normal [ ] PPM/AICD  Respiratory:  [ x] clear to ausculation bilaterally  GI:  [x ] soft, non-tender, normal bowel sounds  :  [ ] velázquez [ ] no CVA tenderness   Musculoskeletal:  [x ] no synovitis  Neurologic:  [ x] non-focal exam   Skin:  [ ] bilaterlal legs with edema, slight warmth to both lefs left> right  no rendess on my exam  Lymph: [ ] no lymphadenopathy  Psychiatric:  [ ] appropriate affect [ ] alert & oriented  Lines:  [x ] no phlebitis [ ] central line                                11.7   6.86  )-----------( 242      ( 21 Jul 2017 17:48 )             35.6       07-21    143  |  105  |  11  ----------------------------<  102<H>  3.9   |  24  |  0.76    Ca    9.1      21 Jul 2017 17:48    TPro  6.6  /  Alb  3.9  /  TBili  0.2  /  DBili  x   /  AST  25  /  ALT  21  /  AlkPhos  77  07-21          MICROBIOLOGY:    blood culutre 7/21: testing        v            RADIOLOGY:

## 2017-07-22 NOTE — CONSULT NOTE ADULT - ASSESSMENT
42 year old female presents with bilateral leg pain and describes increased redness to her legs that has resolved with antibiotics.    I do not see cellulitis at this time, but based on her story there is still some concern for a resolving cellulitis.    I do not see purulence or induration that would indicate hgiher risk for MRSA    If this was a cellulitis and it was beffy red with fast resolution- I would be most concerned about strep.    Keflex 500 mg po q 6 for 7 days.    Call with questions

## 2017-07-22 NOTE — H&P ADULT - NEGATIVE NEUROLOGICAL SYMPTOMS
no focal seizures/no tremors/no weakness/no transient paralysis/no paresthesias/no syncope/no generalized seizures

## 2017-07-22 NOTE — CONSULT NOTE ADULT - ASSESSMENT
42 F presents with bilateral lower leg swelling for 2 weeks. No DVTs seen on Duplex.  -No acute Vascular Surgery intervention indicated  -May consider obtaining CT venogram of abdomen/pelvis to look for central obstruction  SUSAN Reis  [phone]

## 2017-07-22 NOTE — H&P ADULT - LYMPHATIC
anterior cervical R/posterior cervical L/anterior cervical L/supraclavicular L/posterior cervical R/supraclavicular R

## 2017-07-22 NOTE — PROGRESS NOTE ADULT - SUBJECTIVE AND OBJECTIVE BOX
PT WAS EVALUATED BY HOSPITALIST EARLIER DURING THE DAY, LABS, VITALS, CONSULTS REVIEWED, DISCUSSED MANAGEMENT PLAN WITH PT - PSYCH/ id/ PODIATRY EVAL

## 2017-07-22 NOTE — H&P ADULT - NEGATIVE OPHTHALMOLOGIC SYMPTOMS
no photophobia/no discharge R/no lacrimation R/no lacrimation L/no blurred vision R/no discharge L/no blurred vision L/no diplopia

## 2017-07-22 NOTE — H&P ADULT - ASSESSMENT
42 y.o. woman with obesity, iron deficiency anemia, migraine headache, asthma, now with b/l lower extremity cellulitis

## 2017-07-22 NOTE — H&P ADULT - RS GEN PE MLT RESP DETAILS PC
good air movement/breath sounds equal/normal/no chest wall tenderness/respirations non-labored/airway patent/clear to auscultation bilaterally

## 2017-07-23 LAB
BASOPHILS # BLD AUTO: 0.03 K/UL — SIGNIFICANT CHANGE UP (ref 0–0.2)
BASOPHILS NFR BLD AUTO: 0.6 % — SIGNIFICANT CHANGE UP (ref 0–2)
BUN SERPL-MCNC: 18 MG/DL — SIGNIFICANT CHANGE UP (ref 7–23)
CALCIUM SERPL-MCNC: 8.5 MG/DL — SIGNIFICANT CHANGE UP (ref 8.4–10.5)
CHLORIDE SERPL-SCNC: 105 MMOL/L — SIGNIFICANT CHANGE UP (ref 98–107)
CO2 SERPL-SCNC: 23 MMOL/L — SIGNIFICANT CHANGE UP (ref 22–31)
CREAT SERPL-MCNC: 0.66 MG/DL — SIGNIFICANT CHANGE UP (ref 0.5–1.3)
EOSINOPHIL # BLD AUTO: 0.22 K/UL — SIGNIFICANT CHANGE UP (ref 0–0.5)
EOSINOPHIL NFR BLD AUTO: 4.1 % — SIGNIFICANT CHANGE UP (ref 0–6)
GLUCOSE SERPL-MCNC: 111 MG/DL — HIGH (ref 70–99)
HCT VFR BLD CALC: 34.3 % — LOW (ref 34.5–45)
HGB BLD-MCNC: 11.2 G/DL — LOW (ref 11.5–15.5)
IMM GRANULOCYTES # BLD AUTO: 0.02 # — SIGNIFICANT CHANGE UP
IMM GRANULOCYTES NFR BLD AUTO: 0.4 % — SIGNIFICANT CHANGE UP (ref 0–1.5)
LYMPHOCYTES # BLD AUTO: 1.72 K/UL — SIGNIFICANT CHANGE UP (ref 1–3.3)
LYMPHOCYTES # BLD AUTO: 31.7 % — SIGNIFICANT CHANGE UP (ref 13–44)
MCHC RBC-ENTMCNC: 28.9 PG — SIGNIFICANT CHANGE UP (ref 27–34)
MCHC RBC-ENTMCNC: 32.7 % — SIGNIFICANT CHANGE UP (ref 32–36)
MCV RBC AUTO: 88.6 FL — SIGNIFICANT CHANGE UP (ref 80–100)
MONOCYTES # BLD AUTO: 0.33 K/UL — SIGNIFICANT CHANGE UP (ref 0–0.9)
MONOCYTES NFR BLD AUTO: 6.1 % — SIGNIFICANT CHANGE UP (ref 2–14)
NEUTROPHILS # BLD AUTO: 3.1 K/UL — SIGNIFICANT CHANGE UP (ref 1.8–7.4)
NEUTROPHILS NFR BLD AUTO: 57.1 % — SIGNIFICANT CHANGE UP (ref 43–77)
NRBC # FLD: 0 — SIGNIFICANT CHANGE UP
PLATELET # BLD AUTO: 211 K/UL — SIGNIFICANT CHANGE UP (ref 150–400)
PMV BLD: 10.1 FL — SIGNIFICANT CHANGE UP (ref 7–13)
POTASSIUM SERPL-MCNC: 3.8 MMOL/L — SIGNIFICANT CHANGE UP (ref 3.5–5.3)
POTASSIUM SERPL-SCNC: 3.8 MMOL/L — SIGNIFICANT CHANGE UP (ref 3.5–5.3)
RBC # BLD: 3.87 M/UL — SIGNIFICANT CHANGE UP (ref 3.8–5.2)
RBC # FLD: 14.6 % — HIGH (ref 10.3–14.5)
SODIUM SERPL-SCNC: 141 MMOL/L — SIGNIFICANT CHANGE UP (ref 135–145)
WBC # BLD: 5.42 K/UL — SIGNIFICANT CHANGE UP (ref 3.8–10.5)
WBC # FLD AUTO: 5.42 K/UL — SIGNIFICANT CHANGE UP (ref 3.8–10.5)

## 2017-07-23 RX ORDER — ESCITALOPRAM OXALATE 10 MG/1
20 TABLET, FILM COATED ORAL ONCE
Qty: 0 | Refills: 0 | Status: COMPLETED | OUTPATIENT
Start: 2017-07-23 | End: 2017-07-23

## 2017-07-23 RX ORDER — TRAZODONE HCL 50 MG
300 TABLET ORAL ONCE
Qty: 0 | Refills: 0 | Status: COMPLETED | OUTPATIENT
Start: 2017-07-23 | End: 2017-07-23

## 2017-07-23 RX ORDER — PRAZOSIN HCL 2 MG
1 CAPSULE ORAL ONCE
Qty: 0 | Refills: 0 | Status: COMPLETED | OUTPATIENT
Start: 2017-07-23 | End: 2017-07-23

## 2017-07-23 RX ORDER — FERROUS SULFATE 325(65) MG
300 TABLET ORAL
Qty: 0 | Refills: 0 | Status: DISCONTINUED | OUTPATIENT
Start: 2017-07-23 | End: 2017-08-04

## 2017-07-23 RX ORDER — GABAPENTIN 400 MG/1
800 CAPSULE ORAL ONCE
Qty: 0 | Refills: 0 | Status: COMPLETED | OUTPATIENT
Start: 2017-07-23 | End: 2017-07-23

## 2017-07-23 RX ADMIN — QUETIAPINE FUMARATE 400 MILLIGRAM(S): 200 TABLET, FILM COATED ORAL at 21:19

## 2017-07-23 RX ADMIN — Medication 300 MILLIGRAM(S): at 00:50

## 2017-07-23 RX ADMIN — Medication 500 MILLIGRAM(S): at 12:42

## 2017-07-23 RX ADMIN — Medication 650 MILLIGRAM(S): at 12:43

## 2017-07-23 RX ADMIN — GABAPENTIN 800 MILLIGRAM(S): 400 CAPSULE ORAL at 13:14

## 2017-07-23 RX ADMIN — ESCITALOPRAM OXALATE 20 MILLIGRAM(S): 10 TABLET, FILM COATED ORAL at 21:19

## 2017-07-23 RX ADMIN — Medication 100 MILLIGRAM(S): at 05:40

## 2017-07-23 RX ADMIN — Medication 300 MILLIGRAM(S): at 13:08

## 2017-07-23 RX ADMIN — GABAPENTIN 800 MILLIGRAM(S): 400 CAPSULE ORAL at 21:19

## 2017-07-23 RX ADMIN — Medication 500 MILLIGRAM(S): at 06:00

## 2017-07-23 RX ADMIN — Medication 2000 UNIT(S): at 12:42

## 2017-07-23 RX ADMIN — Medication 1 PATCH: at 12:43

## 2017-07-23 RX ADMIN — Medication 20 MILLIGRAM(S): at 05:41

## 2017-07-23 RX ADMIN — Medication 500 MILLIGRAM(S): at 17:46

## 2017-07-23 RX ADMIN — ESCITALOPRAM OXALATE 20 MILLIGRAM(S): 10 TABLET, FILM COATED ORAL at 00:50

## 2017-07-23 RX ADMIN — GABAPENTIN 800 MILLIGRAM(S): 400 CAPSULE ORAL at 05:41

## 2017-07-23 RX ADMIN — Medication 500 MILLIGRAM(S): at 18:30

## 2017-07-23 RX ADMIN — Medication 1 TABLET(S): at 12:43

## 2017-07-23 RX ADMIN — Medication 100 MILLIGRAM(S): at 12:43

## 2017-07-23 RX ADMIN — Medication 100 MILLIGRAM(S): at 05:41

## 2017-07-23 RX ADMIN — Medication 1 MILLIGRAM(S): at 21:19

## 2017-07-23 RX ADMIN — Medication 500 MILLIGRAM(S): at 17:47

## 2017-07-23 RX ADMIN — BUPRENORPHINE AND NALOXONE 4 TABLET(S): 2; .5 TABLET SUBLINGUAL at 14:00

## 2017-07-23 RX ADMIN — Medication 500 MILLIGRAM(S): at 05:40

## 2017-07-23 RX ADMIN — BUPRENORPHINE AND NALOXONE 4 TABLET(S): 2; .5 TABLET SUBLINGUAL at 13:07

## 2017-07-23 RX ADMIN — Medication 300 MILLIGRAM(S): at 21:19

## 2017-07-23 RX ADMIN — PANTOPRAZOLE SODIUM 40 MILLIGRAM(S): 20 TABLET, DELAYED RELEASE ORAL at 05:41

## 2017-07-23 RX ADMIN — Medication 1 PATCH: at 13:02

## 2017-07-23 RX ADMIN — ENOXAPARIN SODIUM 40 MILLIGRAM(S): 100 INJECTION SUBCUTANEOUS at 12:43

## 2017-07-23 RX ADMIN — GABAPENTIN 800 MILLIGRAM(S): 400 CAPSULE ORAL at 00:50

## 2017-07-23 RX ADMIN — Medication 1 MILLIGRAM(S): at 00:50

## 2017-07-23 RX ADMIN — Medication 500 MILLIGRAM(S): at 00:00

## 2017-07-23 RX ADMIN — Medication 500 MILLIGRAM(S): at 05:41

## 2017-07-23 RX ADMIN — Medication 100 MILLIGRAM(S): at 21:19

## 2017-07-23 RX ADMIN — Medication 300 MILLIGRAM(S): at 17:47

## 2017-07-23 RX ADMIN — Medication 100 MILLIGRAM(S): at 13:07

## 2017-07-23 RX ADMIN — Medication 500 MILLIGRAM(S): at 23:06

## 2017-07-23 NOTE — PROGRESS NOTE ADULT - SUBJECTIVE AND OBJECTIVE BOX
chief complaint : lower ext edema/ erythema of toe    SUBJECTIVE / OVERNIGHT EVENTS: pt asking for her sub axone medication dose/ denies chest pain, shortness of breath, nausea, v      MEDICATIONS  (STANDING):  nicotine - 21 mG/24Hr(s) Patch 1 patch Transdermal daily  pantoprazole    Tablet 40 milliGRAM(s) Oral before breakfast  senna 2 Tablet(s) Oral at bedtime  docusate sodium 100 milliGRAM(s) Oral two times a day  QUEtiapine 400 milliGRAM(s) Oral at bedtime  topiramate 100 milliGRAM(s) Oral daily  enoxaparin Injectable 40 milliGRAM(s) SubCutaneous every 24 hours  clindamycin IVPB 600 milliGRAM(s) IV Intermittent every 8 hours  buprenorphine 8 mG/naloxone 2 mG SL  Tablet 4 Tablet(s) SubLingual daily  cephalexin 500 milliGRAM(s) Oral four times a day  prazosin 1 milliGRAM(s) Oral at bedtime  naproxen 500 milliGRAM(s) Oral two times a day  gabapentin 800 milliGRAM(s) Oral three times a day  escitalopram 20 milliGRAM(s) Oral at bedtime  traZODone 300 milliGRAM(s) Oral at bedtime  furosemide    Tablet 20 milliGRAM(s) Oral daily  multivitamin 1 Tablet(s) Oral daily  cholecalciferol 2000 Unit(s) Oral daily  ferrous    sulfate Liquid 300 milliGRAM(s) Oral three times a day with meals    MEDICATIONS  (PRN):  acetaminophen   Tablet 650 milliGRAM(s) Oral every 6 hours PRN For Temp greater than 38 C (100.4 F)  ALBUTerol/ipratropium for Nebulization 3 milliLiter(s) Nebulizer every 6 hours PRN Shortness of Breath and/or Wheezing        CAPILLARY BLOOD GLUCOSE        I&O's Summary      Constitutional: No fever, fatigue  Skin: No rash.  Eyes: No recent vision problems or eye pain.  ENT: No congestion, ear pain, or sore throat.  Cardiovascular: No chest pain or palpation.  Respiratory: No cough, shortness of breath, congestion, or wheezing.  Gastrointestinal: No abdominal pain, nausea, vomiting, or diarrhea.  Genitourinary: No dysuria.  Musculoskeletal: No joint swelling.  Neurologic: No headache.    PHYSICAL EXAM:  GENERAL: NAD  EYES: EOMI, PERRLA  NECK: Supple, No JVD  CHEST/LUNG: dec breath sounds at bases  HEART:  S1 , S2 +  ABDOMEN: soft, bs+  EXTREMITIES: trace edema+   NEUROLOGY:alert awake orinted      LABS:                        11.2   5.42  )-----------( 211      ( 23 Jul 2017 06:05 )             34.3     07-23    141  |  105  |  18  ----------------------------<  111<H>  3.8   |  23  |  0.66    Ca    8.5      23 Jul 2017 06:05                RADIOLOGY & ADDITIONAL TESTS:    Imaging Personally Reviewed:    Consultant(s) Notes Reviewed:      Care Discussed with Consultants/Other Providers:

## 2017-07-24 LAB
BASOPHILS # BLD AUTO: 0.03 K/UL — SIGNIFICANT CHANGE UP (ref 0–0.2)
BASOPHILS NFR BLD AUTO: 0.6 % — SIGNIFICANT CHANGE UP (ref 0–2)
BUN SERPL-MCNC: 16 MG/DL — SIGNIFICANT CHANGE UP (ref 7–23)
CALCIUM SERPL-MCNC: 8.6 MG/DL — SIGNIFICANT CHANGE UP (ref 8.4–10.5)
CHLORIDE SERPL-SCNC: 106 MMOL/L — SIGNIFICANT CHANGE UP (ref 98–107)
CO2 SERPL-SCNC: 26 MMOL/L — SIGNIFICANT CHANGE UP (ref 22–31)
CREAT SERPL-MCNC: 0.63 MG/DL — SIGNIFICANT CHANGE UP (ref 0.5–1.3)
EOSINOPHIL # BLD AUTO: 0.25 K/UL — SIGNIFICANT CHANGE UP (ref 0–0.5)
EOSINOPHIL NFR BLD AUTO: 5.2 % — SIGNIFICANT CHANGE UP (ref 0–6)
GLUCOSE SERPL-MCNC: 93 MG/DL — SIGNIFICANT CHANGE UP (ref 70–99)
HCT VFR BLD CALC: 33.9 % — LOW (ref 34.5–45)
HGB BLD-MCNC: 11.1 G/DL — LOW (ref 11.5–15.5)
IMM GRANULOCYTES # BLD AUTO: 0.02 # — SIGNIFICANT CHANGE UP
IMM GRANULOCYTES NFR BLD AUTO: 0.4 % — SIGNIFICANT CHANGE UP (ref 0–1.5)
LYMPHOCYTES # BLD AUTO: 1.7 K/UL — SIGNIFICANT CHANGE UP (ref 1–3.3)
LYMPHOCYTES # BLD AUTO: 35.3 % — SIGNIFICANT CHANGE UP (ref 13–44)
MAGNESIUM SERPL-MCNC: 1.9 MG/DL — SIGNIFICANT CHANGE UP (ref 1.6–2.6)
MCHC RBC-ENTMCNC: 28.8 PG — SIGNIFICANT CHANGE UP (ref 27–34)
MCHC RBC-ENTMCNC: 32.7 % — SIGNIFICANT CHANGE UP (ref 32–36)
MCV RBC AUTO: 88.1 FL — SIGNIFICANT CHANGE UP (ref 80–100)
MONOCYTES # BLD AUTO: 0.33 K/UL — SIGNIFICANT CHANGE UP (ref 0–0.9)
MONOCYTES NFR BLD AUTO: 6.8 % — SIGNIFICANT CHANGE UP (ref 2–14)
NEUTROPHILS # BLD AUTO: 2.49 K/UL — SIGNIFICANT CHANGE UP (ref 1.8–7.4)
NEUTROPHILS NFR BLD AUTO: 51.7 % — SIGNIFICANT CHANGE UP (ref 43–77)
NRBC # FLD: 0 — SIGNIFICANT CHANGE UP
PLATELET # BLD AUTO: 210 K/UL — SIGNIFICANT CHANGE UP (ref 150–400)
PMV BLD: 10.1 FL — SIGNIFICANT CHANGE UP (ref 7–13)
POTASSIUM SERPL-MCNC: 3.9 MMOL/L — SIGNIFICANT CHANGE UP (ref 3.5–5.3)
POTASSIUM SERPL-SCNC: 3.9 MMOL/L — SIGNIFICANT CHANGE UP (ref 3.5–5.3)
RBC # BLD: 3.85 M/UL — SIGNIFICANT CHANGE UP (ref 3.8–5.2)
RBC # FLD: 14.3 % — SIGNIFICANT CHANGE UP (ref 10.3–14.5)
SODIUM SERPL-SCNC: 143 MMOL/L — SIGNIFICANT CHANGE UP (ref 135–145)
WBC # BLD: 4.82 K/UL — SIGNIFICANT CHANGE UP (ref 3.8–10.5)
WBC # FLD AUTO: 4.82 K/UL — SIGNIFICANT CHANGE UP (ref 3.8–10.5)

## 2017-07-24 PROCEDURE — 99232 SBSQ HOSP IP/OBS MODERATE 35: CPT | Mod: GC

## 2017-07-24 RX ADMIN — Medication 1 PATCH: at 11:59

## 2017-07-24 RX ADMIN — Medication 100 MILLIGRAM(S): at 12:02

## 2017-07-24 RX ADMIN — BUPRENORPHINE AND NALOXONE 4 TABLET(S): 2; .5 TABLET SUBLINGUAL at 13:45

## 2017-07-24 RX ADMIN — Medication 500 MILLIGRAM(S): at 12:02

## 2017-07-24 RX ADMIN — GABAPENTIN 800 MILLIGRAM(S): 400 CAPSULE ORAL at 21:29

## 2017-07-24 RX ADMIN — ENOXAPARIN SODIUM 40 MILLIGRAM(S): 100 INJECTION SUBCUTANEOUS at 12:02

## 2017-07-24 RX ADMIN — Medication 300 MILLIGRAM(S): at 13:39

## 2017-07-24 RX ADMIN — QUETIAPINE FUMARATE 400 MILLIGRAM(S): 200 TABLET, FILM COATED ORAL at 21:29

## 2017-07-24 RX ADMIN — Medication 500 MILLIGRAM(S): at 23:04

## 2017-07-24 RX ADMIN — PANTOPRAZOLE SODIUM 40 MILLIGRAM(S): 20 TABLET, DELAYED RELEASE ORAL at 06:10

## 2017-07-24 RX ADMIN — Medication 300 MILLIGRAM(S): at 12:00

## 2017-07-24 RX ADMIN — Medication 1 TABLET(S): at 12:02

## 2017-07-24 RX ADMIN — Medication 500 MILLIGRAM(S): at 18:00

## 2017-07-24 RX ADMIN — Medication 500 MILLIGRAM(S): at 17:10

## 2017-07-24 RX ADMIN — Medication 500 MILLIGRAM(S): at 06:53

## 2017-07-24 RX ADMIN — Medication 20 MILLIGRAM(S): at 06:10

## 2017-07-24 RX ADMIN — GABAPENTIN 800 MILLIGRAM(S): 400 CAPSULE ORAL at 06:10

## 2017-07-24 RX ADMIN — Medication 1 PATCH: at 12:01

## 2017-07-24 RX ADMIN — GABAPENTIN 800 MILLIGRAM(S): 400 CAPSULE ORAL at 13:01

## 2017-07-24 RX ADMIN — Medication 500 MILLIGRAM(S): at 06:10

## 2017-07-24 RX ADMIN — Medication 1 MILLIGRAM(S): at 21:29

## 2017-07-24 RX ADMIN — BUPRENORPHINE AND NALOXONE 4 TABLET(S): 2; .5 TABLET SUBLINGUAL at 13:02

## 2017-07-24 RX ADMIN — Medication 300 MILLIGRAM(S): at 17:10

## 2017-07-24 RX ADMIN — Medication 2000 UNIT(S): at 12:01

## 2017-07-24 RX ADMIN — Medication 300 MILLIGRAM(S): at 21:29

## 2017-07-24 RX ADMIN — ESCITALOPRAM OXALATE 20 MILLIGRAM(S): 10 TABLET, FILM COATED ORAL at 21:29

## 2017-07-24 RX ADMIN — Medication 100 MILLIGRAM(S): at 06:10

## 2017-07-24 NOTE — PROGRESS NOTE ADULT - SUBJECTIVE AND OBJECTIVE BOX
chief complaint : lower ext edema/ erythema of toe    SUBJECTIVE / OVERNIGHT EVENTS:  denies chest pain, shortness of breath, nausea, v    MEDICATIONS  (STANDING):  nicotine - 21 mG/24Hr(s) Patch 1 patch Transdermal daily  pantoprazole    Tablet 40 milliGRAM(s) Oral before breakfast  senna 2 Tablet(s) Oral at bedtime  docusate sodium 100 milliGRAM(s) Oral two times a day  QUEtiapine 400 milliGRAM(s) Oral at bedtime  topiramate 100 milliGRAM(s) Oral daily  enoxaparin Injectable 40 milliGRAM(s) SubCutaneous every 24 hours  buprenorphine 8 mG/naloxone 2 mG SL  Tablet 4 Tablet(s) SubLingual daily  cephalexin 500 milliGRAM(s) Oral four times a day  prazosin 1 milliGRAM(s) Oral at bedtime  naproxen 500 milliGRAM(s) Oral two times a day  gabapentin 800 milliGRAM(s) Oral three times a day  escitalopram 20 milliGRAM(s) Oral at bedtime  traZODone 300 milliGRAM(s) Oral at bedtime  furosemide    Tablet 20 milliGRAM(s) Oral daily  multivitamin 1 Tablet(s) Oral daily  cholecalciferol 2000 Unit(s) Oral daily  ferrous    sulfate Liquid 300 milliGRAM(s) Oral three times a day with meals    MEDICATIONS  (PRN):  acetaminophen   Tablet 650 milliGRAM(s) Oral every 6 hours PRN For Temp greater than 38 C (100.4 F)  ALBUTerol/ipratropium for Nebulization 3 milliLiter(s) Nebulizer every 6 hours PRN Shortness of Breath and/or Wheezing      Vital Signs Last 24 Hrs  T(C): 36.4 (24 Jul 2017 21:15), Max: 36.6 (24 Jul 2017 06:06)  T(F): 97.6 (24 Jul 2017 21:15), Max: 97.8 (24 Jul 2017 06:06)  HR: 62 (24 Jul 2017 21:15) (60 - 67)  BP: 109/67 (24 Jul 2017 21:15) (106/58 - 113/67)  BP(mean): --  RR: 18 (24 Jul 2017 21:15) (16 - 18)  SpO2: 96% (24 Jul 2017 21:15) (96% - 99%)        Constitutional: No fever, fatigue  Skin: No rash.  Eyes: No recent vision problems or eye pain.  ENT: No congestion, ear pain, or sore throat.  Cardiovascular: No chest pain or palpation.  Respiratory: No cough, shortness of breath, congestion, or wheezing.  Gastrointestinal: No abdominal pain, nausea, vomiting, or diarrhea.  Genitourinary: No dysuria.  Musculoskeletal: No joint swelling.  Neurologic: No headache.    PHYSICAL EXAM:  GENERAL: NAD  EYES: EOMI, PERRLA  NECK: Supple, No JVD  CHEST/LUNG: dec breath sounds at bases  HEART:  S1 , S2 +  ABDOMEN: soft, bs+  EXTREMITIES: trace edema+   NEUROLOGY:alert awake oriented    LABS:  07-24    143  |  106  |  16  ----------------------------<  93  3.9   |  26  |  0.63    Ca    8.6      24 Jul 2017 07:49  Mg     1.9     07-24      Creatinine Trend: 0.63 <--, 0.66 <--, 0.76 <--                        11.1   4.82  )-----------( 210      ( 24 Jul 2017 07:49 )             33.9     Urine Studies:                            RADIOLOGY & ADDITIONAL TESTS:    Imaging Personally Reviewed:    Consultant(s) Notes Reviewed:      Care Discussed with Consultants/Other Providers:

## 2017-07-24 NOTE — PROGRESS NOTE ADULT - ASSESSMENT
Assessment/Plan: 42y Female presents with 2 weeks of bilateral lower extremity swelling associated with pain. Currently being treated for cellulitis.     - C/w antibiotics since there is clinical improvement  - No arterial compromise since there are distal pulses. Awaiting CT venogram.  - Will reassess pending CT results    Pager [phone]  -

## 2017-07-24 NOTE — PROGRESS NOTE ADULT - SUBJECTIVE AND OBJECTIVE BOX
VASCULAR SURGERY  Pager: [phone]    INTERVAL EVENTS/SUBJECTIVE:   Reports that left leg feels tight, but has improved since she was admitted. Has burning pain along lateral thighs bilaterally.   ______________________________________________  OBJECTIVE:   T(C): 36.6 (07-24-17 @ 06:06), Max: 37.2 (07-23-17 @ 21:16)  HR: 60 (07-24-17 @ 06:06) (60 - 78)  BP: 113/67 (07-24-17 @ 06:06) (102/56 - 113/67)  RR: 18 (07-24-17 @ 06:06) (18 - 18)  SpO2: 98% (07-24-17 @ 06:06) (98% - 98%)    Physical exam:  General: NAD  Bilateral LE warm with swelling. Distal pulses.   Limited ankle movement in L foot due to pain  L leg with very mild erythema

## 2017-07-25 LAB
BASOPHILS # BLD AUTO: 0.03 K/UL — SIGNIFICANT CHANGE UP (ref 0–0.2)
BASOPHILS NFR BLD AUTO: 0.6 % — SIGNIFICANT CHANGE UP (ref 0–2)
BUN SERPL-MCNC: 14 MG/DL — SIGNIFICANT CHANGE UP (ref 7–23)
CALCIUM SERPL-MCNC: 8.9 MG/DL — SIGNIFICANT CHANGE UP (ref 8.4–10.5)
CHLORIDE SERPL-SCNC: 105 MMOL/L — SIGNIFICANT CHANGE UP (ref 98–107)
CO2 SERPL-SCNC: 27 MMOL/L — SIGNIFICANT CHANGE UP (ref 22–31)
CREAT SERPL-MCNC: 0.63 MG/DL — SIGNIFICANT CHANGE UP (ref 0.5–1.3)
EOSINOPHIL # BLD AUTO: 0.24 K/UL — SIGNIFICANT CHANGE UP (ref 0–0.5)
EOSINOPHIL NFR BLD AUTO: 5.1 % — SIGNIFICANT CHANGE UP (ref 0–6)
GLUCOSE SERPL-MCNC: 86 MG/DL — SIGNIFICANT CHANGE UP (ref 70–99)
HCT VFR BLD CALC: 34.4 % — LOW (ref 34.5–45)
HGB BLD-MCNC: 11.5 G/DL — SIGNIFICANT CHANGE UP (ref 11.5–15.5)
IMM GRANULOCYTES # BLD AUTO: 0.02 # — SIGNIFICANT CHANGE UP
IMM GRANULOCYTES NFR BLD AUTO: 0.4 % — SIGNIFICANT CHANGE UP (ref 0–1.5)
LYMPHOCYTES # BLD AUTO: 1.62 K/UL — SIGNIFICANT CHANGE UP (ref 1–3.3)
LYMPHOCYTES # BLD AUTO: 34.6 % — SIGNIFICANT CHANGE UP (ref 13–44)
MAGNESIUM SERPL-MCNC: 1.9 MG/DL — SIGNIFICANT CHANGE UP (ref 1.6–2.6)
MCHC RBC-ENTMCNC: 29.2 PG — SIGNIFICANT CHANGE UP (ref 27–34)
MCHC RBC-ENTMCNC: 33.4 % — SIGNIFICANT CHANGE UP (ref 32–36)
MCV RBC AUTO: 87.3 FL — SIGNIFICANT CHANGE UP (ref 80–100)
MONOCYTES # BLD AUTO: 0.32 K/UL — SIGNIFICANT CHANGE UP (ref 0–0.9)
MONOCYTES NFR BLD AUTO: 6.8 % — SIGNIFICANT CHANGE UP (ref 2–14)
NEUTROPHILS # BLD AUTO: 2.45 K/UL — SIGNIFICANT CHANGE UP (ref 1.8–7.4)
NEUTROPHILS NFR BLD AUTO: 52.5 % — SIGNIFICANT CHANGE UP (ref 43–77)
NRBC # FLD: 0 — SIGNIFICANT CHANGE UP
PHOSPHATE SERPL-MCNC: 3.3 MG/DL — SIGNIFICANT CHANGE UP (ref 2.5–4.5)
PLATELET # BLD AUTO: 205 K/UL — SIGNIFICANT CHANGE UP (ref 150–400)
PMV BLD: 9.9 FL — SIGNIFICANT CHANGE UP (ref 7–13)
POTASSIUM SERPL-MCNC: 4.1 MMOL/L — SIGNIFICANT CHANGE UP (ref 3.5–5.3)
POTASSIUM SERPL-SCNC: 4.1 MMOL/L — SIGNIFICANT CHANGE UP (ref 3.5–5.3)
RBC # BLD: 3.94 M/UL — SIGNIFICANT CHANGE UP (ref 3.8–5.2)
RBC # FLD: 14 % — SIGNIFICANT CHANGE UP (ref 10.3–14.5)
SODIUM SERPL-SCNC: 143 MMOL/L — SIGNIFICANT CHANGE UP (ref 135–145)
WBC # BLD: 4.68 K/UL — SIGNIFICANT CHANGE UP (ref 3.8–10.5)
WBC # FLD AUTO: 4.68 K/UL — SIGNIFICANT CHANGE UP (ref 3.8–10.5)

## 2017-07-25 PROCEDURE — 93306 TTE W/DOPPLER COMPLETE: CPT | Mod: 26

## 2017-07-25 PROCEDURE — 74177 CT ABD & PELVIS W/CONTRAST: CPT | Mod: 26

## 2017-07-25 RX ADMIN — QUETIAPINE FUMARATE 400 MILLIGRAM(S): 200 TABLET, FILM COATED ORAL at 21:35

## 2017-07-25 RX ADMIN — Medication 300 MILLIGRAM(S): at 17:36

## 2017-07-25 RX ADMIN — Medication 500 MILLIGRAM(S): at 06:35

## 2017-07-25 RX ADMIN — Medication 500 MILLIGRAM(S): at 17:58

## 2017-07-25 RX ADMIN — Medication 1 PATCH: at 12:29

## 2017-07-25 RX ADMIN — BUPRENORPHINE AND NALOXONE 4 TABLET(S): 2; .5 TABLET SUBLINGUAL at 13:09

## 2017-07-25 RX ADMIN — Medication 1 TABLET(S): at 12:35

## 2017-07-25 RX ADMIN — Medication 500 MILLIGRAM(S): at 07:29

## 2017-07-25 RX ADMIN — Medication 500 MILLIGRAM(S): at 12:35

## 2017-07-25 RX ADMIN — Medication 100 MILLIGRAM(S): at 12:35

## 2017-07-25 RX ADMIN — Medication 300 MILLIGRAM(S): at 21:34

## 2017-07-25 RX ADMIN — Medication 500 MILLIGRAM(S): at 23:20

## 2017-07-25 RX ADMIN — Medication 20 MILLIGRAM(S): at 06:35

## 2017-07-25 RX ADMIN — Medication 300 MILLIGRAM(S): at 09:35

## 2017-07-25 RX ADMIN — ENOXAPARIN SODIUM 40 MILLIGRAM(S): 100 INJECTION SUBCUTANEOUS at 12:34

## 2017-07-25 RX ADMIN — GABAPENTIN 800 MILLIGRAM(S): 400 CAPSULE ORAL at 21:35

## 2017-07-25 RX ADMIN — Medication 2000 UNIT(S): at 12:35

## 2017-07-25 RX ADMIN — BUPRENORPHINE AND NALOXONE 4 TABLET(S): 2; .5 TABLET SUBLINGUAL at 12:35

## 2017-07-25 RX ADMIN — Medication 300 MILLIGRAM(S): at 12:34

## 2017-07-25 RX ADMIN — Medication 500 MILLIGRAM(S): at 17:36

## 2017-07-25 RX ADMIN — Medication 1 MILLIGRAM(S): at 21:35

## 2017-07-25 RX ADMIN — Medication 1 PATCH: at 12:34

## 2017-07-25 RX ADMIN — PANTOPRAZOLE SODIUM 40 MILLIGRAM(S): 20 TABLET, DELAYED RELEASE ORAL at 06:35

## 2017-07-25 RX ADMIN — ESCITALOPRAM OXALATE 20 MILLIGRAM(S): 10 TABLET, FILM COATED ORAL at 21:35

## 2017-07-25 RX ADMIN — GABAPENTIN 800 MILLIGRAM(S): 400 CAPSULE ORAL at 06:35

## 2017-07-25 RX ADMIN — GABAPENTIN 800 MILLIGRAM(S): 400 CAPSULE ORAL at 16:12

## 2017-07-25 NOTE — PROGRESS NOTE ADULT - ASSESSMENT
Assessment/Plan: 42y Female presents with 2 weeks of bilateral lower extremity swelling associated with pain. Currently being treated for cellulitis.     - Awaiting CT venogram.  - Will reassess after CT results    Pager [phone]

## 2017-07-25 NOTE — CHART NOTE - NSCHARTNOTEFT_GEN_A_CORE
Called by radiology for findings of CT A/P with IV contrast. 8cm left adnexal mass found. Spoke with patient and no history of mass or cyst is known. She does report having menorrhagia, bleeding up to 3wks per month requiring frequent pad change, sometimes up to once every hour. She has not seen a GYN since  and is a . Per radiology can have MRI abdomen with contrast to further evaluate. D/W Ahumada and the patient and test ordered.

## 2017-07-25 NOTE — CONSULT NOTE ADULT - ASSESSMENT
EKG - not in chart   Echo - Normal LV function     1) B/L lower extremity edema - CT abd shows possible left ovarian mass, echo shows Normal LV and RVfunction     2) Cellulitis - on cephalexin    3) DVT prophylasix - on SC lovenox

## 2017-07-25 NOTE — PROGRESS NOTE ADULT - SUBJECTIVE AND OBJECTIVE BOX
VASCULAR SURGERY  Pager: [phone]    INTERVAL EVENTS/SUBJECTIVE:   Symptoms improving on antibiotics.   ______________________________________________  OBJECTIVE:   Vital Signs Last 24 Hrs  T(C): 36.6 (25 Jul 2017 06:34), Max: 36.6 (25 Jul 2017 06:34)  T(F): 97.8 (25 Jul 2017 06:34), Max: 97.8 (25 Jul 2017 06:34)  HR: 62 (25 Jul 2017 06:34) (62 - 67)  BP: 116/63 (25 Jul 2017 06:34) (106/58 - 116/63)  BP(mean): --  RR: 18 (25 Jul 2017 06:34) (16 - 18)  SpO2: 95% (25 Jul 2017 06:34) (95% - 99%)    Physical exam:  General: NAD  Bilateral LE warm with swelling. Distal pulses.   Limited ankle movement in L foot due to pain  L leg with very mild erythema

## 2017-07-25 NOTE — CONSULT NOTE ADULT - SUBJECTIVE AND OBJECTIVE BOX
CHIEF COMPLAINT:     42 y.o. woman with history of asthma, anemia, migraine headache, and opioid addiction now with on Suboxone who came to the ER from her inpatient substance abuse rehab center for evaluation of b/l LE edema and pain of 2 weeks in duration. Patient states that she was in her usual state of health when her symptoms started. She reported the pain is worst in the left leg and radiates from the lateral thigh downwards. Pain is aggravated with lying down, ambulation and palpation. No discernable alleviating factors or associated symptoms. No reports of trauma or previous occurrence of the similar symptoms. In ER, patient had negative b/l LE duplex and was given vancomycin and zosyn for presumed cellulitis of the b/l LE extremities. No other complaints at present.  Pt denies any orthopnea, PND, no chest pain or SOB on exertion    HISTORY OF PRESENT ILLNESS:    PAST MEDICAL & SURGICAL HISTORY:  Substance abuse  Dysthymia  Bipolar 1 disorder  Depression  Borderline personality disorder  Arthritis  Herniated lumbar disc without myelopathy  Asthma  Fracture of ankle, left, sequela  S/P laparoscopic cholecystectomy    MEDICATIONS:  enoxaparin Injectable 40 milliGRAM(s) SubCutaneous every 24 hours  prazosin 1 milliGRAM(s) Oral at bedtime  furosemide    Tablet 20 milliGRAM(s) Oral daily  cephalexin 500 milliGRAM(s) Oral four times a day  ALBUTerol/ipratropium for Nebulization 3 milliLiter(s) Nebulizer every 6 hours PRN    QUEtiapine 400 milliGRAM(s) Oral at bedtime  topiramate 100 milliGRAM(s) Oral daily  acetaminophen   Tablet 650 milliGRAM(s) Oral every 6 hours PRN  buprenorphine 8 mG/naloxone 2 mG SL  Tablet 4 Tablet(s) SubLingual daily  naproxen 500 milliGRAM(s) Oral two times a day  gabapentin 800 milliGRAM(s) Oral three times a day  escitalopram 20 milliGRAM(s) Oral at bedtime  traZODone 300 milliGRAM(s) Oral at bedtime    pantoprazole    Tablet 40 milliGRAM(s) Oral before breakfast  senna 2 Tablet(s) Oral at bedtime  docusate sodium 100 milliGRAM(s) Oral two times a day      multivitamin 1 Tablet(s) Oral daily  cholecalciferol 2000 Unit(s) Oral daily  ferrous    sulfate Liquid 300 milliGRAM(s) Oral three times a day with meals      FAMILY HISTORY:  No pertinent family history in first degree relatives      SOCIAL HISTORY:    [ ] Non-smoker  [ ] Smoker  [ ] Alcohol    Allergies    Alupent (Unknown)  ascorbic acid (Unknown)    Intolerances    PHYSICAL EXAM:  T(C): 37.3 (07-25-17 @ 14:50), Max: 37.3 (07-25-17 @ 14:50)  HR: 77 (07-25-17 @ 14:50) (62 - 77)  BP: 115/63 (07-25-17 @ 14:50) (109/67 - 116/63)  RR: 18 (07-25-17 @ 14:50) (18 - 18)  SpO2: 95% (07-25-17 @ 14:50) (95% - 96%)  Wt(kg): --  I&O's Summary      Appearance: Normal	  HEENT:   Normal oral mucosa, PERRL, EOMI	no JVD  Cardiovascular: Normal S1 S2, No JVD, No murmurs, No edema  Respiratory: Lungs clear to auscultation	  Gastrointestinal:  Soft, Non-tender, + BS	  Extremities: 2+ edema                          11.5   4.68  )-----------( 205      ( 25 Jul 2017 07:22 )             34.4     07-25    143  |  105  |  14  ----------------------------<  86  4.1   |  27  |  0.63    Ca    8.9      25 Jul 2017 07:22  Phos  3.3     07-25  Mg     1.9     07-25      proBNP:   Lipid Profile:   HgA1c:   TSH:     ASSESSMENT/PLAN:

## 2017-07-26 DIAGNOSIS — N94.9 UNSPECIFIED CONDITION ASSOCIATED WITH FEMALE GENITAL ORGANS AND MENSTRUAL CYCLE: ICD-10-CM

## 2017-07-26 LAB
BACTERIA BLD CULT: SIGNIFICANT CHANGE UP
BUN SERPL-MCNC: 14 MG/DL — SIGNIFICANT CHANGE UP (ref 7–23)
CALCIUM SERPL-MCNC: 9.2 MG/DL — SIGNIFICANT CHANGE UP (ref 8.4–10.5)
CHLORIDE SERPL-SCNC: 103 MMOL/L — SIGNIFICANT CHANGE UP (ref 98–107)
CO2 SERPL-SCNC: 27 MMOL/L — SIGNIFICANT CHANGE UP (ref 22–31)
CREAT SERPL-MCNC: 0.69 MG/DL — SIGNIFICANT CHANGE UP (ref 0.5–1.3)
GLUCOSE SERPL-MCNC: 89 MG/DL — SIGNIFICANT CHANGE UP (ref 70–99)
HCT VFR BLD CALC: 32.9 % — LOW (ref 34.5–45)
HGB BLD-MCNC: 10.9 G/DL — LOW (ref 11.5–15.5)
MAGNESIUM SERPL-MCNC: 1.9 MG/DL — SIGNIFICANT CHANGE UP (ref 1.6–2.6)
MCHC RBC-ENTMCNC: 28.9 PG — SIGNIFICANT CHANGE UP (ref 27–34)
MCHC RBC-ENTMCNC: 33.1 % — SIGNIFICANT CHANGE UP (ref 32–36)
MCV RBC AUTO: 87.3 FL — SIGNIFICANT CHANGE UP (ref 80–100)
NRBC # FLD: 0 — SIGNIFICANT CHANGE UP
PLATELET # BLD AUTO: 212 K/UL — SIGNIFICANT CHANGE UP (ref 150–400)
PMV BLD: 10.2 FL — SIGNIFICANT CHANGE UP (ref 7–13)
POTASSIUM SERPL-MCNC: 4 MMOL/L — SIGNIFICANT CHANGE UP (ref 3.5–5.3)
POTASSIUM SERPL-SCNC: 4 MMOL/L — SIGNIFICANT CHANGE UP (ref 3.5–5.3)
RBC # BLD: 3.77 M/UL — LOW (ref 3.8–5.2)
RBC # FLD: 13.8 % — SIGNIFICANT CHANGE UP (ref 10.3–14.5)
SODIUM SERPL-SCNC: 142 MMOL/L — SIGNIFICANT CHANGE UP (ref 135–145)
WBC # BLD: 4.43 K/UL — SIGNIFICANT CHANGE UP (ref 3.8–10.5)
WBC # FLD AUTO: 4.43 K/UL — SIGNIFICANT CHANGE UP (ref 3.8–10.5)

## 2017-07-26 PROCEDURE — 99232 SBSQ HOSP IP/OBS MODERATE 35: CPT

## 2017-07-26 PROCEDURE — 76856 US EXAM PELVIC COMPLETE: CPT | Mod: 26

## 2017-07-26 PROCEDURE — 99222 1ST HOSP IP/OBS MODERATE 55: CPT | Mod: GC

## 2017-07-26 RX ADMIN — BUPRENORPHINE AND NALOXONE 4 TABLET(S): 2; .5 TABLET SUBLINGUAL at 13:00

## 2017-07-26 RX ADMIN — Medication 500 MILLIGRAM(S): at 07:53

## 2017-07-26 RX ADMIN — Medication 300 MILLIGRAM(S): at 09:57

## 2017-07-26 RX ADMIN — Medication 500 MILLIGRAM(S): at 18:39

## 2017-07-26 RX ADMIN — Medication 500 MILLIGRAM(S): at 12:11

## 2017-07-26 RX ADMIN — Medication 500 MILLIGRAM(S): at 17:54

## 2017-07-26 RX ADMIN — QUETIAPINE FUMARATE 400 MILLIGRAM(S): 200 TABLET, FILM COATED ORAL at 21:28

## 2017-07-26 RX ADMIN — Medication 300 MILLIGRAM(S): at 12:10

## 2017-07-26 RX ADMIN — GABAPENTIN 800 MILLIGRAM(S): 400 CAPSULE ORAL at 14:16

## 2017-07-26 RX ADMIN — Medication 20 MILLIGRAM(S): at 06:58

## 2017-07-26 RX ADMIN — Medication 500 MILLIGRAM(S): at 23:40

## 2017-07-26 RX ADMIN — Medication 1 PATCH: at 12:11

## 2017-07-26 RX ADMIN — GABAPENTIN 800 MILLIGRAM(S): 400 CAPSULE ORAL at 06:53

## 2017-07-26 RX ADMIN — Medication 1 TABLET(S): at 12:11

## 2017-07-26 RX ADMIN — Medication 1 PATCH: at 12:08

## 2017-07-26 RX ADMIN — Medication 100 MILLIGRAM(S): at 12:11

## 2017-07-26 RX ADMIN — GABAPENTIN 800 MILLIGRAM(S): 400 CAPSULE ORAL at 21:28

## 2017-07-26 RX ADMIN — Medication 300 MILLIGRAM(S): at 21:28

## 2017-07-26 RX ADMIN — ESCITALOPRAM OXALATE 20 MILLIGRAM(S): 10 TABLET, FILM COATED ORAL at 21:28

## 2017-07-26 RX ADMIN — PANTOPRAZOLE SODIUM 40 MILLIGRAM(S): 20 TABLET, DELAYED RELEASE ORAL at 06:53

## 2017-07-26 RX ADMIN — Medication 2000 UNIT(S): at 12:11

## 2017-07-26 RX ADMIN — Medication 300 MILLIGRAM(S): at 17:54

## 2017-07-26 RX ADMIN — Medication 1 MILLIGRAM(S): at 21:27

## 2017-07-26 RX ADMIN — Medication 500 MILLIGRAM(S): at 06:53

## 2017-07-26 RX ADMIN — BUPRENORPHINE AND NALOXONE 4 TABLET(S): 2; .5 TABLET SUBLINGUAL at 12:19

## 2017-07-26 NOTE — PROGRESS NOTE ADULT - ASSESSMENT
EKG - not in chart   Echo - Normal LV function     1) B/L lower extremity edema - CT abd shows possible left ovarian mass, echo shows Normal LV and RVfunction , get gyn consult    2) Cellulitis - on cephalexin    3) DVT prophylasix - on SC lovenox

## 2017-07-26 NOTE — CONSULT NOTE ADULT - PROBLEM SELECTOR RECOMMENDATION 9
- f/u MRI  - patient counselled on DDx of adnexal mass  - patient taken to MRI before could complete exam, will return in AM to complete exam and give final recommendations    NEPTALI Anthony PGY3 - patient counselled on DDx of adnexal mass  - as mass is concerning for neoplasm given complex nature and vascularity in the setting of FH of pre-menopausal breast cancer in first degree relative, case was discussed w/ GYN Oncology  - Pt to be seen and exam to be performed by GYN Oncology    Pt seen and d/w Dr. Florinda Anthony PGY3

## 2017-07-26 NOTE — PROGRESS NOTE ADULT - SUBJECTIVE AND OBJECTIVE BOX
chief complaint : lower ext edema/ erythema of toe    SUBJECTIVE / OVERNIGHT EVENTS:  denies chest pain, shortness of breath, nausea, v    MEDICATIONS  (STANDING):  nicotine - 21 mG/24Hr(s) Patch 1 patch Transdermal daily  pantoprazole    Tablet 40 milliGRAM(s) Oral before breakfast  senna 2 Tablet(s) Oral at bedtime  docusate sodium 100 milliGRAM(s) Oral two times a day  QUEtiapine 400 milliGRAM(s) Oral at bedtime  topiramate 100 milliGRAM(s) Oral daily  enoxaparin Injectable 40 milliGRAM(s) SubCutaneous every 24 hours  buprenorphine 8 mG/naloxone 2 mG SL  Tablet 4 Tablet(s) SubLingual daily  cephalexin 500 milliGRAM(s) Oral four times a day  prazosin 1 milliGRAM(s) Oral at bedtime  naproxen 500 milliGRAM(s) Oral two times a day  gabapentin 800 milliGRAM(s) Oral three times a day  escitalopram 20 milliGRAM(s) Oral at bedtime  traZODone 300 milliGRAM(s) Oral at bedtime  furosemide    Tablet 20 milliGRAM(s) Oral daily  multivitamin 1 Tablet(s) Oral daily  cholecalciferol 2000 Unit(s) Oral daily  ferrous    sulfate Liquid 300 milliGRAM(s) Oral three times a day with meals    MEDICATIONS  (PRN):  acetaminophen   Tablet 650 milliGRAM(s) Oral every 6 hours PRN For Temp greater than 38 C (100.4 F)  ALBUTerol/ipratropium for Nebulization 3 milliLiter(s) Nebulizer every 6 hours PRN Shortness of Breath and/or Wheezing      Vital Signs Last 24 Hrs  T(C): 36.8 (26 Jul 2017 21:25), Max: 36.8 (26 Jul 2017 21:25)  T(F): 98.2 (26 Jul 2017 21:25), Max: 98.2 (26 Jul 2017 21:25)  HR: 62 (26 Jul 2017 21:25) (62 - 80)  BP: 122/76 (26 Jul 2017 21:25) (96/57 - 122/76)  BP(mean): --  RR: 18 (26 Jul 2017 21:25) (18 - 18)  SpO2: 97% (26 Jul 2017 21:25) (94% - 97%)      Constitutional: No fever, fatigue  Skin: No rash.  Eyes: No recent vision problems or eye pain.  ENT: No congestion, ear pain, or sore throat.  Cardiovascular: No chest pain or palpation.  Respiratory: No cough, shortness of breath, congestion, or wheezing.  Gastrointestinal: No abdominal pain, nausea, vomiting, or diarrhea.  Genitourinary: No dysuria.  Musculoskeletal: No joint swelling.  Neurologic: No headache.    PHYSICAL EXAM:  GENERAL: NAD  EYES: EOMI, PERRLA  NECK: Supple, No JVD  CHEST/LUNG: dec breath sounds at bases  HEART:  S1 , S2 +  ABDOMEN: soft, bs+  EXTREMITIES: trace edema+   NEUROLOGY:alert awake oriented    LABS:  07-26    142  |  103  |  14  ----------------------------<  89  4.0   |  27  |  0.69    Ca    9.2      26 Jul 2017 06:30  Phos  3.3     07-25  Mg     1.9     07-26      Creatinine Trend: 0.69 <--, 0.63 <--, 0.63 <--, 0.66 <--, 0.76 <--                        10.9   4.43  )-----------( 212      ( 26 Jul 2017 06:30 )             32.9     Urine Studies:

## 2017-07-26 NOTE — PROGRESS NOTE ADULT - SUBJECTIVE AND OBJECTIVE BOX
INTERVAL HISTORY: pt seen in am, complains of burnign pain in legs, no cp no SOB  	  MEDICATIONS:  enoxaparin Injectable 40 milliGRAM(s) SubCutaneous every 24 hours  prazosin 1 milliGRAM(s) Oral at bedtime  furosemide    Tablet 20 milliGRAM(s) Oral daily    cephalexin 500 milliGRAM(s) Oral four times a day    ALBUTerol/ipratropium for Nebulization 3 milliLiter(s) Nebulizer every 6 hours PRN    QUEtiapine 400 milliGRAM(s) Oral at bedtime  topiramate 100 milliGRAM(s) Oral daily  acetaminophen   Tablet 650 milliGRAM(s) Oral every 6 hours PRN  buprenorphine 8 mG/naloxone 2 mG SL  Tablet 4 Tablet(s) SubLingual daily  naproxen 500 milliGRAM(s) Oral two times a day  gabapentin 800 milliGRAM(s) Oral three times a day  escitalopram 20 milliGRAM(s) Oral at bedtime  traZODone 300 milliGRAM(s) Oral at bedtime    pantoprazole    Tablet 40 milliGRAM(s) Oral before breakfast  senna 2 Tablet(s) Oral at bedtime  docusate sodium 100 milliGRAM(s) Oral two times a day      multivitamin 1 Tablet(s) Oral daily  cholecalciferol 2000 Unit(s) Oral daily  ferrous    sulfate Liquid 300 milliGRAM(s) Oral three times a day with meals      PHYSICAL EXAM:  T(C): 36.4 (07-26-17 @ 14:40), Max: 36.8 (07-25-17 @ 21:33)  HR: 80 (07-26-17 @ 14:40) (70 - 80)  BP: 101/64 (07-26-17 @ 14:40) (96/57 - 134/83)  RR: 18 (07-26-17 @ 14:40) (18 - 18)  SpO2: 94% (07-26-17 @ 14:40) (94% - 95%)  Wt(kg): --  I&O's Summary        Appearance: Normal	  HEENT:   Normal oral mucosa, PERRL, EOMI	  Cardiovascular: Normal S1 S2, No JVD, No murmurs, No edema  Respiratory: Lungs clear to auscultation	  Gastrointestinal:  Soft, Non-tender, + BS	  Extremities:1+ EDEMA                                    10.9   4.43  )-----------( 212 ( 26 Jul 2017 06:30 )             32.9     07-26    142  |  103  |  14  ----------------------------<  89  4.0   |  27  |  0.69    Ca    9.2      26 Jul 2017 06:30  Phos  3.3     07-25  Mg     1.9     07-26      proBNP:   Lipid Profile:   HgA1c:   TSH:

## 2017-07-26 NOTE — PROGRESS NOTE ADULT - SUBJECTIVE AND OBJECTIVE BOX
VASCULAR SURGERY  Pager: [phone]    INTERVAL EVENTS/SUBJECTIVE:   Still reporting burning pain along lateral aspects of thighs. CT venogram completed yesterday showing ovarian mass, but no central pelvic congestion or thrombosis.   ______________________________________________  OBJECTIVE:   Vital Signs Last 24 Hrs  T(C): 36.3 (26 Jul 2017 06:49), Max: 37.3 (25 Jul 2017 14:50)  T(F): 97.4 (26 Jul 2017 06:49), Max: 99.2 (25 Jul 2017 14:50)  HR: 73 (26 Jul 2017 06:57) (70 - 77)  BP: 122/65 (26 Jul 2017 06:57) (96/57 - 134/83)  BP(mean): --  RR: 18 (26 Jul 2017 06:49) (18 - 18)  SpO2: 95% (26 Jul 2017 06:49) (95% - 95%)    Physical exam:  General: NAD  Bilateral LE warm with swelling L > R. Distal pulses.   Limited ankle movement in L foot due to pain  L leg with blanching erythema    LABS:  CBC Full  -  ( 26 Jul 2017 06:30 )  WBC Count : 4.43 K/uL  Hemoglobin : 10.9 g/dL  Hematocrit : 32.9 %  Platelet Count - Automated : 212 K/uL  Mean Cell Volume : 87.3 fL  Mean Cell Hemoglobin : 28.9 pg  Mean Cell Hemoglobin Concentration : 33.1 %    07-26    142  |  103  |  14  ----------------------------<  89  4.0   |  27  |  0.69    Ca    9.2      26 Jul 2017 06:30  Phos  3.3     07-25  Mg     1.9     07-26      RADIOLOGY:  CT Abdomen and Pelvis w/ IV Cont (07.25.17 @ 13:27)   VESSELS: The inferior vena cava and pelvic veins are patent. No evidence   of venous thrombosis or May Thurner syndrome. No evidence of pelvic   venous congestion.  RETROPERITONEUM: No retroperitoneal or pelvic adenopathy  ABDOMINAL WALL: Within normal limits  MUSCULOSKELETAL: Within normal limits    IMPRESSION:     7 to 8 cm complex left adnexal masses suspicious for ovarian neoplasm.   Further evaluation with contrast MRI  IV considered.    Otherwise no evidence of central pelvic vein thrombosis or congestion.      US Duplex Venous Lower Ext Complete, Bilateral (07.21.17 @ 18:54)   There is normal compressibility of the bilateral common femoral, femoral   and popliteal veins. No calf vein thrombosis is detected.    Doppler examination shows normal spontaneous and phasic flow.    Left lower extremity subcutaneous edema.    IMPRESSION:     No evidence of bilateral lower extremity deep venous thrombosis.

## 2017-07-26 NOTE — CONSULT NOTE ADULT - ATTENDING COMMENTS
Patient seen reviewed care plan with patient,  she has not had gyn exam in 10 year, plan for pelvic exam and pap smear deferred due to involvement of gyn oncology they will preform and exam.  We discussed differential diagnosis with patient and reason for calling oncology consultation.

## 2017-07-26 NOTE — PROGRESS NOTE ADULT - ASSESSMENT
Assessment/Plan: 42y Female presents with 2 weeks of bilateral lower extremity swelling associated with pain. Currently being treated for cellulitis. CT venogram negative for central thrombosis and no LE DVTs. However, shows large left adnexal mass.     - No vascular intervention needed at this time  - Recommend gynecology consult. Awaiting MRI and transvaginal US  - Please page with further questions    Pager [phone]

## 2017-07-27 LAB
HCT VFR BLD CALC: 34.7 % — SIGNIFICANT CHANGE UP (ref 34.5–45)
HGB BLD-MCNC: 11.4 G/DL — LOW (ref 11.5–15.5)
MCHC RBC-ENTMCNC: 28.9 PG — SIGNIFICANT CHANGE UP (ref 27–34)
MCHC RBC-ENTMCNC: 32.9 % — SIGNIFICANT CHANGE UP (ref 32–36)
MCV RBC AUTO: 87.8 FL — SIGNIFICANT CHANGE UP (ref 80–100)
NRBC # FLD: 0 — SIGNIFICANT CHANGE UP
PLATELET # BLD AUTO: 209 K/UL — SIGNIFICANT CHANGE UP (ref 150–400)
PMV BLD: 10.6 FL — SIGNIFICANT CHANGE UP (ref 7–13)
RBC # BLD: 3.95 M/UL — SIGNIFICANT CHANGE UP (ref 3.8–5.2)
RBC # FLD: 13.6 % — SIGNIFICANT CHANGE UP (ref 10.3–14.5)
WBC # BLD: 4.49 K/UL — SIGNIFICANT CHANGE UP (ref 3.8–10.5)
WBC # FLD AUTO: 4.49 K/UL — SIGNIFICANT CHANGE UP (ref 3.8–10.5)

## 2017-07-27 RX ADMIN — GABAPENTIN 800 MILLIGRAM(S): 400 CAPSULE ORAL at 06:39

## 2017-07-27 RX ADMIN — Medication 1 MILLIGRAM(S): at 21:43

## 2017-07-27 RX ADMIN — ESCITALOPRAM OXALATE 20 MILLIGRAM(S): 10 TABLET, FILM COATED ORAL at 21:43

## 2017-07-27 RX ADMIN — Medication 2000 UNIT(S): at 12:15

## 2017-07-27 RX ADMIN — Medication 100 MILLIGRAM(S): at 12:15

## 2017-07-27 RX ADMIN — BUPRENORPHINE AND NALOXONE 4 TABLET(S): 2; .5 TABLET SUBLINGUAL at 12:14

## 2017-07-27 RX ADMIN — Medication 500 MILLIGRAM(S): at 07:36

## 2017-07-27 RX ADMIN — Medication 500 MILLIGRAM(S): at 06:38

## 2017-07-27 RX ADMIN — PANTOPRAZOLE SODIUM 40 MILLIGRAM(S): 20 TABLET, DELAYED RELEASE ORAL at 06:38

## 2017-07-27 RX ADMIN — GABAPENTIN 800 MILLIGRAM(S): 400 CAPSULE ORAL at 21:43

## 2017-07-27 RX ADMIN — Medication 300 MILLIGRAM(S): at 18:01

## 2017-07-27 RX ADMIN — Medication 1 PATCH: at 12:16

## 2017-07-27 RX ADMIN — Medication 20 MILLIGRAM(S): at 06:38

## 2017-07-27 RX ADMIN — Medication 100 MILLIGRAM(S): at 18:02

## 2017-07-27 RX ADMIN — Medication 500 MILLIGRAM(S): at 12:15

## 2017-07-27 RX ADMIN — Medication 300 MILLIGRAM(S): at 10:49

## 2017-07-27 RX ADMIN — Medication 500 MILLIGRAM(S): at 18:02

## 2017-07-27 RX ADMIN — Medication 300 MILLIGRAM(S): at 21:43

## 2017-07-27 RX ADMIN — BUPRENORPHINE AND NALOXONE 4 TABLET(S): 2; .5 TABLET SUBLINGUAL at 13:00

## 2017-07-27 RX ADMIN — QUETIAPINE FUMARATE 400 MILLIGRAM(S): 200 TABLET, FILM COATED ORAL at 21:43

## 2017-07-27 RX ADMIN — Medication 300 MILLIGRAM(S): at 15:03

## 2017-07-27 RX ADMIN — GABAPENTIN 800 MILLIGRAM(S): 400 CAPSULE ORAL at 15:02

## 2017-07-27 RX ADMIN — Medication 500 MILLIGRAM(S): at 06:39

## 2017-07-27 RX ADMIN — Medication 1 TABLET(S): at 12:14

## 2017-07-27 RX ADMIN — SENNA PLUS 2 TABLET(S): 8.6 TABLET ORAL at 21:43

## 2017-07-27 RX ADMIN — Medication 500 MILLIGRAM(S): at 18:01

## 2017-07-27 RX ADMIN — Medication 500 MILLIGRAM(S): at 23:47

## 2017-07-27 NOTE — PROGRESS NOTE ADULT - ASSESSMENT
EKG - not in chart   Echo - Normal LV function     1) B/L lower extremity edema - CT abd shows possible left ovarian mass, echo shows Normal LV and RVfunction , possible surgery on monday , b/l lower ext edema likely sec to local obstruction of IVC or lymphduct    2) Cellulitis - on cephalexin    3) DVT prophylasix - on SC lovenox

## 2017-07-27 NOTE — PROGRESS NOTE ADULT - SUBJECTIVE AND OBJECTIVE BOX
INTERVAL HISTORY: Pt is lying in bed cofortable, upset secondary to possible diagnosis of ovarian CA, for possible OR on monday   	  MEDICATIONS:  enoxaparin Injectable 40 milliGRAM(s) SubCutaneous every 24 hours  prazosin 1 milliGRAM(s) Oral at bedtime  furosemide    Tablet 20 milliGRAM(s) Oral daily    cephalexin 500 milliGRAM(s) Oral four times a day    ALBUTerol/ipratropium for Nebulization 3 milliLiter(s) Nebulizer every 6 hours PRN    QUEtiapine 400 milliGRAM(s) Oral at bedtime  topiramate 100 milliGRAM(s) Oral daily  acetaminophen   Tablet 650 milliGRAM(s) Oral every 6 hours PRN  buprenorphine 8 mG/naloxone 2 mG SL  Tablet 4 Tablet(s) SubLingual daily  naproxen 500 milliGRAM(s) Oral two times a day  gabapentin 800 milliGRAM(s) Oral three times a day  escitalopram 20 milliGRAM(s) Oral at bedtime  traZODone 300 milliGRAM(s) Oral at bedtime    pantoprazole    Tablet 40 milliGRAM(s) Oral before breakfast  senna 2 Tablet(s) Oral at bedtime  docusate sodium 100 milliGRAM(s) Oral two times a day      multivitamin 1 Tablet(s) Oral daily  cholecalciferol 2000 Unit(s) Oral daily  ferrous    sulfate Liquid 300 milliGRAM(s) Oral three times a day with meals      PHYSICAL EXAM:  T(C): 36.4 (07-27-17 @ 12:59), Max: 36.8 (07-26-17 @ 21:25)  HR: 74 (07-27-17 @ 12:59) (55 - 74)  BP: 122/73 (07-27-17 @ 12:59) (115/73 - 122/76)  RR: 18 (07-27-17 @ 12:59) (18 - 18)  SpO2: 97% (07-27-17 @ 12:59) (95% - 97%)  Wt(kg): --  I&O's Summary        Appearance: Normal	  HEENT:   Normal oral mucosa, PERRL, EOMI	  Cardiovascular: Normal S1 S2, No JVD, No murmurs, No edema  Respiratory: Lungs clear to auscultation	  Gastrointestinal:  Soft, Non-tender, + BS	  Extremities: 2+ edema                                    11.4   4.49  )-----------( 209      ( 27 Jul 2017 07:27 )             34.7     07-26    142  |  103  |  14  ----------------------------<  89  4.0   |  27  |  0.69    Ca    9.2      26 Jul 2017 06:30  Mg     1.9     07-26      proBNP:   Lipid Profile:   HgA1c:   TSH:

## 2017-07-27 NOTE — PROGRESS NOTE ADULT - SUBJECTIVE AND OBJECTIVE BOX
chief complaint : lower ext edema/ erythema of toe    SUBJECTIVE / OVERNIGHT EVENTS:  denies chest pain, shortness of breath, nausea, v    MEDICATIONS  (STANDING):  nicotine - 21 mG/24Hr(s) Patch 1 patch Transdermal daily  pantoprazole    Tablet 40 milliGRAM(s) Oral before breakfast  senna 2 Tablet(s) Oral at bedtime  docusate sodium 100 milliGRAM(s) Oral two times a day  QUEtiapine 400 milliGRAM(s) Oral at bedtime  topiramate 100 milliGRAM(s) Oral daily  enoxaparin Injectable 40 milliGRAM(s) SubCutaneous every 24 hours  buprenorphine 8 mG/naloxone 2 mG SL  Tablet 4 Tablet(s) SubLingual daily  cephalexin 500 milliGRAM(s) Oral four times a day  prazosin 1 milliGRAM(s) Oral at bedtime  naproxen 500 milliGRAM(s) Oral two times a day  gabapentin 800 milliGRAM(s) Oral three times a day  escitalopram 20 milliGRAM(s) Oral at bedtime  traZODone 300 milliGRAM(s) Oral at bedtime  furosemide    Tablet 20 milliGRAM(s) Oral daily  multivitamin 1 Tablet(s) Oral daily  cholecalciferol 2000 Unit(s) Oral daily  ferrous    sulfate Liquid 300 milliGRAM(s) Oral three times a day with meals    MEDICATIONS  (PRN):  acetaminophen   Tablet 650 milliGRAM(s) Oral every 6 hours PRN For Temp greater than 38 C (100.4 F)  ALBUTerol/ipratropium for Nebulization 3 milliLiter(s) Nebulizer every 6 hours PRN Shortness of Breath and/or Wheezing      Vital Signs Last 24 Hrs  T(C): 36.7 (27 Jul 2017 21:39), Max: 36.7 (27 Jul 2017 21:39)  T(F): 98.1 (27 Jul 2017 21:39), Max: 98.1 (27 Jul 2017 21:39)  HR: 69 (27 Jul 2017 21:39) (55 - 74)  BP: 116/73 (27 Jul 2017 21:39) (115/73 - 122/73)  BP(mean): --  RR: 18 (27 Jul 2017 21:39) (18 - 18)  SpO2: 97% (27 Jul 2017 21:39) (95% - 97%)    Constitutional: No fever, fatigue  Skin: No rash.  Eyes: No recent vision problems or eye pain.  ENT: No congestion, ear pain, or sore throat.  Cardiovascular: No chest pain or palpation.  Respiratory: No cough, shortness of breath, congestion, or wheezing.  Gastrointestinal: No abdominal pain, nausea, vomiting, or diarrhea.  Genitourinary: No dysuria.  Musculoskeletal: No joint swelling.  Neurologic: No headache.    PHYSICAL EXAM:  GENERAL: NAD  EYES: EOMI, PERRLA  NECK: Supple, No JVD  CHEST/LUNG: dec breath sounds at bases  HEART:  S1 , S2 +  ABDOMEN: soft, bs+  EXTREMITIES: trace edema+   NEUROLOGY:alert awake oriented    LABS:  07-26    142  |  103  |  14  ----------------------------<  89  4.0   |  27  |  0.69    Ca    9.2      26 Jul 2017 06:30  Mg     1.9     07-26      Creatinine Trend: 0.69 <--, 0.63 <--, 0.63 <--, 0.66 <--, 0.76 <--                        11.4   4.49  )-----------( 209      ( 27 Jul 2017 07:27 )             34.7     Urine Studies:

## 2017-07-28 ENCOUNTER — TRANSCRIPTION ENCOUNTER (OUTPATIENT)
Age: 42
End: 2017-07-28

## 2017-07-28 LAB
BUN SERPL-MCNC: 14 MG/DL — SIGNIFICANT CHANGE UP (ref 7–23)
CALCIUM SERPL-MCNC: 9 MG/DL — SIGNIFICANT CHANGE UP (ref 8.4–10.5)
CHLORIDE SERPL-SCNC: 103 MMOL/L — SIGNIFICANT CHANGE UP (ref 98–107)
CO2 SERPL-SCNC: 30 MMOL/L — SIGNIFICANT CHANGE UP (ref 22–31)
CREAT SERPL-MCNC: 0.77 MG/DL — SIGNIFICANT CHANGE UP (ref 0.5–1.3)
GLUCOSE SERPL-MCNC: 111 MG/DL — HIGH (ref 70–99)
HCT VFR BLD CALC: 34.1 % — LOW (ref 34.5–45)
HGB BLD-MCNC: 10.9 G/DL — LOW (ref 11.5–15.5)
MCHC RBC-ENTMCNC: 27.9 PG — SIGNIFICANT CHANGE UP (ref 27–34)
MCHC RBC-ENTMCNC: 32 % — SIGNIFICANT CHANGE UP (ref 32–36)
MCV RBC AUTO: 87.4 FL — SIGNIFICANT CHANGE UP (ref 80–100)
NRBC # FLD: 0 — SIGNIFICANT CHANGE UP
PLATELET # BLD AUTO: 199 K/UL — SIGNIFICANT CHANGE UP (ref 150–400)
PMV BLD: 10.6 FL — SIGNIFICANT CHANGE UP (ref 7–13)
POTASSIUM SERPL-MCNC: 3.8 MMOL/L — SIGNIFICANT CHANGE UP (ref 3.5–5.3)
POTASSIUM SERPL-SCNC: 3.8 MMOL/L — SIGNIFICANT CHANGE UP (ref 3.5–5.3)
RBC # BLD: 3.9 M/UL — SIGNIFICANT CHANGE UP (ref 3.8–5.2)
RBC # FLD: 13.8 % — SIGNIFICANT CHANGE UP (ref 10.3–14.5)
SODIUM SERPL-SCNC: 144 MMOL/L — SIGNIFICANT CHANGE UP (ref 135–145)
WBC # BLD: 4.67 K/UL — SIGNIFICANT CHANGE UP (ref 3.8–10.5)
WBC # FLD AUTO: 4.67 K/UL — SIGNIFICANT CHANGE UP (ref 3.8–10.5)

## 2017-07-28 PROCEDURE — 99232 SBSQ HOSP IP/OBS MODERATE 35: CPT

## 2017-07-28 RX ORDER — BUPRENORPHINE AND NALOXONE 2; .5 MG/1; MG/1
4 TABLET SUBLINGUAL
Qty: 0 | Refills: 0 | COMMUNITY
Start: 2017-07-28

## 2017-07-28 RX ORDER — HYDROXYZINE HCL 10 MG
25 TABLET ORAL ONCE
Qty: 0 | Refills: 0 | Status: COMPLETED | OUTPATIENT
Start: 2017-07-28 | End: 2017-07-28

## 2017-07-28 RX ADMIN — Medication 1 PATCH: at 12:00

## 2017-07-28 RX ADMIN — Medication 500 MILLIGRAM(S): at 06:25

## 2017-07-28 RX ADMIN — Medication 1 MILLIGRAM(S): at 22:12

## 2017-07-28 RX ADMIN — GABAPENTIN 800 MILLIGRAM(S): 400 CAPSULE ORAL at 22:13

## 2017-07-28 RX ADMIN — Medication 100 MILLIGRAM(S): at 12:14

## 2017-07-28 RX ADMIN — Medication 1 TABLET(S): at 12:11

## 2017-07-28 RX ADMIN — Medication 500 MILLIGRAM(S): at 12:12

## 2017-07-28 RX ADMIN — Medication 20 MILLIGRAM(S): at 05:55

## 2017-07-28 RX ADMIN — Medication 300 MILLIGRAM(S): at 12:14

## 2017-07-28 RX ADMIN — Medication 1 PATCH: at 12:13

## 2017-07-28 RX ADMIN — Medication 300 MILLIGRAM(S): at 17:46

## 2017-07-28 RX ADMIN — BUPRENORPHINE AND NALOXONE 4 TABLET(S): 2; .5 TABLET SUBLINGUAL at 12:18

## 2017-07-28 RX ADMIN — GABAPENTIN 800 MILLIGRAM(S): 400 CAPSULE ORAL at 12:15

## 2017-07-28 RX ADMIN — Medication 300 MILLIGRAM(S): at 22:13

## 2017-07-28 RX ADMIN — GABAPENTIN 800 MILLIGRAM(S): 400 CAPSULE ORAL at 05:55

## 2017-07-28 RX ADMIN — Medication 500 MILLIGRAM(S): at 05:56

## 2017-07-28 RX ADMIN — Medication 500 MILLIGRAM(S): at 17:46

## 2017-07-28 RX ADMIN — SENNA PLUS 2 TABLET(S): 8.6 TABLET ORAL at 22:12

## 2017-07-28 RX ADMIN — ESCITALOPRAM OXALATE 20 MILLIGRAM(S): 10 TABLET, FILM COATED ORAL at 22:13

## 2017-07-28 RX ADMIN — QUETIAPINE FUMARATE 400 MILLIGRAM(S): 200 TABLET, FILM COATED ORAL at 22:12

## 2017-07-28 RX ADMIN — Medication 25 MILLIGRAM(S): at 15:54

## 2017-07-28 RX ADMIN — PANTOPRAZOLE SODIUM 40 MILLIGRAM(S): 20 TABLET, DELAYED RELEASE ORAL at 05:56

## 2017-07-28 RX ADMIN — Medication 300 MILLIGRAM(S): at 09:36

## 2017-07-28 RX ADMIN — Medication 2000 UNIT(S): at 12:13

## 2017-07-28 NOTE — PROGRESS NOTE ADULT - SUBJECTIVE AND OBJECTIVE BOX
INTERVAL HISTORY: pt is lying in bed comfortable, not in distress, stressed over losing rehab bed,   	  MEDICATIONS:  enoxaparin Injectable 40 milliGRAM(s) SubCutaneous every 24 hours  prazosin 1 milliGRAM(s) Oral at bedtime  furosemide    Tablet 20 milliGRAM(s) Oral daily    cephalexin 500 milliGRAM(s) Oral four times a day    ALBUTerol/ipratropium for Nebulization 3 milliLiter(s) Nebulizer every 6 hours PRN    QUEtiapine 400 milliGRAM(s) Oral at bedtime  topiramate 100 milliGRAM(s) Oral daily  acetaminophen   Tablet 650 milliGRAM(s) Oral every 6 hours PRN  buprenorphine 8 mG/naloxone 2 mG SL  Tablet 4 Tablet(s) SubLingual daily  naproxen 500 milliGRAM(s) Oral two times a day  gabapentin 800 milliGRAM(s) Oral three times a day  escitalopram 20 milliGRAM(s) Oral at bedtime  traZODone 300 milliGRAM(s) Oral at bedtime    pantoprazole    Tablet 40 milliGRAM(s) Oral before breakfast  senna 2 Tablet(s) Oral at bedtime  docusate sodium 100 milliGRAM(s) Oral two times a day      multivitamin 1 Tablet(s) Oral daily  cholecalciferol 2000 Unit(s) Oral daily  ferrous    sulfate Liquid 300 milliGRAM(s) Oral three times a day with meals      PHYSICAL EXAM:  T(C): 36.8 (07-28-17 @ 05:51), Max: 36.8 (07-28-17 @ 05:51)  HR: 77 (07-28-17 @ 05:51) (69 - 77)  BP: 111/79 (07-28-17 @ 05:51) (111/79 - 122/73)  RR: 18 (07-28-17 @ 05:51) (18 - 18)  SpO2: 96% (07-28-17 @ 05:51) (96% - 97%)  Wt(kg): --  I&O's Summary        Appearance: Normal	  HEENT:   Normal oral mucosa, PERRL, EOMI	  Cardiovascular: Normal S1 S2, No JVD, No murmurs, No edema  Respiratory: Lungs clear to auscultation	  Gastrointestinal:  Soft, Non-tender, + BS	  Extremities: 2+ edema                                    10.9   4.67  )-----------( 199      ( 28 Jul 2017 05:50 )             34.1     07-28    144  |  103  |  14  ----------------------------<  111<H>  3.8   |  30  |  0.77    Ca    9.0      28 Jul 2017 05:50      proBNP:   Lipid Profile:   HgA1c:   TSH:

## 2017-07-28 NOTE — PROGRESS NOTE ADULT - ASSESSMENT
42 year old presented with bilateral leg swelling.  When I saw her last weekend,  her legs were not red or warm. she did say they were red before she recieved antibiotics- so the decision was made to give her a treatment course for group a strep cellulitis.    Today she states that her legs are still swollen and there is a slight redness to her right leg. She has been afebrile with a normal WBC.    It is unclear to me that this is cellulitis. My primary suspicion is that this is related to her edema and with dependent edema her legs become more swollen with a slight increase in redness.    I would consider noninfectious causes for her leg pain and swelling.    For the better sake of caution, we can extend her keflex for 5 more days to complete a 10 day course. 42 year old presented with bilateral leg swelling.  When I saw her last weekend,  her legs were not red or warm. she did say they were red before she recieved antibiotics- so the decision was made to give her a treatment course for group a strep cellulitis.    Today she states that her legs are still swollen and there is a slight redness to her right leg. She has been afebrile with a normal WBC.    It is unclear to me that this is cellulitis. My primary suspicion is that this is related to her edema and with dependent edema her legs become more swollen with a slight increase in redness.    I would consider noninfectious causes for her leg pain and swelling. She states she has not had prior episodes so RSD (reflex sympathetic dystrophy) seems less likely.       For the better sake of caution, we can extend her keflex for 5 more days to complete a 10 day course.

## 2017-07-28 NOTE — DISCHARGE NOTE ADULT - PLAN OF CARE
Finished course of antibiotics 7/28 follow up with your PMD in 1 week F/U with Dr. Aragon next week. follow up with your psychiatrist in 1 week return to substance abuse rehab program follow up with your PMD Dr Ahumada in 1 week F/U with Dr. Kwon next week.  GYN MRI may be needed to further evaluate

## 2017-07-28 NOTE — PROGRESS NOTE ADULT - SUBJECTIVE AND OBJECTIVE BOX
chief complaint : lower ext edema/ erythema of toe    SUBJECTIVE / OVERNIGHT EVENTS:  denies chest pain, shortness of breath, nausea, vomiting, wants to go home    MEDICATIONS  (STANDING):  nicotine - 21 mG/24Hr(s) Patch 1 patch Transdermal daily  pantoprazole    Tablet 40 milliGRAM(s) Oral before breakfast  senna 2 Tablet(s) Oral at bedtime  docusate sodium 100 milliGRAM(s) Oral two times a day  QUEtiapine 400 milliGRAM(s) Oral at bedtime  topiramate 100 milliGRAM(s) Oral daily  enoxaparin Injectable 40 milliGRAM(s) SubCutaneous every 24 hours  buprenorphine 8 mG/naloxone 2 mG SL  Tablet 4 Tablet(s) SubLingual daily  cephalexin 500 milliGRAM(s) Oral four times a day  prazosin 1 milliGRAM(s) Oral at bedtime  naproxen 500 milliGRAM(s) Oral two times a day  gabapentin 800 milliGRAM(s) Oral three times a day  escitalopram 20 milliGRAM(s) Oral at bedtime  traZODone 300 milliGRAM(s) Oral at bedtime  furosemide    Tablet 20 milliGRAM(s) Oral daily  multivitamin 1 Tablet(s) Oral daily  cholecalciferol 2000 Unit(s) Oral daily  ferrous    sulfate Liquid 300 milliGRAM(s) Oral three times a day with meals    MEDICATIONS  (PRN):  acetaminophen   Tablet 650 milliGRAM(s) Oral every 6 hours PRN For Temp greater than 38 C (100.4 F)  ALBUTerol/ipratropium for Nebulization 3 milliLiter(s) Nebulizer every 6 hours PRN Shortness of Breath and/or Wheezing      Vital Signs Last 24 Hrs  T(C): 36.4 (28 Jul 2017 14:24), Max: 36.8 (28 Jul 2017 05:51)  T(F): 97.5 (28 Jul 2017 14:24), Max: 98.2 (28 Jul 2017 05:51)  HR: 73 (28 Jul 2017 14:24) (69 - 77)  BP: 120/74 (28 Jul 2017 14:24) (111/79 - 120/74)  BP(mean): --  RR: 18 (28 Jul 2017 14:24) (18 - 18)  SpO2: 96% (28 Jul 2017 14:24) (96% - 97%)    Constitutional: No fever, fatigue  Skin: No rash.  Eyes: No recent vision problems or eye pain.  ENT: No congestion, ear pain, or sore throat.  Cardiovascular: No chest pain or palpation.  Respiratory: No cough, shortness of breath, congestion, or wheezing.  Gastrointestinal: No abdominal pain, nausea, vomiting, or diarrhea.  Genitourinary: No dysuria.  Musculoskeletal: No joint swelling.  Neurologic: No headache.    PHYSICAL EXAM:  GENERAL: NAD  EYES: EOMI, PERRLA  NECK: Supple, No JVD  CHEST/LUNG: dec breath sounds at bases  HEART:  S1 , S2 +  ABDOMEN: soft, bs+  EXTREMITIES: trace edema+   NEUROLOGY:alert awake oriented    LABS:  07-28    144  |  103  |  14  ----------------------------<  111<H>  3.8   |  30  |  0.77    Ca    9.0      28 Jul 2017 05:50      Creatinine Trend: 0.77 <--, 0.69 <--, 0.63 <--, 0.63 <--, 0.66 <--                        10.9   4.67  )-----------( 199      ( 28 Jul 2017 05:50 )             34.1     Urine Studies:

## 2017-07-28 NOTE — DISCHARGE NOTE ADULT - CARE PLAN
Principal Discharge DX:	Cellulitis  Goal:	Finished course of antibiotics 7/28  Instructions for follow-up, activity and diet:	follow up with your PMD in 1 week  Secondary Diagnosis:	Adnexal mass  Instructions for follow-up, activity and diet:	F/U with Dr. Aragon next week.  Secondary Diagnosis:	Bipolar 1 disorder  Instructions for follow-up, activity and diet:	follow up with your psychiatrist in 1 week  Secondary Diagnosis:	Substance abuse  Instructions for follow-up, activity and diet:	return to substance abuse rehab program Principal Discharge DX:	Cellulitis  Goal:	Finished course of antibiotics 7/28  Instructions for follow-up, activity and diet:	follow up with your PMD Dr Ahumada in 1 week  Secondary Diagnosis:	Adnexal mass  Instructions for follow-up, activity and diet:	F/U with Dr. Kwon next week.  GYN MRI may be needed to further evaluate  Secondary Diagnosis:	Bipolar 1 disorder  Instructions for follow-up, activity and diet:	follow up with your psychiatrist in 1 week  Secondary Diagnosis:	Substance abuse  Instructions for follow-up, activity and diet:	return to substance abuse rehab program

## 2017-07-28 NOTE — DISCHARGE NOTE ADULT - MEDICATION SUMMARY - MEDICATIONS TO TAKE
I will START or STAY ON the medications listed below when I get home from the hospital:    NAPROXEN     TAB 375MG  -- 1  by mouth 2 times a day  -- Indication: For Pain    buprenorphine-naloxone 8 mg-2 mg sublingual tablet  -- 4 tab(s) under tongue once a day  -- Indication: For Substance abuse    prazosin 1 mg oral capsule  -- 1 cap(s) by mouth once a day (at bedtime)  -- Indication: For BPH    topiramate 100 mg oral tablet  -- 1 tab(s) by mouth once a day  -- Indication: For nerve pain    GABAPENTIN   TAB 800MG  -- 1  by mouth 3 times a day  -- Indication: For neuropathy    TRAZODONE    TAB 100MG  -- 3  by mouth once a day (at bedtime)  -- Indication: For Bipolar 1 disorder    ESCITALOPRAM TAB 20MG  -- 1  by mouth once a day (at bedtime)  -- Indication: For Bipolar 1 disorder    QUEtiapine 400 mg oral tablet  -- 1 tab(s) by mouth once a day (at bedtime)  -- Indication: For Borderline personality disorder    albuterol 90 mcg/inh inhalation aerosol  -- 2 puff(s) inhaled every 4 hours, As needed, Shortness of Breath and/or Wheezing  -- Indication: For Mild intermittent asthma without complication    FUROSEMIDE   TAB 20MG  -- 1  by mouth once a day  -- Indication: For LE edema    ferrous sulfate 325 mg (65 mg elemental iron) oral tablet  -- 1 tab(s) by mouth 3 times a day    please do not dispense the iron with the red dye as pt allergic to that  -- Check with your doctor before becoming pregnant.  Do not chew, break, or crush.  May discolor urine or feces.    -- Indication: For Anemia    docusate sodium 100 mg oral capsule  -- 1 cap(s) by mouth 2 times a day  -- Indication: For Constipation    senna oral tablet  -- 2 tab(s) by mouth once a day (at bedtime)  -- Indication: For Constipation    pantoprazole 40 mg oral delayed release tablet  -- 1 tab(s) by mouth once a day (before a meal)  -- Indication: For GERD    nicotine 21 mg/24 hr transdermal film, extended release  -- 1 patch by transdermal patch once a day, remove at night  -- Indication: For Smoking cessation    ONE-DAILY    TAB MULT VIT  -- 1  by mouth once a day  -- Indication: For vitamin    VITAMIN D    TAB 2000UNIT  -- 1  by mouth once a day  -- Indication: For vitamin I will START or STAY ON the medications listed below when I get home from the hospital:    NAPROXEN     TAB 375MG  -- 1  by mouth 2 times a day  -- Indication: For Pain    buprenorphine-naloxone 8 mg-2 mg sublingual tablet  -- 4 tab(s) under tongue once a day  -- Indication: For Substance abuse    prazosin 1 mg oral capsule  -- 1 cap(s) by mouth once a day (at bedtime)  -- Indication: For BPH    topiramate 100 mg oral tablet  -- 1 tab(s) by mouth once a day  -- Indication: For nerve pain    GABAPENTIN   TAB 800MG  -- 1  by mouth 3 times a day  -- Indication: For neuropathy    ESCITALOPRAM TAB 20MG  -- 1  by mouth once a day (at bedtime)  -- Indication: For Bipolar 1 disorder    TRAZODONE    TAB 100MG  -- 3  by mouth once a day (at bedtime)  -- Indication: For Bipolar 1 disorder    QUEtiapine 400 mg oral tablet  -- 1 tab(s) by mouth once a day (at bedtime)  -- Indication: For Borderline personality disorder    albuterol 90 mcg/inh inhalation aerosol  -- 2 puff(s) inhaled every 4 hours, As needed, Shortness of Breath and/or Wheezing  -- Indication: For Mild intermittent asthma without complication    lidocaine 5% topical film  -- Apply on skin to affected area once a day. Leave in place 12 hours then remove  -- Indication: For Pain    ferrous sulfate 325 mg (65 mg elemental iron) oral tablet  -- 1 tab(s) by mouth 3 times a day    please do not dispense the iron with the red dye as pt allergic to that  -- Check with your doctor before becoming pregnant.  Do not chew, break, or crush.  May discolor urine or feces.    -- Indication: For Anemia    docusate sodium 100 mg oral capsule  -- 1 cap(s) by mouth 2 times a day  -- Indication: For Constipation    senna oral tablet  -- 2 tab(s) by mouth once a day (at bedtime)  -- Indication: For Constipation    pantoprazole 40 mg oral delayed release tablet  -- 1 tab(s) by mouth once a day (before a meal)  -- Indication: For GERD    nicotine 21 mg/24 hr transdermal film, extended release  -- 1 patch by transdermal patch once a day, remove at night  -- Indication: For Smoking cessation    ONE-DAILY    TAB MULT VIT  -- 1  by mouth once a day  -- Indication: For vitamin    VITAMIN D    TAB 2000UNIT  -- 1  by mouth once a day  -- Indication: For vitamin

## 2017-07-28 NOTE — DISCHARGE NOTE ADULT - MEDICATION SUMMARY - MEDICATIONS TO STOP TAKING
I will STOP taking the medications listed below when I get home from the hospital:    oxyCODONE 30 mg oral tablet  -- 1 tab(s) by mouth 3 times a day MDD:3

## 2017-07-28 NOTE — DISCHARGE NOTE ADULT - CARE PROVIDER_API CALL
Chapincito Ahumada (ACE), Internal Medicine  [id number] [place] [address]  Phone: [phone]  Fax: [phone]    Mary Kwon), Gynecologic Oncology; Obstetrics and Gynecology  [address] [place] [address]  Phone: [phone]  Fax: [phone]

## 2017-07-28 NOTE — PROGRESS NOTE ADULT - SUBJECTIVE AND OBJECTIVE BOX
Pt cleared for discharge from our standpoint. To follow up with GYN oncology Dr Mary Kwon as an outpatient for workup and outpatient surgical planning. Surgery to be booked next week, no need to be kept in house until that point     Dw Dr christine Jay R2

## 2017-07-28 NOTE — DISCHARGE NOTE ADULT - ADDITIONAL INSTRUCTIONS
Follow up with your PMD  Dr Ahumada within 1 week. Call for an appointment-  Follow up with GYN Dr CAM Kwon within 1 week,. Call for appointment - [phone]  Take medications as prescribed

## 2017-07-28 NOTE — DISCHARGE NOTE ADULT - PATIENT PORTAL LINK FT
“You can access the [facility] Patient Portal, offered by [facility], by registering with the following website: [email or web address]”

## 2017-07-28 NOTE — PROGRESS NOTE ADULT - SUBJECTIVE AND OBJECTIVE BOX
Follow Up:      Inverval History/ROS:Patient is a 42y old  Female who presents with a chief complaint of B/L LE edema and pain of 2 weeks in duration at presentation.    At this time, she complains of swelling to her legs, the right leg worse than the left. She notes some redness to the laeral aspect of her leg.    No fever.       Allergies    Alupent (Unknown)  ascorbic acid (Unknown)    Intolerances        ANTIMICROBIALS:  cephalexin 500 four times a day      OTHER MEDS:  nicotine - 21 mG/24Hr(s) Patch 1 patch Transdermal daily  pantoprazole    Tablet 40 milliGRAM(s) Oral before breakfast  senna 2 Tablet(s) Oral at bedtime  docusate sodium 100 milliGRAM(s) Oral two times a day  QUEtiapine 400 milliGRAM(s) Oral at bedtime  topiramate 100 milliGRAM(s) Oral daily  enoxaparin Injectable 40 milliGRAM(s) SubCutaneous every 24 hours  acetaminophen   Tablet 650 milliGRAM(s) Oral every 6 hours PRN  ALBUTerol/ipratropium for Nebulization 3 milliLiter(s) Nebulizer every 6 hours PRN  buprenorphine 8 mG/naloxone 2 mG SL  Tablet 4 Tablet(s) SubLingual daily  prazosin 1 milliGRAM(s) Oral at bedtime  naproxen 500 milliGRAM(s) Oral two times a day  gabapentin 800 milliGRAM(s) Oral three times a day  escitalopram 20 milliGRAM(s) Oral at bedtime  traZODone 300 milliGRAM(s) Oral at bedtime  furosemide    Tablet 20 milliGRAM(s) Oral daily  multivitamin 1 Tablet(s) Oral daily  cholecalciferol 2000 Unit(s) Oral daily  ferrous    sulfate Liquid 300 milliGRAM(s) Oral three times a day with meals      Vital Signs Last 24 Hrs  T(C): 36.4 (28 Jul 2017 14:24), Max: 36.8 (28 Jul 2017 05:51)  T(F): 97.5 (28 Jul 2017 14:24), Max: 98.2 (28 Jul 2017 05:51)  HR: 73 (28 Jul 2017 14:24) (69 - 77)  BP: 120/74 (28 Jul 2017 14:24) (111/79 - 120/74)  BP(mean): --  RR: 18 (28 Jul 2017 14:24) (18 - 18)  SpO2: 96% (28 Jul 2017 14:24) (96% - 97%)    PHYSICAL EXAM:  General: [x ] non-toxic  HEAD/EYES: [ ] PERRL x[ ] white sclera [ ] icterus  ENT:  [ ] normal [x ] supple [ ] thrush [ ] pharyngeal exudate  Cardiovascular:   [ ] murmur [ x] normal [ ] PPM/AICD  Respiratory:  [ x] clear to ausculation bilaterally  GI:  [ x] soft, non-tender, normal bowel sounds  :  [ ] velázquez [ ] no CVA tenderness   Musculoskeletal:  [ x] no synovitis  Neurologic:  [ ] non-focal exam   Skin:  [x ] minimal redness to her right leg, bilateral le edema   Lymph: [x ] no lymphadenopathy  Psychiatric:  [ ] appropriate affect [x ] alert & oriented  Lines:  [ x] no phlebitis [ ] central line                                10.9   4.67  )-----------( 199      ( 28 Jul 2017 05:50 )             34.1       07-28    144  |  103  |  14  ----------------------------<  111<H>  3.8   |  30  |  0.77    Ca    9.0      28 Jul 2017 05:50            MICROBIOLOGY:    RADIOLOGY:

## 2017-07-29 LAB
BUN SERPL-MCNC: 14 MG/DL — SIGNIFICANT CHANGE UP (ref 7–23)
CALCIUM SERPL-MCNC: 9.1 MG/DL — SIGNIFICANT CHANGE UP (ref 8.4–10.5)
CHLORIDE SERPL-SCNC: 103 MMOL/L — SIGNIFICANT CHANGE UP (ref 98–107)
CO2 SERPL-SCNC: 27 MMOL/L — SIGNIFICANT CHANGE UP (ref 22–31)
CREAT SERPL-MCNC: 0.7 MG/DL — SIGNIFICANT CHANGE UP (ref 0.5–1.3)
GLUCOSE SERPL-MCNC: 103 MG/DL — HIGH (ref 70–99)
HCT VFR BLD CALC: 34.2 % — LOW (ref 34.5–45)
HGB BLD-MCNC: 11.1 G/DL — LOW (ref 11.5–15.5)
MCHC RBC-ENTMCNC: 28.2 PG — SIGNIFICANT CHANGE UP (ref 27–34)
MCHC RBC-ENTMCNC: 32.5 % — SIGNIFICANT CHANGE UP (ref 32–36)
MCV RBC AUTO: 86.8 FL — SIGNIFICANT CHANGE UP (ref 80–100)
NRBC # FLD: 0 — SIGNIFICANT CHANGE UP
PLATELET # BLD AUTO: 207 K/UL — SIGNIFICANT CHANGE UP (ref 150–400)
PMV BLD: 10.3 FL — SIGNIFICANT CHANGE UP (ref 7–13)
POTASSIUM SERPL-MCNC: 3.7 MMOL/L — SIGNIFICANT CHANGE UP (ref 3.5–5.3)
POTASSIUM SERPL-SCNC: 3.7 MMOL/L — SIGNIFICANT CHANGE UP (ref 3.5–5.3)
RBC # BLD: 3.94 M/UL — SIGNIFICANT CHANGE UP (ref 3.8–5.2)
RBC # FLD: 13.4 % — SIGNIFICANT CHANGE UP (ref 10.3–14.5)
SODIUM SERPL-SCNC: 142 MMOL/L — SIGNIFICANT CHANGE UP (ref 135–145)
WBC # BLD: 4.91 K/UL — SIGNIFICANT CHANGE UP (ref 3.8–10.5)
WBC # FLD AUTO: 4.91 K/UL — SIGNIFICANT CHANGE UP (ref 3.8–10.5)

## 2017-07-29 RX ADMIN — Medication 20 MILLIGRAM(S): at 05:32

## 2017-07-29 RX ADMIN — Medication 500 MILLIGRAM(S): at 17:50

## 2017-07-29 RX ADMIN — Medication 100 MILLIGRAM(S): at 11:40

## 2017-07-29 RX ADMIN — Medication 100 MILLIGRAM(S): at 05:31

## 2017-07-29 RX ADMIN — SENNA PLUS 2 TABLET(S): 8.6 TABLET ORAL at 22:43

## 2017-07-29 RX ADMIN — Medication 500 MILLIGRAM(S): at 05:31

## 2017-07-29 RX ADMIN — QUETIAPINE FUMARATE 400 MILLIGRAM(S): 200 TABLET, FILM COATED ORAL at 22:44

## 2017-07-29 RX ADMIN — Medication 500 MILLIGRAM(S): at 11:40

## 2017-07-29 RX ADMIN — GABAPENTIN 800 MILLIGRAM(S): 400 CAPSULE ORAL at 05:32

## 2017-07-29 RX ADMIN — Medication 500 MILLIGRAM(S): at 17:51

## 2017-07-29 RX ADMIN — Medication 300 MILLIGRAM(S): at 17:50

## 2017-07-29 RX ADMIN — Medication 300 MILLIGRAM(S): at 22:44

## 2017-07-29 RX ADMIN — Medication 500 MILLIGRAM(S): at 06:01

## 2017-07-29 RX ADMIN — BUPRENORPHINE AND NALOXONE 4 TABLET(S): 2; .5 TABLET SUBLINGUAL at 11:40

## 2017-07-29 RX ADMIN — ESCITALOPRAM OXALATE 20 MILLIGRAM(S): 10 TABLET, FILM COATED ORAL at 22:43

## 2017-07-29 RX ADMIN — Medication 1 PATCH: at 12:35

## 2017-07-29 RX ADMIN — Medication 300 MILLIGRAM(S): at 11:40

## 2017-07-29 RX ADMIN — Medication 1 PATCH: at 11:41

## 2017-07-29 RX ADMIN — Medication 500 MILLIGRAM(S): at 00:02

## 2017-07-29 RX ADMIN — GABAPENTIN 800 MILLIGRAM(S): 400 CAPSULE ORAL at 22:44

## 2017-07-29 RX ADMIN — Medication 2000 UNIT(S): at 11:41

## 2017-07-29 RX ADMIN — Medication 1 TABLET(S): at 11:40

## 2017-07-29 RX ADMIN — GABAPENTIN 800 MILLIGRAM(S): 400 CAPSULE ORAL at 11:41

## 2017-07-29 RX ADMIN — Medication 1 MILLIGRAM(S): at 22:44

## 2017-07-29 RX ADMIN — PANTOPRAZOLE SODIUM 40 MILLIGRAM(S): 20 TABLET, DELAYED RELEASE ORAL at 06:24

## 2017-07-29 NOTE — PROGRESS NOTE ADULT - SUBJECTIVE AND OBJECTIVE BOX
INTERVAL HISTORY:  	  MEDICATIONS:  enoxaparin Injectable 40 milliGRAM(s) SubCutaneous every 24 hours  prazosin 1 milliGRAM(s) Oral at bedtime  furosemide    Tablet 20 milliGRAM(s) Oral daily    cephalexin 500 milliGRAM(s) Oral four times a day    ALBUTerol/ipratropium for Nebulization 3 milliLiter(s) Nebulizer every 6 hours PRN    QUEtiapine 400 milliGRAM(s) Oral at bedtime  topiramate 100 milliGRAM(s) Oral daily  acetaminophen   Tablet 650 milliGRAM(s) Oral every 6 hours PRN  naproxen 500 milliGRAM(s) Oral two times a day  gabapentin 800 milliGRAM(s) Oral three times a day  escitalopram 20 milliGRAM(s) Oral at bedtime  traZODone 300 milliGRAM(s) Oral at bedtime    pantoprazole    Tablet 40 milliGRAM(s) Oral before breakfast  senna 2 Tablet(s) Oral at bedtime  docusate sodium 100 milliGRAM(s) Oral two times a day      multivitamin 1 Tablet(s) Oral daily  cholecalciferol 2000 Unit(s) Oral daily  ferrous    sulfate Liquid 300 milliGRAM(s) Oral three times a day with meals      PHYSICAL EXAM:  T(C): 36.7 (07-29-17 @ 05:27), Max: 36.8 (07-28-17 @ 22:07)  HR: 79 (07-29-17 @ 05:27) (71 - 79)  BP: 114/77 (07-29-17 @ 05:27) (114/77 - 120/83)  RR: 18 (07-29-17 @ 05:27) (18 - 18)  SpO2: 96% (07-29-17 @ 05:27) (96% - 96%)  Wt(kg): --  I&O's Summary        Appearance: Normal	  HEENT:   Normal oral mucosa, PERRL, EOMI	  Cardiovascular: Normal S1 S2, No JVD, No murmurs, No edema  Respiratory: Lungs clear to auscultation	  Gastrointestinal:  Soft, Non-tender, + BS	  Extremities: Normal range of motion, No clubbing, cyanosis or edema                                    11.1   4.91  )-----------( 207      ( 29 Jul 2017 07:29 )             34.2     07-29    142  |  103  |  14  ----------------------------<  103<H>  3.7   |  27  |  0.70    Ca    9.1      29 Jul 2017 07:29      proBNP:   Lipid Profile:   HgA1c:   TSH: INTERVAL HISTORY: Pt seen in am lying in bed comfortable, not in distress, no chest pain no sOB  	  MEDICATIONS:  enoxaparin Injectable 40 milliGRAM(s) SubCutaneous every 24 hours  prazosin 1 milliGRAM(s) Oral at bedtime  furosemide    Tablet 20 milliGRAM(s) Oral daily    cephalexin 500 milliGRAM(s) Oral four times a day    ALBUTerol/ipratropium for Nebulization 3 milliLiter(s) Nebulizer every 6 hours PRN    QUEtiapine 400 milliGRAM(s) Oral at bedtime  topiramate 100 milliGRAM(s) Oral daily  acetaminophen   Tablet 650 milliGRAM(s) Oral every 6 hours PRN  naproxen 500 milliGRAM(s) Oral two times a day  gabapentin 800 milliGRAM(s) Oral three times a day  escitalopram 20 milliGRAM(s) Oral at bedtime  traZODone 300 milliGRAM(s) Oral at bedtime    pantoprazole    Tablet 40 milliGRAM(s) Oral before breakfast  senna 2 Tablet(s) Oral at bedtime  docusate sodium 100 milliGRAM(s) Oral two times a day      multivitamin 1 Tablet(s) Oral daily  cholecalciferol 2000 Unit(s) Oral daily  ferrous    sulfate Liquid 300 milliGRAM(s) Oral three times a day with meals      PHYSICAL EXAM:  T(C): 36.7 (07-29-17 @ 05:27), Max: 36.8 (07-28-17 @ 22:07)  HR: 79 (07-29-17 @ 05:27) (71 - 79)  BP: 114/77 (07-29-17 @ 05:27) (114/77 - 120/83)  RR: 18 (07-29-17 @ 05:27) (18 - 18)  SpO2: 96% (07-29-17 @ 05:27) (96% - 96%)  Wt(kg): --  I&O's Summary        Appearance: Normal	  HEENT:   Normal oral mucosa, PERRL, EOMI	  Cardiovascular: Normal S1 S2, No JVD, No murmurs, No edema  Respiratory: Lungs clear to auscultation	  Gastrointestinal:  Soft, Non-tender, + BS	  Extremities: +1 EDEMA B/L                                   11.1   4.91  )-----------( 207      ( 29 Jul 2017 07:29 )             34.2     07-29    142  |  103  |  14  ----------------------------<  103<H>  3.7   |  27  |  0.70    Ca    9.1      29 Jul 2017 07:29      proBNP:   Lipid Profile:   HgA1c:   TSH:

## 2017-07-29 NOTE — PROGRESS NOTE ADULT - SUBJECTIVE AND OBJECTIVE BOX
chief complaint : lower ext edema/ erythema of toe    SUBJECTIVE / OVERNIGHT EVENTS:  denies chest pain, shortness of breath, nausea, vomiting, wants to go home    MEDICATIONS  (STANDING):  nicotine - 21 mG/24Hr(s) Patch 1 patch Transdermal daily  pantoprazole    Tablet 40 milliGRAM(s) Oral before breakfast  senna 2 Tablet(s) Oral at bedtime  docusate sodium 100 milliGRAM(s) Oral two times a day  QUEtiapine 400 milliGRAM(s) Oral at bedtime  topiramate 100 milliGRAM(s) Oral daily  enoxaparin Injectable 40 milliGRAM(s) SubCutaneous every 24 hours  cephalexin 500 milliGRAM(s) Oral four times a day  prazosin 1 milliGRAM(s) Oral at bedtime  naproxen 500 milliGRAM(s) Oral two times a day  gabapentin 800 milliGRAM(s) Oral three times a day  escitalopram 20 milliGRAM(s) Oral at bedtime  traZODone 300 milliGRAM(s) Oral at bedtime  furosemide    Tablet 20 milliGRAM(s) Oral daily  multivitamin 1 Tablet(s) Oral daily  cholecalciferol 2000 Unit(s) Oral daily  ferrous    sulfate Liquid 300 milliGRAM(s) Oral three times a day with meals    MEDICATIONS  (PRN):  acetaminophen   Tablet 650 milliGRAM(s) Oral every 6 hours PRN For Temp greater than 38 C (100.4 F)  ALBUTerol/ipratropium for Nebulization 3 milliLiter(s) Nebulizer every 6 hours PRN Shortness of Breath and/or Wheezing    Vital Signs Last 24 Hrs  T(C): 36.7 (29 Jul 2017 13:48), Max: 36.8 (28 Jul 2017 22:07)  T(F): 98 (29 Jul 2017 13:48), Max: 98.3 (28 Jul 2017 22:07)  HR: 84 (29 Jul 2017 13:48) (71 - 84)  BP: 116/57 (29 Jul 2017 13:48) (114/77 - 120/83)  BP(mean): --  RR: 18 (29 Jul 2017 13:48) (18 - 18)  SpO2: 97% (29 Jul 2017 13:48) (96% - 97%)    Constitutional: No fever, fatigue  Skin: No rash.  Eyes: No recent vision problems or eye pain.  ENT: No congestion, ear pain, or sore throat.  Cardiovascular: No chest pain or palpation.  Respiratory: No cough, shortness of breath, congestion, or wheezing.  Gastrointestinal: No abdominal pain, nausea, vomiting, or diarrhea.  Genitourinary: No dysuria.  Musculoskeletal: No joint swelling.  Neurologic: No headache.    PHYSICAL EXAM:  GENERAL: NAD  EYES: EOMI, PERRLA  NECK: Supple, No JVD  CHEST/LUNG: dec breath sounds at bases  HEART:  S1 , S2 +  ABDOMEN: soft, bs+  EXTREMITIES: trace edema+   NEUROLOGY:alert awake oriented    LABS:  07-29    142  |  103  |  14  ----------------------------<  103<H>  3.7   |  27  |  0.70    Ca    9.1      29 Jul 2017 07:29      Creatinine Trend: 0.70 <--, 0.77 <--, 0.69 <--, 0.63 <--, 0.63 <--, 0.66 <--                        11.1   4.91  )-----------( 207      ( 29 Jul 2017 07:29 )             34.2     Urine Studies:

## 2017-07-29 NOTE — PROGRESS NOTE ADULT - ASSESSMENT
EKG - not in chart   Echo - Normal LV function     1) B/L lower extremity edema - CT abd shows possible left ovarian mass, echo shows Normal LV and RVfunction, gyn follow up    2) Cellulitis - on cephalexin    3) DVT prophylasix - on SC lovenox

## 2017-07-30 LAB
BUN SERPL-MCNC: 17 MG/DL — SIGNIFICANT CHANGE UP (ref 7–23)
CALCIUM SERPL-MCNC: 8.8 MG/DL — SIGNIFICANT CHANGE UP (ref 8.4–10.5)
CHLORIDE SERPL-SCNC: 103 MMOL/L — SIGNIFICANT CHANGE UP (ref 98–107)
CO2 SERPL-SCNC: 29 MMOL/L — SIGNIFICANT CHANGE UP (ref 22–31)
CREAT SERPL-MCNC: 0.76 MG/DL — SIGNIFICANT CHANGE UP (ref 0.5–1.3)
GLUCOSE SERPL-MCNC: 111 MG/DL — HIGH (ref 70–99)
HCT VFR BLD CALC: 34.6 % — SIGNIFICANT CHANGE UP (ref 34.5–45)
HGB BLD-MCNC: 11.2 G/DL — LOW (ref 11.5–15.5)
MCHC RBC-ENTMCNC: 28.4 PG — SIGNIFICANT CHANGE UP (ref 27–34)
MCHC RBC-ENTMCNC: 32.4 % — SIGNIFICANT CHANGE UP (ref 32–36)
MCV RBC AUTO: 87.8 FL — SIGNIFICANT CHANGE UP (ref 80–100)
NRBC # FLD: 0 — SIGNIFICANT CHANGE UP
PLATELET # BLD AUTO: 203 K/UL — SIGNIFICANT CHANGE UP (ref 150–400)
PMV BLD: 10.7 FL — SIGNIFICANT CHANGE UP (ref 7–13)
POTASSIUM SERPL-MCNC: 3.8 MMOL/L — SIGNIFICANT CHANGE UP (ref 3.5–5.3)
POTASSIUM SERPL-SCNC: 3.8 MMOL/L — SIGNIFICANT CHANGE UP (ref 3.5–5.3)
RBC # BLD: 3.94 M/UL — SIGNIFICANT CHANGE UP (ref 3.8–5.2)
RBC # FLD: 13.2 % — SIGNIFICANT CHANGE UP (ref 10.3–14.5)
SODIUM SERPL-SCNC: 143 MMOL/L — SIGNIFICANT CHANGE UP (ref 135–145)
WBC # BLD: 4.24 K/UL — SIGNIFICANT CHANGE UP (ref 3.8–10.5)
WBC # FLD AUTO: 4.24 K/UL — SIGNIFICANT CHANGE UP (ref 3.8–10.5)

## 2017-07-30 RX ORDER — BUPRENORPHINE AND NALOXONE 2; .5 MG/1; MG/1
4 TABLET SUBLINGUAL DAILY
Qty: 0 | Refills: 0 | Status: DISCONTINUED | OUTPATIENT
Start: 2017-07-30 | End: 2017-08-04

## 2017-07-30 RX ADMIN — Medication 300 MILLIGRAM(S): at 11:47

## 2017-07-30 RX ADMIN — Medication 100 MILLIGRAM(S): at 11:48

## 2017-07-30 RX ADMIN — Medication 500 MILLIGRAM(S): at 07:02

## 2017-07-30 RX ADMIN — Medication 500 MILLIGRAM(S): at 07:45

## 2017-07-30 RX ADMIN — GABAPENTIN 800 MILLIGRAM(S): 400 CAPSULE ORAL at 21:23

## 2017-07-30 RX ADMIN — GABAPENTIN 800 MILLIGRAM(S): 400 CAPSULE ORAL at 14:15

## 2017-07-30 RX ADMIN — Medication 1 PATCH: at 11:48

## 2017-07-30 RX ADMIN — Medication 1 PATCH: at 11:51

## 2017-07-30 RX ADMIN — Medication 100 MILLIGRAM(S): at 17:50

## 2017-07-30 RX ADMIN — PANTOPRAZOLE SODIUM 40 MILLIGRAM(S): 20 TABLET, DELAYED RELEASE ORAL at 07:02

## 2017-07-30 RX ADMIN — BUPRENORPHINE AND NALOXONE 4 TABLET(S): 2; .5 TABLET SUBLINGUAL at 13:20

## 2017-07-30 RX ADMIN — QUETIAPINE FUMARATE 400 MILLIGRAM(S): 200 TABLET, FILM COATED ORAL at 21:24

## 2017-07-30 RX ADMIN — Medication 300 MILLIGRAM(S): at 21:23

## 2017-07-30 RX ADMIN — Medication 100 MILLIGRAM(S): at 07:02

## 2017-07-30 RX ADMIN — Medication 20 MILLIGRAM(S): at 07:39

## 2017-07-30 RX ADMIN — Medication 500 MILLIGRAM(S): at 11:47

## 2017-07-30 RX ADMIN — GABAPENTIN 800 MILLIGRAM(S): 400 CAPSULE ORAL at 07:02

## 2017-07-30 RX ADMIN — Medication 300 MILLIGRAM(S): at 17:51

## 2017-07-30 RX ADMIN — Medication 2000 UNIT(S): at 11:47

## 2017-07-30 RX ADMIN — Medication 1 MILLIGRAM(S): at 21:24

## 2017-07-30 RX ADMIN — BUPRENORPHINE AND NALOXONE 4 TABLET(S): 2; .5 TABLET SUBLINGUAL at 12:28

## 2017-07-30 RX ADMIN — Medication 500 MILLIGRAM(S): at 17:50

## 2017-07-30 RX ADMIN — Medication 500 MILLIGRAM(S): at 07:39

## 2017-07-30 RX ADMIN — Medication 300 MILLIGRAM(S): at 08:43

## 2017-07-30 RX ADMIN — Medication 500 MILLIGRAM(S): at 18:29

## 2017-07-30 RX ADMIN — SENNA PLUS 2 TABLET(S): 8.6 TABLET ORAL at 21:23

## 2017-07-30 RX ADMIN — Medication 1 TABLET(S): at 11:47

## 2017-07-30 RX ADMIN — Medication 500 MILLIGRAM(S): at 23:53

## 2017-07-30 RX ADMIN — ESCITALOPRAM OXALATE 20 MILLIGRAM(S): 10 TABLET, FILM COATED ORAL at 21:23

## 2017-07-30 NOTE — PROGRESS NOTE ADULT - SUBJECTIVE AND OBJECTIVE BOX
INTERVAL HISTORY: pt seen in am lying in bed comfortable, not in distress  	  MEDICATIONS:  enoxaparin Injectable 40 milliGRAM(s) SubCutaneous every 24 hours  prazosin 1 milliGRAM(s) Oral at bedtime  furosemide    Tablet 20 milliGRAM(s) Oral daily    cephalexin 500 milliGRAM(s) Oral four times a day    ALBUTerol/ipratropium for Nebulization 3 milliLiter(s) Nebulizer every 6 hours PRN    QUEtiapine 400 milliGRAM(s) Oral at bedtime  topiramate 100 milliGRAM(s) Oral daily  acetaminophen   Tablet 650 milliGRAM(s) Oral every 6 hours PRN  naproxen 500 milliGRAM(s) Oral two times a day  gabapentin 800 milliGRAM(s) Oral three times a day  escitalopram 20 milliGRAM(s) Oral at bedtime  traZODone 300 milliGRAM(s) Oral at bedtime  buprenorphine 8 mG/naloxone 2 mG SL  Tablet 4 Tablet(s) SubLingual daily    pantoprazole    Tablet 40 milliGRAM(s) Oral before breakfast  senna 2 Tablet(s) Oral at bedtime  docusate sodium 100 milliGRAM(s) Oral two times a day      multivitamin 1 Tablet(s) Oral daily  cholecalciferol 2000 Unit(s) Oral daily  ferrous    sulfate Liquid 300 milliGRAM(s) Oral three times a day with meals      PHYSICAL EXAM:  T(C): 36.4 (07-30-17 @ 13:31), Max: 36.6 (07-29-17 @ 22:37)  HR: 87 (07-30-17 @ 13:31) (69 - 87)  BP: 114/75 (07-30-17 @ 13:31) (103/67 - 131/87)  RR: 18 (07-30-17 @ 13:31) (18 - 18)  SpO2: 97% (07-30-17 @ 13:31) (94% - 97%)  Wt(kg): --  I&O's Summary        Appearance: Normal	  HEENT:   Normal oral mucosa, PERRL, EOMI	  Cardiovascular: Normal S1 S2, No JVD, No murmurs, No edema  Respiratory: Lungs clear to auscultation	  Gastrointestinal:  Soft, Non-tender, + BS	  Extremities: 2+ edema                                    11.2   4.24  )-----------( 203      ( 30 Jul 2017 06:57 )             34.6     07-30    143  |  103  |  17  ----------------------------<  111<H>  3.8   |  29  |  0.76    Ca    8.8      30 Jul 2017 06:57      proBNP:   Lipid Profile:   HgA1c:   TSH:

## 2017-07-30 NOTE — PROGRESS NOTE ADULT - SUBJECTIVE AND OBJECTIVE BOX
chief complaint : lower ext edema/ erythema of toe    SUBJECTIVE / OVERNIGHT EVENTS:  denies chest pain, shortness of breath, nausea, vomiting, wants to go home    MEDICATIONS  (STANDING):  nicotine - 21 mG/24Hr(s) Patch 1 patch Transdermal daily  pantoprazole    Tablet 40 milliGRAM(s) Oral before breakfast  senna 2 Tablet(s) Oral at bedtime  docusate sodium 100 milliGRAM(s) Oral two times a day  QUEtiapine 400 milliGRAM(s) Oral at bedtime  topiramate 100 milliGRAM(s) Oral daily  enoxaparin Injectable 40 milliGRAM(s) SubCutaneous every 24 hours  cephalexin 500 milliGRAM(s) Oral four times a day  prazosin 1 milliGRAM(s) Oral at bedtime  naproxen 500 milliGRAM(s) Oral two times a day  gabapentin 800 milliGRAM(s) Oral three times a day  escitalopram 20 milliGRAM(s) Oral at bedtime  traZODone 300 milliGRAM(s) Oral at bedtime  furosemide    Tablet 20 milliGRAM(s) Oral daily  multivitamin 1 Tablet(s) Oral daily  cholecalciferol 2000 Unit(s) Oral daily  ferrous    sulfate Liquid 300 milliGRAM(s) Oral three times a day with meals  buprenorphine 8 mG/naloxone 2 mG SL  Tablet 4 Tablet(s) SubLingual daily    MEDICATIONS  (PRN):  acetaminophen   Tablet 650 milliGRAM(s) Oral every 6 hours PRN For Temp greater than 38 C (100.4 F)  ALBUTerol/ipratropium for Nebulization 3 milliLiter(s) Nebulizer every 6 hours PRN Shortness of Breath and/or Wheezing      Vital Signs Last 24 Hrs  T(C): 36.4 (30 Jul 2017 13:31), Max: 36.6 (29 Jul 2017 22:37)  T(F): 97.6 (30 Jul 2017 13:31), Max: 97.8 (29 Jul 2017 22:37)  HR: 87 (30 Jul 2017 13:31) (69 - 87)  BP: 114/75 (30 Jul 2017 13:31) (103/67 - 131/87)  BP(mean): --  RR: 18 (30 Jul 2017 13:31) (18 - 18)  SpO2: 97% (30 Jul 2017 13:31) (94% - 97%)    Constitutional: No fever, fatigue  Skin: No rash.  Eyes: No recent vision problems or eye pain.  ENT: No congestion, ear pain, or sore throat.  Cardiovascular: No chest pain or palpation.  Respiratory: No cough, shortness of breath, congestion, or wheezing.  Gastrointestinal: No abdominal pain, nausea, vomiting, or diarrhea.  Genitourinary: No dysuria.  Musculoskeletal: No joint swelling.  Neurologic: No headache.    PHYSICAL EXAM:  GENERAL: NAD  EYES: EOMI, PERRLA  NECK: Supple, No JVD  CHEST/LUNG: dec breath sounds at bases  HEART:  S1 , S2 +  ABDOMEN: soft, bs+  EXTREMITIES: trace edema+   NEUROLOGY:alert awake oriented    LABS:  07-30    143  |  103  |  17  ----------------------------<  111<H>  3.8   |  29  |  0.76    Ca    8.8      30 Jul 2017 06:57      Creatinine Trend: 0.76 <--, 0.70 <--, 0.77 <--, 0.69 <--, 0.63 <--, 0.63 <--                        11.2   4.24  )-----------( 203      ( 30 Jul 2017 06:57 )             34.6     Urine Studies:

## 2017-07-31 PROCEDURE — 99232 SBSQ HOSP IP/OBS MODERATE 35: CPT

## 2017-07-31 RX ORDER — FUROSEMIDE 40 MG
40 TABLET ORAL
Qty: 0 | Refills: 0 | Status: DISCONTINUED | OUTPATIENT
Start: 2017-07-31 | End: 2017-08-04

## 2017-07-31 RX ADMIN — Medication 1 PATCH: at 12:52

## 2017-07-31 RX ADMIN — SENNA PLUS 2 TABLET(S): 8.6 TABLET ORAL at 21:24

## 2017-07-31 RX ADMIN — Medication 500 MILLIGRAM(S): at 06:36

## 2017-07-31 RX ADMIN — Medication 500 MILLIGRAM(S): at 06:06

## 2017-07-31 RX ADMIN — Medication 20 MILLIGRAM(S): at 06:06

## 2017-07-31 RX ADMIN — Medication 500 MILLIGRAM(S): at 17:24

## 2017-07-31 RX ADMIN — GABAPENTIN 800 MILLIGRAM(S): 400 CAPSULE ORAL at 21:25

## 2017-07-31 RX ADMIN — Medication 300 MILLIGRAM(S): at 12:51

## 2017-07-31 RX ADMIN — Medication 40 MILLIGRAM(S): at 17:24

## 2017-07-31 RX ADMIN — Medication 2000 UNIT(S): at 12:51

## 2017-07-31 RX ADMIN — BUPRENORPHINE AND NALOXONE 4 TABLET(S): 2; .5 TABLET SUBLINGUAL at 13:45

## 2017-07-31 RX ADMIN — Medication 500 MILLIGRAM(S): at 12:53

## 2017-07-31 RX ADMIN — ENOXAPARIN SODIUM 40 MILLIGRAM(S): 100 INJECTION SUBCUTANEOUS at 12:54

## 2017-07-31 RX ADMIN — QUETIAPINE FUMARATE 400 MILLIGRAM(S): 200 TABLET, FILM COATED ORAL at 21:25

## 2017-07-31 RX ADMIN — Medication 1 PATCH: at 11:00

## 2017-07-31 RX ADMIN — Medication 100 MILLIGRAM(S): at 06:06

## 2017-07-31 RX ADMIN — BUPRENORPHINE AND NALOXONE 4 TABLET(S): 2; .5 TABLET SUBLINGUAL at 12:52

## 2017-07-31 RX ADMIN — GABAPENTIN 800 MILLIGRAM(S): 400 CAPSULE ORAL at 06:06

## 2017-07-31 RX ADMIN — Medication 300 MILLIGRAM(S): at 08:31

## 2017-07-31 RX ADMIN — ESCITALOPRAM OXALATE 20 MILLIGRAM(S): 10 TABLET, FILM COATED ORAL at 21:24

## 2017-07-31 RX ADMIN — Medication 100 MILLIGRAM(S): at 13:16

## 2017-07-31 RX ADMIN — Medication 1 TABLET(S): at 12:54

## 2017-07-31 RX ADMIN — PANTOPRAZOLE SODIUM 40 MILLIGRAM(S): 20 TABLET, DELAYED RELEASE ORAL at 06:06

## 2017-07-31 RX ADMIN — GABAPENTIN 800 MILLIGRAM(S): 400 CAPSULE ORAL at 12:52

## 2017-07-31 RX ADMIN — Medication 1 MILLIGRAM(S): at 21:25

## 2017-07-31 RX ADMIN — Medication 300 MILLIGRAM(S): at 17:24

## 2017-07-31 RX ADMIN — Medication 300 MILLIGRAM(S): at 21:24

## 2017-07-31 RX ADMIN — Medication 500 MILLIGRAM(S): at 18:15

## 2017-07-31 NOTE — PROGRESS NOTE ADULT - ASSESSMENT
EKG - not in chart   Echo - Normal LV function     1) B/L lower extremity edema - CT abd shows possible left ovarian mass, echo shows Normal LV and RVfunction, gyn follow up    2) Cellulitis - on cephalexin, ID follow up , not improving    3) DVT prophylasix - on SC lovenox

## 2017-07-31 NOTE — DIETITIAN INITIAL EVALUATION ADULT. - OTHER INFO
Patient seen for extended length of stay. Patient admitted for B/L LE edema and pain of 2 weeks. Patient reports she does not have a big appetite at baseline and it is dependent upon her mood. Reports she usually consumes starchy foods (pasta, potatoes, pizza, bagels) and tried to follow a vegetarian diet. Patient states she is allergic to raw fruits except bananas. Patient could not specify usual body weight but notes she has always been overweight. No GI distress (nausea/vomiting/diarrhea/constipation.) In-house patient reports her appetite is okay. Discussed vegetarian protein foods and small frequent meals with patient.

## 2017-07-31 NOTE — PROGRESS NOTE ADULT - SUBJECTIVE AND OBJECTIVE BOX
MILLER YARBROUGH 42y [id number]    Patient is a 42y old  Female who presents with a chief complaint of B/L LE edema and pain of 2 weeks in duration (28 Jul 2017 11:12)      Follow Up/CC:  cellulitis    Interval History/ROS:    Allergies    Alupent (Unknown)  ascorbic acid (Unknown)    Intolerances        ANTIMICROBIALS:  cephalexin 500 four times a day      MEDICATIONS  (STANDING):  nicotine - 21 mG/24Hr(s) Patch 1 patch Transdermal daily  pantoprazole    Tablet 40 milliGRAM(s) Oral before breakfast  senna 2 Tablet(s) Oral at bedtime  docusate sodium 100 milliGRAM(s) Oral two times a day  QUEtiapine 400 milliGRAM(s) Oral at bedtime  topiramate 100 milliGRAM(s) Oral daily  enoxaparin Injectable 40 milliGRAM(s) SubCutaneous every 24 hours  cephalexin 500 milliGRAM(s) Oral four times a day  prazosin 1 milliGRAM(s) Oral at bedtime  naproxen 500 milliGRAM(s) Oral two times a day  gabapentin 800 milliGRAM(s) Oral three times a day  escitalopram 20 milliGRAM(s) Oral at bedtime  traZODone 300 milliGRAM(s) Oral at bedtime  furosemide    Tablet 20 milliGRAM(s) Oral daily  multivitamin 1 Tablet(s) Oral daily  cholecalciferol 2000 Unit(s) Oral daily  ferrous    sulfate Liquid 300 milliGRAM(s) Oral three times a day with meals  buprenorphine 8 mG/naloxone 2 mG SL  Tablet 4 Tablet(s) SubLingual daily    MEDICATIONS  (PRN):  acetaminophen   Tablet 650 milliGRAM(s) Oral every 6 hours PRN For Temp greater than 38 C (100.4 F)  ALBUTerol/ipratropium for Nebulization 3 milliLiter(s) Nebulizer every 6 hours PRN Shortness of Breath and/or Wheezing        Vital Signs Last 24 Hrs  T(C): 36.4 (31 Jul 2017 06:01), Max: 36.4 (30 Jul 2017 13:31)  T(F): 97.6 (31 Jul 2017 06:01), Max: 97.6 (30 Jul 2017 13:31)  HR: 71 (31 Jul 2017 06:01) (71 - 87)  BP: 128/75 (31 Jul 2017 06:01) (109/76 - 128/75)  BP(mean): --  RR: 18 (31 Jul 2017 06:01) (18 - 18)  SpO2: 96% (31 Jul 2017 06:01) (96% - 98%)    CBC Full  -  ( 30 Jul 2017 06:57 )  WBC Count : 4.24 K/uL  Hemoglobin : 11.2 g/dL  Hematocrit : 34.6 %  Platelet Count - Automated : 203 K/uL  Mean Cell Volume : 87.8 fL  Mean Cell Hemoglobin : 28.4 pg  Mean Cell Hemoglobin Concentration : 32.4 %  Auto Neutrophil # : x  Auto Lymphocyte # : x  Auto Monocyte # : x  Auto Eosinophil # : x  Auto Basophil # : x  Auto Neutrophil % : x  Auto Lymphocyte % : x  Auto Monocyte % : x  Auto Eosinophil % : x  Auto Basophil % : x    07-30    143  |  103  |  17  ----------------------------<  111<H>  3.8   |  29  |  0.76    Ca    8.8      30 Jul 2017 06:57            MICROBIOLOGY:          v            RADIOLOGY    CXR:    CT HEAD:    CT CHEST:    CT ABDOMEN:    MRI:    OTHER: MILLER YARBROUGH 42y [id number]    Patient is a 42y old  Female who presents with a chief complaint of B/L LE edema and pain of 2 weeks in duration (28 Jul 2017 11:12)      Follow Up/CC:  cellulitis    Interval History/ROS: c/o left ankle pain    Allergies    Alupent (Unknown)  ascorbic acid (Unknown)    Intolerances        ANTIMICROBIALS:  cephalexin 500 four times a day      MEDICATIONS  (STANDING):  nicotine - 21 mG/24Hr(s) Patch 1 patch Transdermal daily  pantoprazole    Tablet 40 milliGRAM(s) Oral before breakfast  senna 2 Tablet(s) Oral at bedtime  docusate sodium 100 milliGRAM(s) Oral two times a day  QUEtiapine 400 milliGRAM(s) Oral at bedtime  topiramate 100 milliGRAM(s) Oral daily  enoxaparin Injectable 40 milliGRAM(s) SubCutaneous every 24 hours  cephalexin 500 milliGRAM(s) Oral four times a day  prazosin 1 milliGRAM(s) Oral at bedtime  naproxen 500 milliGRAM(s) Oral two times a day  gabapentin 800 milliGRAM(s) Oral three times a day  escitalopram 20 milliGRAM(s) Oral at bedtime  traZODone 300 milliGRAM(s) Oral at bedtime  furosemide    Tablet 20 milliGRAM(s) Oral daily  multivitamin 1 Tablet(s) Oral daily  cholecalciferol 2000 Unit(s) Oral daily  ferrous    sulfate Liquid 300 milliGRAM(s) Oral three times a day with meals  buprenorphine 8 mG/naloxone 2 mG SL  Tablet 4 Tablet(s) SubLingual daily    MEDICATIONS  (PRN):  acetaminophen   Tablet 650 milliGRAM(s) Oral every 6 hours PRN For Temp greater than 38 C (100.4 F)  ALBUTerol/ipratropium for Nebulization 3 milliLiter(s) Nebulizer every 6 hours PRN Shortness of Breath and/or Wheezing        Vital Signs Last 24 Hrs  T(C): 36.4 (31 Jul 2017 06:01), Max: 36.4 (30 Jul 2017 13:31)  T(F): 97.6 (31 Jul 2017 06:01), Max: 97.6 (30 Jul 2017 13:31)  HR: 71 (31 Jul 2017 06:01) (71 - 87)  BP: 128/75 (31 Jul 2017 06:01) (109/76 - 128/75)  BP(mean): --  RR: 18 (31 Jul 2017 06:01) (18 - 18)  SpO2: 96% (31 Jul 2017 06:01) (96% - 98%)    CBC Full  -  ( 30 Jul 2017 06:57 )  WBC Count : 4.24 K/uL  Hemoglobin : 11.2 g/dL  Hematocrit : 34.6 %  Platelet Count - Automated : 203 K/uL  Mean Cell Volume : 87.8 fL  Mean Cell Hemoglobin : 28.4 pg  Mean Cell Hemoglobin Concentration : 32.4 %  Auto Neutrophil # : x  Auto Lymphocyte # : x  Auto Monocyte # : x  Auto Eosinophil # : x  Auto Basophil # : x  Auto Neutrophil % : x  Auto Lymphocyte % : x  Auto Monocyte % : x  Auto Eosinophil % : x  Auto Basophil % : x    07-30    143  |  103  |  17  ----------------------------<  111<H>  3.8   |  29  |  0.76    Ca    8.8      30 Jul 2017 06:57            MICROBIOLOGY:          v            RADIOLOGY    CXR:    CT HEAD:    CT CHEST:    CT ABDOMEN:    MRI:    OTHER:

## 2017-07-31 NOTE — PROGRESS NOTE ADULT - ASSESSMENT
42 year old presented with bilateral leg swelling.  When I saw her last weekend,  her legs were not red or warm. she did say they were red before she recieved antibiotics- so the decision was made to give her a treatment course for group a strep cellulitis.    Today she states that her legs are still swollen and there is a slight redness to her right leg. She has been afebrile with a normal WBC.    It is unclear to me that this is cellulitis. My primary suspicion is that this is related to her edema and with dependent edema her legs become more swollen with a slight increase in redness.    I would consider noninfectious causes for her leg pain and swelling. She states she has not had prior episodes so RSD (reflex sympathetic dystrophy) seems less likely.       For the better sake of caution, we can extend her keflex for 5 more days to complete a 10 day course. 42 year old presented with bilateral leg swelling.  When I saw her last weekend,  her legs were not red or warm. she did say they were red before she recieved antibiotics- so the decision was made to give her a treatment course for group a strep cellulitis.

## 2017-07-31 NOTE — DIETITIAN INITIAL EVALUATION ADULT. - ENERGY NEEDS
Weight: 252.4 pounds (114.5 kg) (7/29)  Height: 65 inches  BMI: 42 kg/m^2  Ideal body weight: 125 pounds (56.8 kg) +/-10%

## 2017-07-31 NOTE — PROGRESS NOTE ADULT - SUBJECTIVE AND OBJECTIVE BOX
chief complaint : lower ext edema/ erythema of toe    SUBJECTIVE / OVERNIGHT EVENTS:  denies chest pain, shortness of breath, nausea, vomiting c/o worsening left ankle edema  MEDICATIONS  (STANDING):  nicotine - 21 mG/24Hr(s) Patch 1 patch Transdermal daily  pantoprazole    Tablet 40 milliGRAM(s) Oral before breakfast  senna 2 Tablet(s) Oral at bedtime  docusate sodium 100 milliGRAM(s) Oral two times a day  QUEtiapine 400 milliGRAM(s) Oral at bedtime  topiramate 100 milliGRAM(s) Oral daily  enoxaparin Injectable 40 milliGRAM(s) SubCutaneous every 24 hours  cephalexin 500 milliGRAM(s) Oral four times a day  prazosin 1 milliGRAM(s) Oral at bedtime  naproxen 500 milliGRAM(s) Oral two times a day  gabapentin 800 milliGRAM(s) Oral three times a day  escitalopram 20 milliGRAM(s) Oral at bedtime  traZODone 300 milliGRAM(s) Oral at bedtime  multivitamin 1 Tablet(s) Oral daily  cholecalciferol 2000 Unit(s) Oral daily  ferrous    sulfate Liquid 300 milliGRAM(s) Oral three times a day with meals  buprenorphine 8 mG/naloxone 2 mG SL  Tablet 4 Tablet(s) SubLingual daily  furosemide    Tablet 40 milliGRAM(s) Oral two times a day    MEDICATIONS  (PRN):  acetaminophen   Tablet 650 milliGRAM(s) Oral every 6 hours PRN For Temp greater than 38 C (100.4 F)  ALBUTerol/ipratropium for Nebulization 3 milliLiter(s) Nebulizer every 6 hours PRN Shortness of Breath and/or Wheezing      Vital Signs Last 24 Hrs  T(C): 36.5 (31 Jul 2017 14:35), Max: 36.5 (31 Jul 2017 14:35)  T(F): 97.7 (31 Jul 2017 14:35), Max: 97.7 (31 Jul 2017 14:35)  HR: 72 (31 Jul 2017 14:35) (71 - 85)  BP: 124/82 (31 Jul 2017 14:35) (109/76 - 128/75)  BP(mean): --  RR: 18 (31 Jul 2017 14:35) (18 - 18)  SpO2: 97% (31 Jul 2017 14:35) (96% - 98%)    Constitutional: No fever, fatigue  Skin: No rash.  Eyes: No recent vision problems or eye pain.  ENT: No congestion, ear pain, or sore throat.  Cardiovascular: No chest pain or palpation.  Respiratory: No cough, shortness of breath, congestion, or wheezing.  Gastrointestinal: No abdominal pain, nausea, vomiting, or diarrhea.  Genitourinary: No dysuria.  Musculoskeletal: No joint swelling.  Neurologic: No headache.    PHYSICAL EXAM:  GENERAL: NAD  EYES: EOMI, PERRLA  NECK: Supple, No JVD  CHEST/LUNG: dec breath sounds at bases  HEART:  S1 , S2 +  ABDOMEN: soft, bs+  EXTREMITIES: trace edema+   NEUROLOGY:alert awake oriented    LABS:  07-30    143  |  103  |  17  ----------------------------<  111<H>  3.8   |  29  |  0.76    Ca    8.8      30 Jul 2017 06:57      Creatinine Trend: 0.76 <--, 0.70 <--, 0.77 <--, 0.69 <--, 0.63 <--                        11.2   4.24  )-----------( 203      ( 30 Jul 2017 06:57 )             34.6     Urine Studies:

## 2017-08-01 LAB
BUN SERPL-MCNC: 16 MG/DL — SIGNIFICANT CHANGE UP (ref 7–23)
CALCIUM SERPL-MCNC: 8.8 MG/DL — SIGNIFICANT CHANGE UP (ref 8.4–10.5)
CHLORIDE SERPL-SCNC: 103 MMOL/L — SIGNIFICANT CHANGE UP (ref 98–107)
CO2 SERPL-SCNC: 30 MMOL/L — SIGNIFICANT CHANGE UP (ref 22–31)
CREAT SERPL-MCNC: 0.72 MG/DL — SIGNIFICANT CHANGE UP (ref 0.5–1.3)
CRP SERPL-MCNC: 6.1 MG/L — HIGH (ref 0.3–5)
ERYTHROCYTE [SEDIMENTATION RATE] IN BLOOD: 25 MM/HR — SIGNIFICANT CHANGE UP (ref 4–25)
GLUCOSE SERPL-MCNC: 110 MG/DL — HIGH (ref 70–99)
HCT VFR BLD CALC: 34.1 % — LOW (ref 34.5–45)
HGB BLD-MCNC: 11 G/DL — LOW (ref 11.5–15.5)
MCHC RBC-ENTMCNC: 28.4 PG — SIGNIFICANT CHANGE UP (ref 27–34)
MCHC RBC-ENTMCNC: 32.3 % — SIGNIFICANT CHANGE UP (ref 32–36)
MCV RBC AUTO: 88.1 FL — SIGNIFICANT CHANGE UP (ref 80–100)
NRBC # FLD: 0 — SIGNIFICANT CHANGE UP
PLATELET # BLD AUTO: 207 K/UL — SIGNIFICANT CHANGE UP (ref 150–400)
PMV BLD: 10.7 FL — SIGNIFICANT CHANGE UP (ref 7–13)
POTASSIUM SERPL-MCNC: 3.8 MMOL/L — SIGNIFICANT CHANGE UP (ref 3.5–5.3)
POTASSIUM SERPL-SCNC: 3.8 MMOL/L — SIGNIFICANT CHANGE UP (ref 3.5–5.3)
RBC # BLD: 3.87 M/UL — SIGNIFICANT CHANGE UP (ref 3.8–5.2)
RBC # FLD: 13.4 % — SIGNIFICANT CHANGE UP (ref 10.3–14.5)
SODIUM SERPL-SCNC: 144 MMOL/L — SIGNIFICANT CHANGE UP (ref 135–145)
WBC # BLD: 4.43 K/UL — SIGNIFICANT CHANGE UP (ref 3.8–10.5)
WBC # FLD AUTO: 4.43 K/UL — SIGNIFICANT CHANGE UP (ref 3.8–10.5)

## 2017-08-01 PROCEDURE — 74177 CT ABD & PELVIS W/CONTRAST: CPT | Mod: 26

## 2017-08-01 RX ADMIN — BUPRENORPHINE AND NALOXONE 4 TABLET(S): 2; .5 TABLET SUBLINGUAL at 11:59

## 2017-08-01 RX ADMIN — Medication 1 PATCH: at 12:01

## 2017-08-01 RX ADMIN — Medication 100 MILLIGRAM(S): at 11:59

## 2017-08-01 RX ADMIN — Medication 1 MILLIGRAM(S): at 22:47

## 2017-08-01 RX ADMIN — BUPRENORPHINE AND NALOXONE 4 TABLET(S): 2; .5 TABLET SUBLINGUAL at 12:45

## 2017-08-01 RX ADMIN — GABAPENTIN 800 MILLIGRAM(S): 400 CAPSULE ORAL at 05:49

## 2017-08-01 RX ADMIN — Medication 500 MILLIGRAM(S): at 18:00

## 2017-08-01 RX ADMIN — Medication 300 MILLIGRAM(S): at 08:50

## 2017-08-01 RX ADMIN — Medication 500 MILLIGRAM(S): at 05:49

## 2017-08-01 RX ADMIN — GABAPENTIN 800 MILLIGRAM(S): 400 CAPSULE ORAL at 13:36

## 2017-08-01 RX ADMIN — GABAPENTIN 800 MILLIGRAM(S): 400 CAPSULE ORAL at 22:47

## 2017-08-01 RX ADMIN — Medication 40 MILLIGRAM(S): at 17:13

## 2017-08-01 RX ADMIN — SENNA PLUS 2 TABLET(S): 8.6 TABLET ORAL at 22:48

## 2017-08-01 RX ADMIN — PANTOPRAZOLE SODIUM 40 MILLIGRAM(S): 20 TABLET, DELAYED RELEASE ORAL at 07:39

## 2017-08-01 RX ADMIN — Medication 40 MILLIGRAM(S): at 05:49

## 2017-08-01 RX ADMIN — Medication 500 MILLIGRAM(S): at 00:02

## 2017-08-01 RX ADMIN — Medication 300 MILLIGRAM(S): at 11:59

## 2017-08-01 RX ADMIN — Medication 500 MILLIGRAM(S): at 17:13

## 2017-08-01 RX ADMIN — Medication 500 MILLIGRAM(S): at 06:19

## 2017-08-01 RX ADMIN — Medication 300 MILLIGRAM(S): at 22:48

## 2017-08-01 RX ADMIN — Medication 100 MILLIGRAM(S): at 05:49

## 2017-08-01 RX ADMIN — Medication 500 MILLIGRAM(S): at 11:59

## 2017-08-01 RX ADMIN — Medication 2000 UNIT(S): at 11:59

## 2017-08-01 RX ADMIN — Medication 300 MILLIGRAM(S): at 17:13

## 2017-08-01 RX ADMIN — Medication 1 TABLET(S): at 12:01

## 2017-08-01 RX ADMIN — ESCITALOPRAM OXALATE 20 MILLIGRAM(S): 10 TABLET, FILM COATED ORAL at 22:47

## 2017-08-01 RX ADMIN — QUETIAPINE FUMARATE 400 MILLIGRAM(S): 200 TABLET, FILM COATED ORAL at 22:47

## 2017-08-01 NOTE — PROGRESS NOTE ADULT - SUBJECTIVE AND OBJECTIVE BOX
chief complaint : lower ext edema/ erythema of toe    SUBJECTIVE / OVERNIGHT EVENTS:  denies chest pain, shortness of breath was nauseous earlier, vominting +, pt says her reflux is getting worse because of her stress    MEDICATIONS  (STANDING):  nicotine - 21 mG/24Hr(s) Patch 1 patch Transdermal daily  pantoprazole    Tablet 40 milliGRAM(s) Oral before breakfast  senna 2 Tablet(s) Oral at bedtime  docusate sodium 100 milliGRAM(s) Oral two times a day  QUEtiapine 400 milliGRAM(s) Oral at bedtime  topiramate 100 milliGRAM(s) Oral daily  enoxaparin Injectable 40 milliGRAM(s) SubCutaneous every 24 hours  cephalexin 500 milliGRAM(s) Oral four times a day  prazosin 1 milliGRAM(s) Oral at bedtime  naproxen 500 milliGRAM(s) Oral two times a day  gabapentin 800 milliGRAM(s) Oral three times a day  escitalopram 20 milliGRAM(s) Oral at bedtime  traZODone 300 milliGRAM(s) Oral at bedtime  multivitamin 1 Tablet(s) Oral daily  cholecalciferol 2000 Unit(s) Oral daily  ferrous    sulfate Liquid 300 milliGRAM(s) Oral three times a day with meals  buprenorphine 8 mG/naloxone 2 mG SL  Tablet 4 Tablet(s) SubLingual daily  furosemide    Tablet 40 milliGRAM(s) Oral two times a day    MEDICATIONS  (PRN):  acetaminophen   Tablet 650 milliGRAM(s) Oral every 6 hours PRN For Temp greater than 38 C (100.4 F)  ALBUTerol/ipratropium for Nebulization 3 milliLiter(s) Nebulizer every 6 hours PRN Shortness of Breath and/or Wheezing      Vital Signs Last 24 Hrs  T(C): 36.4 (01 Aug 2017 22:46), Max: 36.7 (01 Aug 2017 05:45)  T(F): 97.6 (01 Aug 2017 22:46), Max: 98 (01 Aug 2017 05:45)  HR: 71 (01 Aug 2017 22:46) (68 - 76)  BP: 142/83 (01 Aug 2017 22:46) (120/70 - 142/83)  BP(mean): --  RR: 18 (01 Aug 2017 22:46) (18 - 18)  SpO2: 96% (01 Aug 2017 22:46) (95% - 96%)    Constitutional: No fever, fatigue  Skin: No rash.  Eyes: No recent vision problems or eye pain.  ENT: No congestion, ear pain, or sore throat.  Cardiovascular: No chest pain or palpation.  Respiratory: No cough, shortness of breath, congestion, or wheezing.  Gastrointestinal: No abdominal pain, nausea, vomiting, or diarrhea.  Genitourinary: No dysuria.  Musculoskeletal: No joint swelling.  Neurologic: No headache.    PHYSICAL EXAM:  GENERAL: NAD  EYES: EOMI, PERRLA  NECK: Supple, No JVD  CHEST/LUNG: dec breath sounds at bases  HEART:  S1 , S2 +  ABDOMEN: soft, bs+  EXTREMITIES: trace edema+   NEUROLOGY:alert awake oriented      LABS:  08-01    144  |  103  |  16  ----------------------------<  110<H>  3.8   |  30  |  0.72    Ca    8.8      01 Aug 2017 06:30      Creatinine Trend: 0.72 <--, 0.76 <--, 0.70 <--, 0.77 <--, 0.69 <--                        11.0   4.43  )-----------( 207      ( 01 Aug 2017 06:30 )             34.1     Urine Studies:

## 2017-08-01 NOTE — PROGRESS NOTE ADULT - SUBJECTIVE AND OBJECTIVE BOX
INTERVAL HISTORY: pt is lying in bed comfortable, not in distress, had an episode of vomiting, no cp no SOB  	  MEDICATIONS:  enoxaparin Injectable 40 milliGRAM(s) SubCutaneous every 24 hours  prazosin 1 milliGRAM(s) Oral at bedtime  furosemide    Tablet 40 milliGRAM(s) Oral two times a day    cephalexin 500 milliGRAM(s) Oral four times a day    ALBUTerol/ipratropium for Nebulization 3 milliLiter(s) Nebulizer every 6 hours PRN    QUEtiapine 400 milliGRAM(s) Oral at bedtime  topiramate 100 milliGRAM(s) Oral daily  acetaminophen   Tablet 650 milliGRAM(s) Oral every 6 hours PRN  naproxen 500 milliGRAM(s) Oral two times a day  gabapentin 800 milliGRAM(s) Oral three times a day  escitalopram 20 milliGRAM(s) Oral at bedtime  traZODone 300 milliGRAM(s) Oral at bedtime  buprenorphine 8 mG/naloxone 2 mG SL  Tablet 4 Tablet(s) SubLingual daily    pantoprazole    Tablet 40 milliGRAM(s) Oral before breakfast  senna 2 Tablet(s) Oral at bedtime  docusate sodium 100 milliGRAM(s) Oral two times a day      multivitamin 1 Tablet(s) Oral daily  cholecalciferol 2000 Unit(s) Oral daily  ferrous    sulfate Liquid 300 milliGRAM(s) Oral three times a day with meals      PHYSICAL EXAM:  T(C): 36.7 (08-01-17 @ 05:45), Max: 36.7 (08-01-17 @ 05:45)  HR: 68 (08-01-17 @ 05:45) (68 - 79)  BP: 120/70 (08-01-17 @ 05:45) (120/70 - 134/89)  RR: 18 (08-01-17 @ 05:45) (18 - 18)  SpO2: 96% (08-01-17 @ 05:45) (96% - 97%)  Wt(kg): --  I&O's Summary        Appearance: Normal	  HEENT:   Normal oral mucosa, PERRL, EOMI	  Cardiovascular: Normal S1 S2, No JVD, No murmurs, No edema  Respiratory: Lungs clear to auscultation	  Gastrointestinal:  Soft, Non-tender, + BS	  Extremities: 2+ edema                                    11.0   4.43  )-----------( 207      ( 01 Aug 2017 06:30 )             34.1     08-01    144  |  103  |  16  ----------------------------<  110<H>  3.8   |  30  |  0.72    Ca    8.8      01 Aug 2017 06:30      proBNP:   Lipid Profile:   HgA1c:   TSH:

## 2017-08-01 NOTE — PROGRESS NOTE ADULT - ASSESSMENT
EKG - not in chart   Echo - Normal LV function     1) B/L lower extremity edema - CT abd shows possible left ovarian mass, echo shows Normal LV and RVfunction, gyn follow up    2) Cellulitis - on cephalexin,    3) DVT prophylasix - on SC lovenox

## 2017-08-02 LAB
BUN SERPL-MCNC: 13 MG/DL — SIGNIFICANT CHANGE UP (ref 7–23)
CALCIUM SERPL-MCNC: 9.3 MG/DL — SIGNIFICANT CHANGE UP (ref 8.4–10.5)
CHLORIDE SERPL-SCNC: 100 MMOL/L — SIGNIFICANT CHANGE UP (ref 98–107)
CO2 SERPL-SCNC: 31 MMOL/L — SIGNIFICANT CHANGE UP (ref 22–31)
CREAT SERPL-MCNC: 0.77 MG/DL — SIGNIFICANT CHANGE UP (ref 0.5–1.3)
GLUCOSE SERPL-MCNC: 82 MG/DL — SIGNIFICANT CHANGE UP (ref 70–99)
HCT VFR BLD CALC: 35 % — SIGNIFICANT CHANGE UP (ref 34.5–45)
HGB BLD-MCNC: 11.3 G/DL — LOW (ref 11.5–15.5)
MCHC RBC-ENTMCNC: 28.5 PG — SIGNIFICANT CHANGE UP (ref 27–34)
MCHC RBC-ENTMCNC: 32.3 % — SIGNIFICANT CHANGE UP (ref 32–36)
MCV RBC AUTO: 88.2 FL — SIGNIFICANT CHANGE UP (ref 80–100)
NRBC # FLD: 0 — SIGNIFICANT CHANGE UP
PLATELET # BLD AUTO: 220 K/UL — SIGNIFICANT CHANGE UP (ref 150–400)
PMV BLD: 10.8 FL — SIGNIFICANT CHANGE UP (ref 7–13)
POTASSIUM SERPL-MCNC: 3.8 MMOL/L — SIGNIFICANT CHANGE UP (ref 3.5–5.3)
POTASSIUM SERPL-SCNC: 3.8 MMOL/L — SIGNIFICANT CHANGE UP (ref 3.5–5.3)
RBC # BLD: 3.97 M/UL — SIGNIFICANT CHANGE UP (ref 3.8–5.2)
RBC # FLD: 13.2 % — SIGNIFICANT CHANGE UP (ref 10.3–14.5)
SODIUM SERPL-SCNC: 144 MMOL/L — SIGNIFICANT CHANGE UP (ref 135–145)
WBC # BLD: 4.72 K/UL — SIGNIFICANT CHANGE UP (ref 3.8–10.5)
WBC # FLD AUTO: 4.72 K/UL — SIGNIFICANT CHANGE UP (ref 3.8–10.5)

## 2017-08-02 RX ORDER — LIDOCAINE 4 G/100G
1 CREAM TOPICAL DAILY
Qty: 0 | Refills: 0 | Status: DISCONTINUED | OUTPATIENT
Start: 2017-08-02 | End: 2017-08-04

## 2017-08-02 RX ORDER — KETOROLAC TROMETHAMINE 30 MG/ML
15 SYRINGE (ML) INJECTION ONCE
Qty: 0 | Refills: 0 | Status: DISCONTINUED | OUTPATIENT
Start: 2017-08-02 | End: 2017-08-02

## 2017-08-02 RX ADMIN — Medication 40 MILLIGRAM(S): at 17:12

## 2017-08-02 RX ADMIN — Medication 40 MILLIGRAM(S): at 05:54

## 2017-08-02 RX ADMIN — Medication 300 MILLIGRAM(S): at 09:14

## 2017-08-02 RX ADMIN — GABAPENTIN 800 MILLIGRAM(S): 400 CAPSULE ORAL at 15:06

## 2017-08-02 RX ADMIN — Medication 1 PATCH: at 12:03

## 2017-08-02 RX ADMIN — Medication 500 MILLIGRAM(S): at 17:13

## 2017-08-02 RX ADMIN — Medication 100 MILLIGRAM(S): at 12:00

## 2017-08-02 RX ADMIN — Medication 2000 UNIT(S): at 12:00

## 2017-08-02 RX ADMIN — Medication 300 MILLIGRAM(S): at 12:01

## 2017-08-02 RX ADMIN — Medication 500 MILLIGRAM(S): at 00:10

## 2017-08-02 RX ADMIN — Medication 500 MILLIGRAM(S): at 06:23

## 2017-08-02 RX ADMIN — Medication 1 PATCH: at 12:01

## 2017-08-02 RX ADMIN — Medication 15 MILLIGRAM(S): at 05:53

## 2017-08-02 RX ADMIN — Medication 100 MILLIGRAM(S): at 17:13

## 2017-08-02 RX ADMIN — Medication 500 MILLIGRAM(S): at 18:00

## 2017-08-02 RX ADMIN — Medication 100 MILLIGRAM(S): at 05:54

## 2017-08-02 RX ADMIN — Medication 300 MILLIGRAM(S): at 21:24

## 2017-08-02 RX ADMIN — GABAPENTIN 800 MILLIGRAM(S): 400 CAPSULE ORAL at 21:24

## 2017-08-02 RX ADMIN — Medication 1 TABLET(S): at 12:01

## 2017-08-02 RX ADMIN — Medication 500 MILLIGRAM(S): at 05:53

## 2017-08-02 RX ADMIN — Medication 15 MILLIGRAM(S): at 06:08

## 2017-08-02 RX ADMIN — Medication 300 MILLIGRAM(S): at 17:12

## 2017-08-02 RX ADMIN — GABAPENTIN 800 MILLIGRAM(S): 400 CAPSULE ORAL at 05:53

## 2017-08-02 RX ADMIN — QUETIAPINE FUMARATE 400 MILLIGRAM(S): 200 TABLET, FILM COATED ORAL at 21:23

## 2017-08-02 RX ADMIN — LIDOCAINE 1 PATCH: 4 CREAM TOPICAL at 16:35

## 2017-08-02 RX ADMIN — Medication 1 MILLIGRAM(S): at 21:24

## 2017-08-02 RX ADMIN — PANTOPRAZOLE SODIUM 40 MILLIGRAM(S): 20 TABLET, DELAYED RELEASE ORAL at 09:06

## 2017-08-02 RX ADMIN — BUPRENORPHINE AND NALOXONE 4 TABLET(S): 2; .5 TABLET SUBLINGUAL at 12:00

## 2017-08-02 RX ADMIN — ESCITALOPRAM OXALATE 20 MILLIGRAM(S): 10 TABLET, FILM COATED ORAL at 21:23

## 2017-08-02 NOTE — PROGRESS NOTE ADULT - SUBJECTIVE AND OBJECTIVE BOX
INTERVAL HISTORY:  	  MEDICATIONS:  enoxaparin Injectable 40 milliGRAM(s) SubCutaneous every 24 hours  prazosin 1 milliGRAM(s) Oral at bedtime  furosemide    Tablet 40 milliGRAM(s) Oral two times a day      ALBUTerol/ipratropium for Nebulization 3 milliLiter(s) Nebulizer every 6 hours PRN    QUEtiapine 400 milliGRAM(s) Oral at bedtime  topiramate 100 milliGRAM(s) Oral daily  acetaminophen   Tablet 650 milliGRAM(s) Oral every 6 hours PRN  naproxen 500 milliGRAM(s) Oral two times a day  gabapentin 800 milliGRAM(s) Oral three times a day  escitalopram 20 milliGRAM(s) Oral at bedtime  traZODone 300 milliGRAM(s) Oral at bedtime  buprenorphine 8 mG/naloxone 2 mG SL  Tablet 4 Tablet(s) SubLingual daily    pantoprazole    Tablet 40 milliGRAM(s) Oral before breakfast  senna 2 Tablet(s) Oral at bedtime  docusate sodium 100 milliGRAM(s) Oral two times a day      multivitamin 1 Tablet(s) Oral daily  cholecalciferol 2000 Unit(s) Oral daily  ferrous    sulfate Liquid 300 milliGRAM(s) Oral three times a day with meals      PHYSICAL EXAM:  T(C): 36.7 (08-02-17 @ 05:29), Max: 36.7 (08-01-17 @ 14:28)  HR: 80 (08-02-17 @ 05:29) (71 - 80)  BP: 122/86 (08-02-17 @ 05:29) (122/86 - 142/83)  RR: 18 (08-02-17 @ 05:29) (18 - 18)  SpO2: 94% (08-02-17 @ 05:29) (94% - 96%)  Wt(kg): --  I&O's Summary        Appearance: Normal	  HEENT:   Normal oral mucosa, PERRL, EOMI	  Cardiovascular: Normal S1 S2, No JVD, No murmurs, No edema  Respiratory: Lungs clear to auscultation	  Gastrointestinal:  Soft, Non-tender, + BS	  Extremities: Normal range of motion, No clubbing, cyanosis or edema                                    11.3   4.72  )-----------( 220      ( 02 Aug 2017 07:45 )             35.0     08-02    144  |  100  |  13  ----------------------------<  82  3.8   |  31  |  0.77    Ca    9.3      02 Aug 2017 07:45      proBNP:   Lipid Profile:   HgA1c:   TSH: INTERVAL HISTORY: pt seen in am comfortable not in dsitress  	  MEDICATIONS:  enoxaparin Injectable 40 milliGRAM(s) SubCutaneous every 24 hours  prazosin 1 milliGRAM(s) Oral at bedtime  furosemide    Tablet 40 milliGRAM(s) Oral two times a day      ALBUTerol/ipratropium for Nebulization 3 milliLiter(s) Nebulizer every 6 hours PRN    QUEtiapine 400 milliGRAM(s) Oral at bedtime  topiramate 100 milliGRAM(s) Oral daily  acetaminophen   Tablet 650 milliGRAM(s) Oral every 6 hours PRN  naproxen 500 milliGRAM(s) Oral two times a day  gabapentin 800 milliGRAM(s) Oral three times a day  escitalopram 20 milliGRAM(s) Oral at bedtime  traZODone 300 milliGRAM(s) Oral at bedtime  buprenorphine 8 mG/naloxone 2 mG SL  Tablet 4 Tablet(s) SubLingual daily    pantoprazole    Tablet 40 milliGRAM(s) Oral before breakfast  senna 2 Tablet(s) Oral at bedtime  docusate sodium 100 milliGRAM(s) Oral two times a day      multivitamin 1 Tablet(s) Oral daily  cholecalciferol 2000 Unit(s) Oral daily  ferrous    sulfate Liquid 300 milliGRAM(s) Oral three times a day with meals      PHYSICAL EXAM:  T(C): 36.7 (08-02-17 @ 05:29), Max: 36.7 (08-01-17 @ 14:28)  HR: 80 (08-02-17 @ 05:29) (71 - 80)  BP: 122/86 (08-02-17 @ 05:29) (122/86 - 142/83)  RR: 18 (08-02-17 @ 05:29) (18 - 18)  SpO2: 94% (08-02-17 @ 05:29) (94% - 96%)  Wt(kg): --  I&O's Summary        Appearance: Normal	  HEENT:   Normal oral mucosa, PERRL, EOMI	  Cardiovascular: Normal S1 S2, No JVD, No murmurs, No edema  Respiratory: Lungs clear to auscultation	  Gastrointestinal:  Soft, Non-tender, + BS	  Extremities: 2+ edema                                    11.3   4.72  )-----------( 220      ( 02 Aug 2017 07:45 )             35.0     08-02    144  |  100  |  13  ----------------------------<  82  3.8   |  31  |  0.77    Ca    9.3      02 Aug 2017 07:45      proBNP:   Lipid Profile:   HgA1c:   TSH:

## 2017-08-02 NOTE — PROGRESS NOTE ADULT - ASSESSMENT
EKG - not in chart   Echo - Normal LV function     1) B/L lower extremity edema - CT abd shows possible left ovarian mass, echo shows Normal LV and RVfunction, gyn follow up, on lasix    2) Cellulitis - s/p  cephalexin,    3) DVT prophylasix - on SC lovenox

## 2017-08-02 NOTE — PROGRESS NOTE ADULT - SUBJECTIVE AND OBJECTIVE BOX
chief complaint : lower ext edema/ erythema of toe    SUBJECTIVE / OVERNIGHT EVENTS:  denies chest pain, shortness of breath , nausea, vomiting, want to go back to facility   MEDICATIONS  (STANDING):  nicotine - 21 mG/24Hr(s) Patch 1 patch Transdermal daily  pantoprazole    Tablet 40 milliGRAM(s) Oral before breakfast  senna 2 Tablet(s) Oral at bedtime  docusate sodium 100 milliGRAM(s) Oral two times a day  QUEtiapine 400 milliGRAM(s) Oral at bedtime  topiramate 100 milliGRAM(s) Oral daily  enoxaparin Injectable 40 milliGRAM(s) SubCutaneous every 24 hours  prazosin 1 milliGRAM(s) Oral at bedtime  naproxen 500 milliGRAM(s) Oral two times a day  gabapentin 800 milliGRAM(s) Oral three times a day  escitalopram 20 milliGRAM(s) Oral at bedtime  traZODone 300 milliGRAM(s) Oral at bedtime  multivitamin 1 Tablet(s) Oral daily  cholecalciferol 2000 Unit(s) Oral daily  ferrous    sulfate Liquid 300 milliGRAM(s) Oral three times a day with meals  buprenorphine 8 mG/naloxone 2 mG SL  Tablet 4 Tablet(s) SubLingual daily  furosemide    Tablet 40 milliGRAM(s) Oral two times a day    MEDICATIONS  (PRN):  acetaminophen   Tablet 650 milliGRAM(s) Oral every 6 hours PRN For Temp greater than 38 C (100.4 F)  ALBUTerol/ipratropium for Nebulization 3 milliLiter(s) Nebulizer every 6 hours PRN Shortness of Breath and/or Wheezing      Vital Signs Last 24 Hrs  T(C): 36.5 (02 Aug 2017 13:10), Max: 36.7 (01 Aug 2017 14:28)  T(F): 97.7 (02 Aug 2017 13:10), Max: 98.1 (02 Aug 2017 05:29)  HR: 75 (02 Aug 2017 13:10) (71 - 80)  BP: 109/72 (02 Aug 2017 13:10) (109/72 - 142/83)  BP(mean): --  RR: 18 (02 Aug 2017 13:10) (18 - 18)  SpO2: 97% (02 Aug 2017 13:10) (94% - 97%)    Constitutional: No fever, fatigue  Skin: No rash.  Eyes: No recent vision problems or eye pain.  ENT: No congestion, ear pain, or sore throat.  Cardiovascular: No chest pain or palpation.  Respiratory: No cough, shortness of breath, congestion, or wheezing.  Gastrointestinal: No abdominal pain, nausea, vomiting, or diarrhea.  Genitourinary: No dysuria.  Musculoskeletal: No joint swelling.  Neurologic: No headache.    PHYSICAL EXAM:  GENERAL: NAD  EYES: EOMI, PERRLA  NECK: Supple, No JVD  CHEST/LUNG: dec breath sounds at bases  HEART:  S1 , S2 +  ABDOMEN: soft, bs+  EXTREMITIES: trace edema+   NEUROLOGY:alert awake oriented      LABS:  08-02    144  |  100  |  13  ----------------------------<  82  3.8   |  31  |  0.77    Ca    9.3      02 Aug 2017 07:45      Creatinine Trend: 0.77 <--, 0.72 <--, 0.76 <--, 0.70 <--, 0.77 <--                        11.3   4.72  )-----------( 220      ( 02 Aug 2017 07:45 )             35.0     Urine Studies:

## 2017-08-03 LAB
BUN SERPL-MCNC: 14 MG/DL — SIGNIFICANT CHANGE UP (ref 7–23)
CALCIUM SERPL-MCNC: 9.1 MG/DL — SIGNIFICANT CHANGE UP (ref 8.4–10.5)
CHLORIDE SERPL-SCNC: 100 MMOL/L — SIGNIFICANT CHANGE UP (ref 98–107)
CO2 SERPL-SCNC: 33 MMOL/L — HIGH (ref 22–31)
CREAT SERPL-MCNC: 0.78 MG/DL — SIGNIFICANT CHANGE UP (ref 0.5–1.3)
GLUCOSE SERPL-MCNC: 89 MG/DL — SIGNIFICANT CHANGE UP (ref 70–99)
HCT VFR BLD CALC: 33.3 % — LOW (ref 34.5–45)
HGB BLD-MCNC: 11 G/DL — LOW (ref 11.5–15.5)
MCHC RBC-ENTMCNC: 29 PG — SIGNIFICANT CHANGE UP (ref 27–34)
MCHC RBC-ENTMCNC: 33 % — SIGNIFICANT CHANGE UP (ref 32–36)
MCV RBC AUTO: 87.9 FL — SIGNIFICANT CHANGE UP (ref 80–100)
NRBC # FLD: 0 — SIGNIFICANT CHANGE UP
PLATELET # BLD AUTO: 213 K/UL — SIGNIFICANT CHANGE UP (ref 150–400)
PMV BLD: 10.7 FL — SIGNIFICANT CHANGE UP (ref 7–13)
POTASSIUM SERPL-MCNC: 3.7 MMOL/L — SIGNIFICANT CHANGE UP (ref 3.5–5.3)
POTASSIUM SERPL-SCNC: 3.7 MMOL/L — SIGNIFICANT CHANGE UP (ref 3.5–5.3)
RBC # BLD: 3.79 M/UL — LOW (ref 3.8–5.2)
RBC # FLD: 13.2 % — SIGNIFICANT CHANGE UP (ref 10.3–14.5)
SODIUM SERPL-SCNC: 144 MMOL/L — SIGNIFICANT CHANGE UP (ref 135–145)
WBC # BLD: 4.58 K/UL — SIGNIFICANT CHANGE UP (ref 3.8–10.5)
WBC # FLD AUTO: 4.58 K/UL — SIGNIFICANT CHANGE UP (ref 3.8–10.5)

## 2017-08-03 RX ORDER — HYDROXYZINE HCL 10 MG
50 TABLET ORAL ONCE
Qty: 0 | Refills: 0 | Status: COMPLETED | OUTPATIENT
Start: 2017-08-03 | End: 2017-08-03

## 2017-08-03 RX ADMIN — Medication 500 MILLIGRAM(S): at 06:45

## 2017-08-03 RX ADMIN — GABAPENTIN 800 MILLIGRAM(S): 400 CAPSULE ORAL at 21:59

## 2017-08-03 RX ADMIN — QUETIAPINE FUMARATE 400 MILLIGRAM(S): 200 TABLET, FILM COATED ORAL at 22:02

## 2017-08-03 RX ADMIN — Medication 1 TABLET(S): at 11:49

## 2017-08-03 RX ADMIN — GABAPENTIN 800 MILLIGRAM(S): 400 CAPSULE ORAL at 13:50

## 2017-08-03 RX ADMIN — Medication 2000 UNIT(S): at 11:48

## 2017-08-03 RX ADMIN — Medication 300 MILLIGRAM(S): at 10:31

## 2017-08-03 RX ADMIN — Medication 1 PATCH: at 11:47

## 2017-08-03 RX ADMIN — Medication 40 MILLIGRAM(S): at 17:02

## 2017-08-03 RX ADMIN — Medication 300 MILLIGRAM(S): at 11:47

## 2017-08-03 RX ADMIN — PANTOPRAZOLE SODIUM 40 MILLIGRAM(S): 20 TABLET, DELAYED RELEASE ORAL at 06:30

## 2017-08-03 RX ADMIN — Medication 500 MILLIGRAM(S): at 06:15

## 2017-08-03 RX ADMIN — Medication 1 PATCH: at 11:55

## 2017-08-03 RX ADMIN — Medication 100 MILLIGRAM(S): at 13:49

## 2017-08-03 RX ADMIN — Medication 1 MILLIGRAM(S): at 21:59

## 2017-08-03 RX ADMIN — LIDOCAINE 1 PATCH: 4 CREAM TOPICAL at 23:20

## 2017-08-03 RX ADMIN — Medication 500 MILLIGRAM(S): at 17:01

## 2017-08-03 RX ADMIN — BUPRENORPHINE AND NALOXONE 4 TABLET(S): 2; .5 TABLET SUBLINGUAL at 11:47

## 2017-08-03 RX ADMIN — BUPRENORPHINE AND NALOXONE 4 TABLET(S): 2; .5 TABLET SUBLINGUAL at 12:47

## 2017-08-03 RX ADMIN — Medication 50 MILLIGRAM(S): at 16:52

## 2017-08-03 RX ADMIN — LIDOCAINE 1 PATCH: 4 CREAM TOPICAL at 04:35

## 2017-08-03 RX ADMIN — SENNA PLUS 2 TABLET(S): 8.6 TABLET ORAL at 22:00

## 2017-08-03 RX ADMIN — ESCITALOPRAM OXALATE 20 MILLIGRAM(S): 10 TABLET, FILM COATED ORAL at 22:00

## 2017-08-03 RX ADMIN — Medication 40 MILLIGRAM(S): at 06:15

## 2017-08-03 RX ADMIN — Medication 300 MILLIGRAM(S): at 22:00

## 2017-08-03 RX ADMIN — Medication 300 MILLIGRAM(S): at 17:01

## 2017-08-03 RX ADMIN — LIDOCAINE 1 PATCH: 4 CREAM TOPICAL at 11:47

## 2017-08-03 RX ADMIN — Medication 100 MILLIGRAM(S): at 17:01

## 2017-08-03 RX ADMIN — GABAPENTIN 800 MILLIGRAM(S): 400 CAPSULE ORAL at 06:15

## 2017-08-03 NOTE — PROGRESS NOTE ADULT - PROBLEM SELECTOR PLAN 1
incidental finding of adnexal mass on ct - MRI / Gyn evaluated pt - further w/u as out pt    pt had episode of n/ vomiting - abd ct to r/o abd pathology given adnexal mass, benign abd exam
as per ID KEFLEX PO  ct venogram as per vascular  pt says she has been having lower ext edema past few months was started on lasix recently - cards eval
incidental finding of adnexal mass on ct - MRI / Gyn eval
incidental finding of adnexal mass on ct - MRI / Gyn eval
incidental finding of adnexal mass on ct - MRI / Gyn evaluated pt - further w/u as out pt
incidental finding of adnexal mass on ct - MRI / Gyn evaluated pt - further w/u as out pt    pt had episode of n/ vomiting yesterday, abd ct neg for acute abd pathology - stable adnexal mass+
incidental finding of adnexal mass on ct - MRI / Gyn evaluated pt - further w/u as out pt    pt had episode of n/ vomiting yesterday, abd ct neg for acute abd pathology - stable adnexal mass+
as per ID KEFLEX PO
-no significant cellulitis  -no fever  -swelling/pain+   -I am concerned about a component of RSD - her pain has been going on for weeks  -doubt additional abx needed  -complete Keflex as scheduled

## 2017-08-03 NOTE — PROGRESS NOTE ADULT - SUBJECTIVE AND OBJECTIVE BOX
INTERVAL HISTORY: pt is lying in bed comfortable, not in distress  	  MEDICATIONS:  enoxaparin Injectable 40 milliGRAM(s) SubCutaneous every 24 hours  prazosin 1 milliGRAM(s) Oral at bedtime  furosemide    Tablet 40 milliGRAM(s) Oral two times a day      ALBUTerol/ipratropium for Nebulization 3 milliLiter(s) Nebulizer every 6 hours PRN    QUEtiapine 400 milliGRAM(s) Oral at bedtime  topiramate 100 milliGRAM(s) Oral daily  acetaminophen   Tablet 650 milliGRAM(s) Oral every 6 hours PRN  naproxen 500 milliGRAM(s) Oral two times a day  gabapentin 800 milliGRAM(s) Oral three times a day  escitalopram 20 milliGRAM(s) Oral at bedtime  traZODone 300 milliGRAM(s) Oral at bedtime  buprenorphine 8 mG/naloxone 2 mG SL  Tablet 4 Tablet(s) SubLingual daily    pantoprazole    Tablet 40 milliGRAM(s) Oral before breakfast  senna 2 Tablet(s) Oral at bedtime  docusate sodium 100 milliGRAM(s) Oral two times a day      multivitamin 1 Tablet(s) Oral daily  cholecalciferol 2000 Unit(s) Oral daily  ferrous    sulfate Liquid 300 milliGRAM(s) Oral three times a day with meals  lidocaine   Patch 1 Patch Transdermal daily      PHYSICAL EXAM:  T(C): 36.7 (08-03-17 @ 14:22), Max: 36.8 (08-03-17 @ 06:15)  HR: 70 (08-03-17 @ 17:06) (62 - 74)  BP: 108/71 (08-03-17 @ 17:06) (108/71 - 125/73)  RR: 18 (08-03-17 @ 14:22) (18 - 18)  SpO2: 96% (08-03-17 @ 14:22) (95% - 96%)  Wt(kg): --  I&O's Summary    02 Aug 2017 07:01  -  03 Aug 2017 07:00  --------------------------------------------------------  IN: 540 mL / OUT: 0 mL / NET: 540 mL          Appearance: Normal	  HEENT:   Normal oral mucosa, PERRL, EOMI	  Cardiovascular: Normal S1 S2, No JVD, No murmurs, No edema  Respiratory: Lungs clear to auscultation	  Gastrointestinal:  Soft, Non-tender, + BS	  Extremities: 2+ edema                                    11.0   4.58  )-----------( 213      ( 03 Aug 2017 06:50 )             33.3     08-03    144  |  100  |  14  ----------------------------<  89  3.7   |  33<H>  |  0.78    Ca    9.1      03 Aug 2017 06:50      proBNP:   Lipid Profile:   HgA1c:   TSH:

## 2017-08-03 NOTE — PROGRESS NOTE ADULT - PROBLEM SELECTOR PROBLEM 1
Adnexal mass
Cellulitis of lower extremity, unspecified laterality

## 2017-08-03 NOTE — PROGRESS NOTE ADULT - ATTENDING COMMENTS
pt medically optimized for discharge, no acute medical interventions at present, stable to go back to facility
pt medically optimized for discharge, no acute medical interventions at present, stable to go back to facility
Eduardo Epstein  Attending Physician   Division of Infectious Disease  Pager #[phone]  After 5pm/weekend #[phone]

## 2017-08-03 NOTE — PROGRESS NOTE ADULT - PROBLEM SELECTOR PROBLEM 3
Borderline personality disorder
Substance abuse
Substance abuse

## 2017-08-03 NOTE — PROGRESS NOTE ADULT - SUBJECTIVE AND OBJECTIVE BOX
chief complaint : lower ext edema/ erythema of toe    SUBJECTIVE / OVERNIGHT EVENTS:  denies chest pain, shortness of breath , nausea, vomiting, want to go back to facility       MEDICATIONS  (STANDING):  nicotine - 21 mG/24Hr(s) Patch 1 patch Transdermal daily  pantoprazole    Tablet 40 milliGRAM(s) Oral before breakfast  senna 2 Tablet(s) Oral at bedtime  docusate sodium 100 milliGRAM(s) Oral two times a day  QUEtiapine 400 milliGRAM(s) Oral at bedtime  topiramate 100 milliGRAM(s) Oral daily  enoxaparin Injectable 40 milliGRAM(s) SubCutaneous every 24 hours  prazosin 1 milliGRAM(s) Oral at bedtime  naproxen 500 milliGRAM(s) Oral two times a day  gabapentin 800 milliGRAM(s) Oral three times a day  escitalopram 20 milliGRAM(s) Oral at bedtime  traZODone 300 milliGRAM(s) Oral at bedtime  multivitamin 1 Tablet(s) Oral daily  cholecalciferol 2000 Unit(s) Oral daily  ferrous    sulfate Liquid 300 milliGRAM(s) Oral three times a day with meals  buprenorphine 8 mG/naloxone 2 mG SL  Tablet 4 Tablet(s) SubLingual daily  furosemide    Tablet 40 milliGRAM(s) Oral two times a day  lidocaine   Patch 1 Patch Transdermal daily    MEDICATIONS  (PRN):  acetaminophen   Tablet 650 milliGRAM(s) Oral every 6 hours PRN For Temp greater than 38 C (100.4 F)  ALBUTerol/ipratropium for Nebulization 3 milliLiter(s) Nebulizer every 6 hours PRN Shortness of Breath and/or Wheezing      Vital Signs Last 24 Hrs  T(C): 36.8 (03 Aug 2017 21:55), Max: 36.8 (03 Aug 2017 06:15)  T(F): 98.3 (03 Aug 2017 21:55), Max: 98.3 (03 Aug 2017 21:55)  HR: 68 (03 Aug 2017 21:55) (62 - 74)  BP: 118/70 (03 Aug 2017 21:55) (108/71 - 118/70)  BP(mean): --  RR: 18 (03 Aug 2017 21:55) (18 - 18)  SpO2: 97% (03 Aug 2017 21:55) (95% - 97%)    Constitutional: No fever, fatigue  Skin: No rash.  Eyes: No recent vision problems or eye pain.  ENT: No congestion, ear pain, or sore throat.  Cardiovascular: No chest pain or palpation.  Respiratory: No cough, shortness of breath, congestion, or wheezing.  Gastrointestinal: No abdominal pain, nausea, vomiting, or diarrhea.  Genitourinary: No dysuria.  Musculoskeletal: No joint swelling.  Neurologic: No headache.    PHYSICAL EXAM:  GENERAL: NAD  EYES: EOMI, PERRLA  NECK: Supple, No JVD  CHEST/LUNG: dec breath sounds at bases  HEART:  S1 , S2 +  ABDOMEN: soft, bs+  EXTREMITIES: trace edema+   NEUROLOGY:alert awake oriented    LABS:  08-03    144  |  100  |  14  ----------------------------<  89  3.7   |  33<H>  |  0.78    Ca    9.1      03 Aug 2017 06:50      Creatinine Trend: 0.78 <--, 0.77 <--, 0.72 <--, 0.76 <--, 0.70 <--, 0.77 <--                        11.0   4.58  )-----------( 213      ( 03 Aug 2017 06:50 )             33.3     Urine Studies:

## 2017-08-03 NOTE — PROGRESS NOTE ADULT - PROBLEM SELECTOR PROBLEM 2
Cellulitis of lower extremity, unspecified laterality
Borderline personality disorder
Cellulitis of lower extremity, unspecified laterality
Borderline personality disorder

## 2017-08-03 NOTE — PROGRESS NOTE ADULT - PROBLEM SELECTOR PROBLEM 4
Substance abuse

## 2017-08-03 NOTE — PROGRESS NOTE ADULT - PROBLEM SELECTOR PLAN 4
sub axone as per psych   had extensive lengthy discussion with pt about pts current clinical status and management plan, all questions answered

## 2017-08-03 NOTE — PROGRESS NOTE ADULT - PROBLEM SELECTOR PLAN 3
Cont current psych meds
sub axone as per psych   had extensive lenghy discussion with pt about pts current clinical status aand management plan, all questions answered
sub axone as per psych   had extensive lenghy discussion with pt about pts current clinical status aand management plan, all questions answered

## 2017-08-03 NOTE — PROGRESS NOTE ADULT - PROBLEM SELECTOR PLAN 2
as per ID s/p  KEFLEx , no further need for abx  as per vascular - no acute vascular intervention   pt says she has been having lower ext edema past few months was started on lasix recently  RSD - will obtain neuro eval
Cont current psych meds
as per ID KEFLEX PO  ct venogram as per vascular  pt says she has been having lower ext edema past few months was started on lasix recently - cards eval
as per ID s/p  KEFLEx , no further need for abx  as per vascular - no acute vascular intervention   pt says she has been having lower ext edema past few months was started on lasix recently  RSD - will obtain neuro eval
as per ID s/p  KEFLEx , no further need for abx  as per vascular - no acute vascular intervention   pt says she has been having lower ext edema past few months was started on lasix recently - evaluated by cards - no dec lvfx - cont lasix   as per ID no need for abx at present
as per ID s/p  KEFLEx , no further need for abx  as per vascular - no acute vascular intervention   pt says she has been having lower ext edema past few months was started on lasix recently - evaluated by cards - no dec lvfx - cont lasix   as per ID no need for abx at present
Cont current psych meds

## 2017-08-04 VITALS
HEART RATE: 77 BPM | OXYGEN SATURATION: 98 % | SYSTOLIC BLOOD PRESSURE: 112 MMHG | DIASTOLIC BLOOD PRESSURE: 73 MMHG | RESPIRATION RATE: 18 BRPM | TEMPERATURE: 98 F

## 2017-08-04 RX ORDER — LIDOCAINE 4 G/100G
1 CREAM TOPICAL
Qty: 30 | Refills: 0 | OUTPATIENT
Start: 2017-08-04 | End: 2017-09-03

## 2017-08-04 RX ADMIN — Medication 100 MILLIGRAM(S): at 12:09

## 2017-08-04 RX ADMIN — Medication 500 MILLIGRAM(S): at 06:30

## 2017-08-04 RX ADMIN — Medication 2000 UNIT(S): at 12:08

## 2017-08-04 RX ADMIN — Medication 1 TABLET(S): at 12:08

## 2017-08-04 RX ADMIN — Medication 300 MILLIGRAM(S): at 09:26

## 2017-08-04 RX ADMIN — PANTOPRAZOLE SODIUM 40 MILLIGRAM(S): 20 TABLET, DELAYED RELEASE ORAL at 05:49

## 2017-08-04 RX ADMIN — Medication 300 MILLIGRAM(S): at 12:08

## 2017-08-04 RX ADMIN — GABAPENTIN 800 MILLIGRAM(S): 400 CAPSULE ORAL at 05:49

## 2017-08-04 RX ADMIN — BUPRENORPHINE AND NALOXONE 4 TABLET(S): 2; .5 TABLET SUBLINGUAL at 12:06

## 2017-08-04 RX ADMIN — Medication 500 MILLIGRAM(S): at 05:49

## 2017-08-04 RX ADMIN — LIDOCAINE 1 PATCH: 4 CREAM TOPICAL at 12:07

## 2017-08-04 RX ADMIN — Medication 100 MILLIGRAM(S): at 06:00

## 2017-08-04 RX ADMIN — Medication 40 MILLIGRAM(S): at 05:49

## 2017-08-04 RX ADMIN — GABAPENTIN 800 MILLIGRAM(S): 400 CAPSULE ORAL at 14:21

## 2017-08-04 NOTE — PROGRESS NOTE ADULT - ASSESSMENT
EKG - not in chart   Echo - Normal LV function     1) B/L lower extremity edema - CT abd shows possible left ovarian mass, echo shows Normal LV and RVfunction, gyn follow up OUT PATIENT    2) Cellulitis - s/p  cephalexin,    3) DVT prophylasix - on SC lovenox

## 2017-08-04 NOTE — PROGRESS NOTE ADULT - SUBJECTIVE AND OBJECTIVE BOX
INTERVAL HISTORY: pt seen in am comfortable, not in distress, no chest pain noSOB  	  MEDICATIONS:  enoxaparin Injectable 40 milliGRAM(s) SubCutaneous every 24 hours  prazosin 1 milliGRAM(s) Oral at bedtime  furosemide    Tablet 40 milliGRAM(s) Oral two times a day      ALBUTerol/ipratropium for Nebulization 3 milliLiter(s) Nebulizer every 6 hours PRN    QUEtiapine 400 milliGRAM(s) Oral at bedtime  topiramate 100 milliGRAM(s) Oral daily  acetaminophen   Tablet 650 milliGRAM(s) Oral every 6 hours PRN  naproxen 500 milliGRAM(s) Oral two times a day  gabapentin 800 milliGRAM(s) Oral three times a day  escitalopram 20 milliGRAM(s) Oral at bedtime  traZODone 300 milliGRAM(s) Oral at bedtime  buprenorphine 8 mG/naloxone 2 mG SL  Tablet 4 Tablet(s) SubLingual daily    pantoprazole    Tablet 40 milliGRAM(s) Oral before breakfast  senna 2 Tablet(s) Oral at bedtime  docusate sodium 100 milliGRAM(s) Oral two times a day      multivitamin 1 Tablet(s) Oral daily  cholecalciferol 2000 Unit(s) Oral daily  ferrous    sulfate Liquid 300 milliGRAM(s) Oral three times a day with meals  lidocaine   Patch 1 Patch Transdermal daily      PHYSICAL EXAM:  T(C): 36.7 (08-04-17 @ 12:05), Max: 36.9 (08-04-17 @ 05:48)  HR: 77 (08-04-17 @ 12:05) (61 - 77)  BP: 112/73 (08-04-17 @ 12:05) (105/61 - 118/70)  RR: 18 (08-04-17 @ 12:05) (18 - 18)  SpO2: 98% (08-04-17 @ 12:05) (97% - 98%)  Wt(kg): --  I&O's Summary        Appearance: Normal	  HEENT:   Normal oral mucosa, PERRL, EOMI	  Cardiovascular: Normal S1 S2, No JVD, No murmurs, No edema  Respiratory: Lungs clear to auscultation	  Gastrointestinal:  Soft, Non-tender, + BS	  Extremities: 2+ EDEMA                                  11.0   4.58  )-----------( 213      ( 03 Aug 2017 06:50 )             33.3     08-03    144  |  100  |  14  ----------------------------<  89  3.7   |  33<H>  |  0.78    Ca    9.1      03 Aug 2017 06:50      proBNP:   Lipid Profile:   HgA1c:   TSH:

## 2017-08-04 NOTE — PROGRESS NOTE ADULT - PROVIDER SPECIALTY LIST ADULT
Cardiology
Gyn Onc
Infectious Disease
Infectious Disease
Internal Medicine
Vascular Surgery
Internal Medicine

## 2017-08-18 ENCOUNTER — APPOINTMENT (OUTPATIENT)
Dept: GYNECOLOGIC ONCOLOGY | Facility: CLINIC | Age: 42
End: 2017-08-18
Payer: MEDICAID

## 2017-08-18 ENCOUNTER — APPOINTMENT (OUTPATIENT)
Dept: GYNECOLOGIC ONCOLOGY | Facility: CLINIC | Age: 42
End: 2017-08-18

## 2017-08-18 VITALS
WEIGHT: 220 LBS | DIASTOLIC BLOOD PRESSURE: 80 MMHG | BODY MASS INDEX: 37.56 KG/M2 | HEIGHT: 64 IN | SYSTOLIC BLOOD PRESSURE: 120 MMHG

## 2017-08-18 DIAGNOSIS — F11.21 OPIOID DEPENDENCE, IN REMISSION: ICD-10-CM

## 2017-08-18 DIAGNOSIS — Z86.69 PERSONAL HISTORY OF OTHER DISEASES OF THE NERVOUS SYSTEM AND SENSE ORGANS: ICD-10-CM

## 2017-08-18 DIAGNOSIS — Z86.59 PERSONAL HISTORY OF OTHER MENTAL AND BEHAVIORAL DISORDERS: ICD-10-CM

## 2017-08-18 DIAGNOSIS — Z72.0 TOBACCO USE: ICD-10-CM

## 2017-08-18 DIAGNOSIS — Z87.09 PERSONAL HISTORY OF OTHER DISEASES OF THE RESPIRATORY SYSTEM: ICD-10-CM

## 2017-08-18 PROCEDURE — 99204 OFFICE O/P NEW MOD 45 MIN: CPT

## 2017-08-24 ENCOUNTER — OTHER (OUTPATIENT)
Age: 42
End: 2017-08-24

## 2017-08-29 LAB — CYTOLOGY CVX/VAG DOC THIN PREP: NORMAL

## 2017-09-13 ENCOUNTER — FORM ENCOUNTER (OUTPATIENT)
Age: 42
End: 2017-09-13

## 2017-09-14 ENCOUNTER — OUTPATIENT (OUTPATIENT)
Dept: OUTPATIENT SERVICES | Facility: HOSPITAL | Age: 42
LOS: 1 days | End: 2017-09-14
Payer: MEDICAID

## 2017-09-14 VITALS
HEIGHT: 65 IN | TEMPERATURE: 97 F | SYSTOLIC BLOOD PRESSURE: 118 MMHG | HEART RATE: 83 BPM | WEIGHT: 246.92 LBS | DIASTOLIC BLOOD PRESSURE: 78 MMHG | RESPIRATION RATE: 16 BRPM | OXYGEN SATURATION: 97 %

## 2017-09-14 DIAGNOSIS — Z90.49 ACQUIRED ABSENCE OF OTHER SPECIFIED PARTS OF DIGESTIVE TRACT: Chronic | ICD-10-CM

## 2017-09-14 DIAGNOSIS — S82.892S OTHER FRACTURE OF LEFT LOWER LEG, SEQUELA: Chronic | ICD-10-CM

## 2017-09-14 DIAGNOSIS — E66.9 OBESITY, UNSPECIFIED: ICD-10-CM

## 2017-09-14 DIAGNOSIS — F60.3 BORDERLINE PERSONALITY DISORDER: ICD-10-CM

## 2017-09-14 DIAGNOSIS — R19.00 INTRA-ABDOMINAL AND PELVIC SWELLING, MASS AND LUMP, UNSPECIFIED SITE: ICD-10-CM

## 2017-09-14 DIAGNOSIS — M19.90 UNSPECIFIED OSTEOARTHRITIS, UNSPECIFIED SITE: ICD-10-CM

## 2017-09-14 DIAGNOSIS — F19.10 OTHER PSYCHOACTIVE SUBSTANCE ABUSE, UNCOMPLICATED: ICD-10-CM

## 2017-09-14 DIAGNOSIS — N88.8 OTHER SPECIFIED NONINFLAMMATORY DISORDERS OF CERVIX UTERI: ICD-10-CM

## 2017-09-14 DIAGNOSIS — J45.909 UNSPECIFIED ASTHMA, UNCOMPLICATED: ICD-10-CM

## 2017-09-14 DIAGNOSIS — F32.9 MAJOR DEPRESSIVE DISORDER, SINGLE EPISODE, UNSPECIFIED: ICD-10-CM

## 2017-09-14 DIAGNOSIS — F17.200 NICOTINE DEPENDENCE, UNSPECIFIED, UNCOMPLICATED: ICD-10-CM

## 2017-09-14 LAB
ANTIBODY ID 1_1: SIGNIFICANT CHANGE UP
BLD GP AB SCN SERPL QL: POSITIVE — SIGNIFICANT CHANGE UP
BUN SERPL-MCNC: 12 MG/DL — SIGNIFICANT CHANGE UP (ref 7–23)
CALCIUM SERPL-MCNC: 9.4 MG/DL — SIGNIFICANT CHANGE UP (ref 8.4–10.5)
CHLORIDE SERPL-SCNC: 102 MMOL/L — SIGNIFICANT CHANGE UP (ref 98–107)
CO2 SERPL-SCNC: 25 MMOL/L — SIGNIFICANT CHANGE UP (ref 22–31)
CREAT SERPL-MCNC: 0.62 MG/DL — SIGNIFICANT CHANGE UP (ref 0.5–1.3)
DAT POLY-SP REAG RBC QL: NEGATIVE — SIGNIFICANT CHANGE UP
GLUCOSE SERPL-MCNC: 126 MG/DL — HIGH (ref 70–99)
HCT VFR BLD CALC: 39.9 % — SIGNIFICANT CHANGE UP (ref 34.5–45)
HGB BLD-MCNC: 12.6 G/DL — SIGNIFICANT CHANGE UP (ref 11.5–15.5)
MCHC RBC-ENTMCNC: 28.1 PG — SIGNIFICANT CHANGE UP (ref 27–34)
MCHC RBC-ENTMCNC: 31.6 % — LOW (ref 32–36)
MCV RBC AUTO: 89.1 FL — SIGNIFICANT CHANGE UP (ref 80–100)
NRBC # FLD: 0 — SIGNIFICANT CHANGE UP
PLATELET # BLD AUTO: 169 K/UL — SIGNIFICANT CHANGE UP (ref 150–400)
PMV BLD: 11.3 FL — SIGNIFICANT CHANGE UP (ref 7–13)
POTASSIUM SERPL-MCNC: 3.7 MMOL/L — SIGNIFICANT CHANGE UP (ref 3.5–5.3)
POTASSIUM SERPL-SCNC: 3.7 MMOL/L — SIGNIFICANT CHANGE UP (ref 3.5–5.3)
RBC # BLD: 4.48 M/UL — SIGNIFICANT CHANGE UP (ref 3.8–5.2)
RBC # FLD: 12.6 % — SIGNIFICANT CHANGE UP (ref 10.3–14.5)
RH IG SCN BLD-IMP: POSITIVE — SIGNIFICANT CHANGE UP
SODIUM SERPL-SCNC: 142 MMOL/L — SIGNIFICANT CHANGE UP (ref 135–145)
WBC # BLD: 6.07 K/UL — SIGNIFICANT CHANGE UP (ref 3.8–10.5)
WBC # FLD AUTO: 6.07 K/UL — SIGNIFICANT CHANGE UP (ref 3.8–10.5)

## 2017-09-14 PROCEDURE — 86077 PHYS BLOOD BANK SERV XMATCH: CPT

## 2017-09-14 PROCEDURE — 71020: CPT | Mod: 26

## 2017-09-14 RX ORDER — SODIUM CHLORIDE 9 MG/ML
3 INJECTION INTRAMUSCULAR; INTRAVENOUS; SUBCUTANEOUS EVERY 8 HOURS
Qty: 0 | Refills: 0 | Status: DISCONTINUED | OUTPATIENT
Start: 2017-09-21 | End: 2017-09-25

## 2017-09-14 NOTE — H&P PST ADULT - RESPIRATORY AND THORAX COMMENTS
smokers cough, however states coughing more of recent 2 weeks - no fever. Denies asthma attack/admission. smokers cough, however states coughing more x 2 weeks - denies fever, denies phlegm/production. Denies asthma attack/admission.

## 2017-09-14 NOTE — H&P PST ADULT - SUBSTANCE USE COMMENT, PROFILE
Pt reports h/o Opioid abuse x 25 years, reports 6 months of "being cleaned", states she is presently at a Rehab center x 3 months which has helped in her journey of staying clean - denies marijuana or any other substance use/misuse

## 2017-09-14 NOTE — H&P PST ADULT - PMH
Arthritis    Asthma    Bipolar 1 disorder    Borderline personality disorder    Depression    Dysthymia    Herniated lumbar disc without myelopathy    Intra-abdominal and pelvic swelling, mass and lump, unspecified site    Obesity    Smoker    Substance abuse Arthritis    Asthma    Bipolar 1 disorder    Borderline personality disorder    Depression    Dysthymia    Herniated lumbar disc without myelopathy    Intra-abdominal and pelvic swelling, mass and lump, unspecified site    Obesity    Smoker    Substance abuse    Substance abuse in remission

## 2017-09-14 NOTE — H&P PST ADULT - PROBLEM SELECTOR PLAN 8
Pt in a Suboxone therapy program - daily dosage   Request notes on suboxone with medical clearance . Pt in a Suboxone therapy program - daily dosage   Request notes on Suboxone with medical clearance .

## 2017-09-14 NOTE — H&P PST ADULT - SKIN/BREAST COMMENTS
Reports cellulitis to both lower extremity and was admitted to [facility] in 7/2017 x 2 weeks  - reports has resolved Reports cellulitis to both lower extremity and was admitted to [facility] in 7/2017 x 2 weeks  - reports cellulitis has resolved

## 2017-09-14 NOTE — H&P PST ADULT - MUSCULOSKELETAL
details… detailed exam no joint swelling/no joint erythema/no joint warmth/no calf tenderness/normal strength/ROM intact

## 2017-09-14 NOTE — H&P PST ADULT - FAMILY HISTORY
Mother  Still living? No  Family history of breast cancer in female, Age at diagnosis: Age Unknown     Father  Still living? No  Diabetes mellitus, Age at diagnosis: Age Unknown  Family history of cerebrovascular accident (CVA), Age at diagnosis: Age Unknown

## 2017-09-14 NOTE — H&P PST ADULT - NEGATIVE MUSCULOSKELETAL SYMPTOMS
no arthralgia/no arthritis/no stiffness/no joint swelling/no myalgia/no muscle cramps/no neck pain/no muscle weakness

## 2017-09-14 NOTE — H&P PST ADULT - LYMPHATIC
anterior cervical R/anterior cervical L/posterior cervical R/supraclavicular R/supraclavicular L/posterior cervical L

## 2017-09-14 NOTE — H&P PST ADULT - NEGATIVE GENERAL GENITOURINARY SYMPTOMS
no flank pain R/no urine discoloration/no hematuria/no renal colic/no flank pain L no flank pain L/no hematuria/no flank pain R/normal urinary frequency/no renal colic

## 2017-09-14 NOTE — H&P PST ADULT - NEGATIVE ENMT SYMPTOMS
no nasal discharge/no nasal congestion/no nasal obstruction/no ear pain/no tinnitus/no hearing difficulty/no vertigo/no sinus symptoms

## 2017-09-14 NOTE — H&P PST ADULT - NEGATIVE PSYCHIATRIC SYMPTOMS
no memory loss/no suicidal ideation/no paranoia/no depression/no insomnia/no anxiety no mood swings/no suicidal ideation/no paranoia/no agitation/no depression/no insomnia/no anxiety/no memory loss

## 2017-09-14 NOTE — H&P PST ADULT - ASSESSMENT
Intra-abdominal and pelvic swelling, mass and lump, unspecified  Other specified noninflammatory disorders of cervix

## 2017-09-14 NOTE — H&P PST ADULT - NEGATIVE NEUROLOGICAL SYMPTOMS
no focal seizures/no transient paralysis/no generalized seizures/no syncope/no tremors/no paresthesias/no weakness

## 2017-09-14 NOTE — H&P PST ADULT - PSYCHIATRIC COMMENTS
H/o Bipolar and major depression disorder, presently at a Rehab facility and gets regular psychiatric evaluation at [facility]

## 2017-09-14 NOTE — H&P PST ADULT - NEGATIVE OPHTHALMOLOGIC SYMPTOMS
no lacrimation L/no lacrimation R/no diplopia/no blurred vision R/no discharge R/no blurred vision L/no discharge L/no photophobia

## 2017-09-14 NOTE — H&P PST ADULT - PROBLEM SELECTOR PLAN 1
Robotic Total Laparoscopic Hysterectomy, Bilateral Salpingectomy, Possible Bilateral Salpingo Oophorectomy, Staging scheduled for 9/21/2017.  Pre-op instructions given. Pt verbalized understanding.  Pepcid given for GI prophylaxis.  UCG ordered STAT for day of procedure - urine container given.  Gyne order set completed - Heparin & Accu-Chek ordered STAT for day of procedure.  ABO ordered STAT for day of procedure.  Medical clearance requested - see copy of Echo and EKG in chart. Robotic laparoscopic removal of pelvic mass with possible BSO, TLH and Staging scheduled for 9/21/2017.  Pre-op instructions given. Pt verbalized understanding.  Pepcid given for GI prophylaxis.  UCG ordered STAT for day of procedure - urine container given.  Gyne order set completed - Heparin & Accu-Chek ordered STAT for day of procedure.  ABO ordered STAT for day of procedure.  Medical clearance requested - see copy of Echo and EKG in chart.

## 2017-09-14 NOTE — H&P PST ADULT - HISTORY OF PRESENT ILLNESS
43yo obese female current everyday smoker with Asthma, Anemia, Major depressive disorder, Borderline personality disorder with h/o Opioid addition, presently at [facility] for 3 months on the Suboxone program. Pt reports Abdominal CT scan done in 8/2017 work-up in [facility] while being ill showed Uterine mass. Pt presents today for presurgical evaluation for Robotic Total Laparoscopic Hysterectomy, Bilateral Salpingectomy, Possible Bilateral Salpingo Oophorectomy, Staging scheduled for 9/21/2017.

## 2017-09-20 NOTE — ASU PATIENT PROFILE, ADULT - PMH
Arthritis    Asthma    Bipolar 1 disorder    Borderline personality disorder    Depression    Dysthymia    Herniated lumbar disc without myelopathy    Intra-abdominal and pelvic swelling, mass and lump, unspecified site    Obesity    Smoker    Substance abuse    Substance abuse in remission Arthritis    Asthma    Borderline personality disorder    Depression    Dysthymia    Herniated lumbar disc without myelopathy    Intra-abdominal and pelvic swelling, mass and lump, unspecified site    Obesity    PTSD (post-traumatic stress disorder)    Smoker    Substance abuse    Substance abuse in remission

## 2017-09-21 ENCOUNTER — INPATIENT (INPATIENT)
Facility: HOSPITAL | Age: 42
LOS: 3 days | Discharge: NURSING, REHAB OR LONG-TERM CARE | End: 2017-09-25
Attending: OBSTETRICS & GYNECOLOGY | Admitting: OBSTETRICS & GYNECOLOGY
Payer: MEDICAID

## 2017-09-21 ENCOUNTER — RESULT REVIEW (OUTPATIENT)
Age: 42
End: 2017-09-21

## 2017-09-21 ENCOUNTER — APPOINTMENT (OUTPATIENT)
Dept: GYNECOLOGIC ONCOLOGY | Facility: HOSPITAL | Age: 42
End: 2017-09-21
Payer: MEDICAID

## 2017-09-21 VITALS
TEMPERATURE: 98 F | DIASTOLIC BLOOD PRESSURE: 82 MMHG | OXYGEN SATURATION: 95 % | WEIGHT: 246.92 LBS | HEIGHT: 65 IN | HEART RATE: 79 BPM | RESPIRATION RATE: 20 BRPM | SYSTOLIC BLOOD PRESSURE: 130 MMHG

## 2017-09-21 DIAGNOSIS — N88.8 OTHER SPECIFIED NONINFLAMMATORY DISORDERS OF CERVIX UTERI: ICD-10-CM

## 2017-09-21 DIAGNOSIS — S82.892S OTHER FRACTURE OF LEFT LOWER LEG, SEQUELA: Chronic | ICD-10-CM

## 2017-09-21 DIAGNOSIS — Z90.49 ACQUIRED ABSENCE OF OTHER SPECIFIED PARTS OF DIGESTIVE TRACT: Chronic | ICD-10-CM

## 2017-09-21 LAB — HCG UR QL: NEGATIVE — SIGNIFICANT CHANGE UP

## 2017-09-21 PROCEDURE — 88332 PATH CONSLTJ SURG EA ADD BLK: CPT | Mod: 26

## 2017-09-21 PROCEDURE — 88305 TISSUE EXAM BY PATHOLOGIST: CPT | Mod: 26

## 2017-09-21 PROCEDURE — 88307 TISSUE EXAM BY PATHOLOGIST: CPT | Mod: 26

## 2017-09-21 PROCEDURE — 88313 SPECIAL STAINS GROUP 2: CPT | Mod: 26

## 2017-09-21 PROCEDURE — 88331 PATH CONSLTJ SURG 1 BLK 1SPC: CPT | Mod: 26

## 2017-09-21 PROCEDURE — S2900 ROBOTIC SURGICAL SYSTEM: CPT | Mod: NC

## 2017-09-21 PROCEDURE — 58573 TLH W/T/O UTERUS OVER 250 G: CPT

## 2017-09-21 RX ORDER — IBUPROFEN 200 MG
600 TABLET ORAL EVERY 6 HOURS
Qty: 0 | Refills: 0 | Status: DISCONTINUED | OUTPATIENT
Start: 2017-09-21 | End: 2017-09-25

## 2017-09-21 RX ORDER — FENTANYL CITRATE 50 UG/ML
25 INJECTION INTRAVENOUS
Qty: 0 | Refills: 0 | Status: DISCONTINUED | OUTPATIENT
Start: 2017-09-21 | End: 2017-09-21

## 2017-09-21 RX ORDER — OXYCODONE HYDROCHLORIDE 5 MG/1
5 TABLET ORAL EVERY 4 HOURS
Qty: 0 | Refills: 0 | Status: DISCONTINUED | OUTPATIENT
Start: 2017-09-21 | End: 2017-09-21

## 2017-09-21 RX ORDER — LIDOCAINE 4 G/100G
1 CREAM TOPICAL ONCE
Qty: 0 | Refills: 0 | Status: DISCONTINUED | OUTPATIENT
Start: 2017-09-21 | End: 2017-09-25

## 2017-09-21 RX ORDER — TOPIRAMATE 25 MG
100 TABLET ORAL DAILY
Qty: 0 | Refills: 0 | Status: DISCONTINUED | OUTPATIENT
Start: 2017-09-21 | End: 2017-09-25

## 2017-09-21 RX ORDER — GABAPENTIN 400 MG/1
800 CAPSULE ORAL THREE TIMES A DAY
Qty: 0 | Refills: 0 | Status: DISCONTINUED | OUTPATIENT
Start: 2017-09-21 | End: 2017-09-25

## 2017-09-21 RX ORDER — PANTOPRAZOLE SODIUM 20 MG/1
40 TABLET, DELAYED RELEASE ORAL
Qty: 0 | Refills: 0 | Status: DISCONTINUED | OUTPATIENT
Start: 2017-09-21 | End: 2017-09-25

## 2017-09-21 RX ORDER — HYDROMORPHONE HYDROCHLORIDE 2 MG/ML
1 INJECTION INTRAMUSCULAR; INTRAVENOUS; SUBCUTANEOUS
Qty: 0 | Refills: 0 | Status: DISCONTINUED | OUTPATIENT
Start: 2017-09-21 | End: 2017-09-21

## 2017-09-21 RX ORDER — KETOROLAC TROMETHAMINE 30 MG/ML
30 SYRINGE (ML) INJECTION ONCE
Qty: 0 | Refills: 0 | Status: DISCONTINUED | OUTPATIENT
Start: 2017-09-21 | End: 2017-09-21

## 2017-09-21 RX ORDER — ALBUTEROL 90 UG/1
2 AEROSOL, METERED ORAL EVERY 6 HOURS
Qty: 0 | Refills: 0 | Status: DISCONTINUED | OUTPATIENT
Start: 2017-09-21 | End: 2017-09-25

## 2017-09-21 RX ORDER — FENTANYL CITRATE 50 UG/ML
50 INJECTION INTRAVENOUS
Qty: 0 | Refills: 0 | Status: DISCONTINUED | OUTPATIENT
Start: 2017-09-21 | End: 2017-09-21

## 2017-09-21 RX ORDER — QUETIAPINE FUMARATE 200 MG/1
400 TABLET, FILM COATED ORAL AT BEDTIME
Qty: 0 | Refills: 0 | Status: DISCONTINUED | OUTPATIENT
Start: 2017-09-21 | End: 2017-09-25

## 2017-09-21 RX ORDER — CHOLECALCIFEROL (VITAMIN D3) 125 MCG
0 CAPSULE ORAL
Qty: 0 | Refills: 0 | COMMUNITY

## 2017-09-21 RX ORDER — SODIUM CHLORIDE 9 MG/ML
1000 INJECTION, SOLUTION INTRAVENOUS
Qty: 0 | Refills: 0 | Status: DISCONTINUED | OUTPATIENT
Start: 2017-09-21 | End: 2017-09-21

## 2017-09-21 RX ORDER — SODIUM CHLORIDE 9 MG/ML
1000 INJECTION, SOLUTION INTRAVENOUS
Qty: 0 | Refills: 0 | Status: DISCONTINUED | OUTPATIENT
Start: 2017-09-21 | End: 2017-09-22

## 2017-09-21 RX ORDER — OXYCODONE HYDROCHLORIDE 5 MG/1
10 TABLET ORAL EVERY 4 HOURS
Qty: 0 | Refills: 0 | Status: DISCONTINUED | OUTPATIENT
Start: 2017-09-21 | End: 2017-09-21

## 2017-09-21 RX ORDER — DOXAZOSIN MESYLATE 4 MG
1 TABLET ORAL AT BEDTIME
Qty: 0 | Refills: 0 | Status: DISCONTINUED | OUTPATIENT
Start: 2017-09-21 | End: 2017-09-25

## 2017-09-21 RX ORDER — HEPARIN SODIUM 5000 [USP'U]/ML
5000 INJECTION INTRAVENOUS; SUBCUTANEOUS ONCE
Qty: 0 | Refills: 0 | Status: COMPLETED | OUTPATIENT
Start: 2017-09-21 | End: 2017-09-21

## 2017-09-21 RX ORDER — BUPRENORPHINE AND NALOXONE 2; .5 MG/1; MG/1
2 TABLET SUBLINGUAL
Qty: 0 | Refills: 0 | Status: DISCONTINUED | OUTPATIENT
Start: 2017-09-21 | End: 2017-09-22

## 2017-09-21 RX ORDER — TRAZODONE HCL 50 MG
300 TABLET ORAL AT BEDTIME
Qty: 0 | Refills: 0 | Status: DISCONTINUED | OUTPATIENT
Start: 2017-09-21 | End: 2017-09-25

## 2017-09-21 RX ORDER — ESCITALOPRAM OXALATE 10 MG/1
20 TABLET, FILM COATED ORAL AT BEDTIME
Qty: 0 | Refills: 0 | Status: DISCONTINUED | OUTPATIENT
Start: 2017-09-21 | End: 2017-09-25

## 2017-09-21 RX ORDER — FERROUS SULFATE 325(65) MG
1 TABLET ORAL
Qty: 0 | Refills: 0 | COMMUNITY

## 2017-09-21 RX ORDER — ACETAMINOPHEN 500 MG
975 TABLET ORAL EVERY 6 HOURS
Qty: 0 | Refills: 0 | Status: DISCONTINUED | OUTPATIENT
Start: 2017-09-21 | End: 2017-09-25

## 2017-09-21 RX ORDER — BUPRENORPHINE AND NALOXONE 2; .5 MG/1; MG/1
32 TABLET SUBLINGUAL
Qty: 0 | Refills: 0 | COMMUNITY

## 2017-09-21 RX ORDER — ONDANSETRON 8 MG/1
4 TABLET, FILM COATED ORAL ONCE
Qty: 0 | Refills: 0 | Status: DISCONTINUED | OUTPATIENT
Start: 2017-09-21 | End: 2017-09-21

## 2017-09-21 RX ADMIN — Medication 30 MILLIGRAM(S): at 22:26

## 2017-09-21 RX ADMIN — SODIUM CHLORIDE 125 MILLILITER(S): 9 INJECTION, SOLUTION INTRAVENOUS at 18:31

## 2017-09-21 RX ADMIN — FENTANYL CITRATE 50 MICROGRAM(S): 50 INJECTION INTRAVENOUS at 17:30

## 2017-09-21 RX ADMIN — HYDROMORPHONE HYDROCHLORIDE 1 MILLIGRAM(S): 2 INJECTION INTRAMUSCULAR; INTRAVENOUS; SUBCUTANEOUS at 18:30

## 2017-09-21 RX ADMIN — QUETIAPINE FUMARATE 400 MILLIGRAM(S): 200 TABLET, FILM COATED ORAL at 22:07

## 2017-09-21 RX ADMIN — Medication 975 MILLIGRAM(S): at 21:04

## 2017-09-21 RX ADMIN — Medication 300 MILLIGRAM(S): at 22:07

## 2017-09-21 RX ADMIN — FENTANYL CITRATE 50 MICROGRAM(S): 50 INJECTION INTRAVENOUS at 17:20

## 2017-09-21 RX ADMIN — FENTANYL CITRATE 50 MICROGRAM(S): 50 INJECTION INTRAVENOUS at 18:30

## 2017-09-21 RX ADMIN — Medication 975 MILLIGRAM(S): at 20:24

## 2017-09-21 RX ADMIN — HYDROMORPHONE HYDROCHLORIDE 1 MILLIGRAM(S): 2 INJECTION INTRAMUSCULAR; INTRAVENOUS; SUBCUTANEOUS at 18:15

## 2017-09-21 RX ADMIN — FENTANYL CITRATE 50 MICROGRAM(S): 50 INJECTION INTRAVENOUS at 17:25

## 2017-09-21 RX ADMIN — GABAPENTIN 800 MILLIGRAM(S): 400 CAPSULE ORAL at 22:08

## 2017-09-21 RX ADMIN — FENTANYL CITRATE 50 MICROGRAM(S): 50 INJECTION INTRAVENOUS at 19:00

## 2017-09-21 RX ADMIN — FENTANYL CITRATE 50 MICROGRAM(S): 50 INJECTION INTRAVENOUS at 19:10

## 2017-09-21 RX ADMIN — HEPARIN SODIUM 5000 UNIT(S): 5000 INJECTION INTRAVENOUS; SUBCUTANEOUS at 12:38

## 2017-09-21 RX ADMIN — ESCITALOPRAM OXALATE 20 MILLIGRAM(S): 10 TABLET, FILM COATED ORAL at 22:07

## 2017-09-21 RX ADMIN — FENTANYL CITRATE 50 MICROGRAM(S): 50 INJECTION INTRAVENOUS at 18:40

## 2017-09-21 RX ADMIN — HYDROMORPHONE HYDROCHLORIDE 1 MILLIGRAM(S): 2 INJECTION INTRAMUSCULAR; INTRAVENOUS; SUBCUTANEOUS at 17:55

## 2017-09-21 RX ADMIN — HYDROMORPHONE HYDROCHLORIDE 1 MILLIGRAM(S): 2 INJECTION INTRAMUSCULAR; INTRAVENOUS; SUBCUTANEOUS at 18:05

## 2017-09-21 RX ADMIN — Medication 30 MILLIGRAM(S): at 22:41

## 2017-09-21 RX ADMIN — SODIUM CHLORIDE 3 MILLILITER(S): 9 INJECTION INTRAMUSCULAR; INTRAVENOUS; SUBCUTANEOUS at 22:27

## 2017-09-21 NOTE — PROGRESS NOTE ADULT - SUBJECTIVE AND OBJECTIVE BOX
PA GYN/ONC POST OP NOTE:    Pt seen in PACU earlier was restless & with C/O pain & had been medicated again for her pain. Pt was also complaining of having a velázquez catheter and arguing with family members because they told me she doesn't handle pain very well. Encouraged her to try to relax and get some rest.    Vital Signs Last 24 Hrs  T(C): 36.4 (21 Sep 2017 19:00), Max: 36.8 (21 Sep 2017 11:42)  T(F): 97.5 (21 Sep 2017 19:00), Max: 98.2 (21 Sep 2017 11:42)  HR: 59 (21 Sep 2017 20:15) (43 - 97)  BP: 131/77 (21 Sep 2017 20:15) (108/90 - 131/77)  BP(mean): --  RR: 15 (21 Sep 2017 20:15) (12 - 22)  SpO2: 100% (21 Sep 2017 20:15) (92% - 100%)    U/O:    I&O's Detail    21 Sep 2017 07:01  -  21 Sep 2017 20:40  --------------------------------------------------------  IN:    lactated ringers.: 250 mL    Oral Fluid: 300 mL  Total IN: 550 mL    OUT:    Indwelling Catheter - Urethral: 155 mL  Total OUT: 155 mL    Total NET: 395 mL          PHYSICAL EXAM:  CHEST/LUNG: CTA B/L, decreased BS at bases  HEART: S1S2 RRR   ABDOMEN: Soft, appropriate tenderness  INCISION: scope sites C/D/I  EXTREMITIES: NT B/L, PAS in place      MEDICATIONS  (STANDING):  gabapentin 800 milliGRAM(s) Oral three times a day  traZODone 300 milliGRAM(s) Oral at bedtime  pantoprazole    Tablet 40 milliGRAM(s) Oral before breakfast  sodium chloride 0.9% lock flush 3 milliLiter(s) IV Push every 8 hours  lactated ringers. 1000 milliLiter(s) (125 mL/Hr) IV Continuous <Continuous>  acetaminophen   Tablet. 975 milliGRAM(s) Oral every 6 hours  escitalopram 20 milliGRAM(s) Oral at bedtime  doxazosin 1 milliGRAM(s) Oral at bedtime  ibuprofen  Tablet 600 milliGRAM(s) Oral every 6 hours  QUEtiapine 400 milliGRAM(s) Oral at bedtime  topiramate 100 milliGRAM(s) Oral daily  buprenorphine 2 mG/naloxone 0.5 mG SL  Tablet 2 Tablet(s) SubLingual two times a day    MEDICATIONS  (PRN):  fentaNYL    Injectable 25 MICROGram(s) IV Push every 5 minutes PRN Moderate Pain  ALBUTerol    90 MICROgram(s) HFA Inhaler 2 Puff(s) Inhalation every 6 hours PRN Shortness of Breath and/or Wheezing  ondansetron Injectable 4 milliGRAM(s) IV Push once PRN Nausea and/or Vomiting

## 2017-09-21 NOTE — BRIEF OPERATIVE NOTE - PROCEDURE
<<-----Click on this checkbox to enter Procedure Bilateral salpingectomy  09/21/2017    Active  PRO  Total laparoscopic hysterectomy [TLH]  09/21/2017  robotic assisted  Active  [id number]

## 2017-09-21 NOTE — PROGRESS NOTE ADULT - ASSESSMENT
43 Y/O S/P Robotic TLH, B/L Salpingectomy  Plan  1. Patient encouraged to ambulate & use incentive spirometer  2. Pain management  3. [place]berry to gravity  4. IVFluids LR @125mL/hr  5. Regular diet as tolerates  6. CBC, BMP, Mg & Phos in AM POD#1

## 2017-09-22 ENCOUNTER — TRANSCRIPTION ENCOUNTER (OUTPATIENT)
Age: 42
End: 2017-09-22

## 2017-09-22 DIAGNOSIS — Z90.710 ACQUIRED ABSENCE OF BOTH CERVIX AND UTERUS: ICD-10-CM

## 2017-09-22 LAB
BASOPHILS # BLD AUTO: 0.02 K/UL — SIGNIFICANT CHANGE UP (ref 0–0.2)
BASOPHILS NFR BLD AUTO: 0.2 % — SIGNIFICANT CHANGE UP (ref 0–2)
EOSINOPHIL # BLD AUTO: 0.01 K/UL — SIGNIFICANT CHANGE UP (ref 0–0.5)
EOSINOPHIL NFR BLD AUTO: 0.1 % — SIGNIFICANT CHANGE UP (ref 0–6)
HCT VFR BLD CALC: 36.2 % — SIGNIFICANT CHANGE UP (ref 34.5–45)
HGB BLD-MCNC: 11.7 G/DL — SIGNIFICANT CHANGE UP (ref 11.5–15.5)
IMM GRANULOCYTES # BLD AUTO: 0.04 # — SIGNIFICANT CHANGE UP
IMM GRANULOCYTES NFR BLD AUTO: 0.3 % — SIGNIFICANT CHANGE UP (ref 0–1.5)
LYMPHOCYTES # BLD AUTO: 1.3 K/UL — SIGNIFICANT CHANGE UP (ref 1–3.3)
LYMPHOCYTES # BLD AUTO: 10.3 % — LOW (ref 13–44)
MCHC RBC-ENTMCNC: 27.9 PG — SIGNIFICANT CHANGE UP (ref 27–34)
MCHC RBC-ENTMCNC: 32.3 % — SIGNIFICANT CHANGE UP (ref 32–36)
MCV RBC AUTO: 86.2 FL — SIGNIFICANT CHANGE UP (ref 80–100)
MONOCYTES # BLD AUTO: 0.65 K/UL — SIGNIFICANT CHANGE UP (ref 0–0.9)
MONOCYTES NFR BLD AUTO: 5.2 % — SIGNIFICANT CHANGE UP (ref 2–14)
NEUTROPHILS # BLD AUTO: 10.59 K/UL — HIGH (ref 1.8–7.4)
NEUTROPHILS NFR BLD AUTO: 83.9 % — HIGH (ref 43–77)
NRBC # FLD: 0 — SIGNIFICANT CHANGE UP
PLATELET # BLD AUTO: 233 K/UL — SIGNIFICANT CHANGE UP (ref 150–400)
PMV BLD: 10.5 FL — SIGNIFICANT CHANGE UP (ref 7–13)
RBC # BLD: 4.2 M/UL — SIGNIFICANT CHANGE UP (ref 3.8–5.2)
RBC # FLD: 12.4 % — SIGNIFICANT CHANGE UP (ref 10.3–14.5)
WBC # BLD: 12.61 K/UL — HIGH (ref 3.8–10.5)
WBC # FLD AUTO: 12.61 K/UL — HIGH (ref 3.8–10.5)

## 2017-09-22 PROCEDURE — S2900 ROBOTIC SURGICAL SYSTEM: CPT | Mod: NC

## 2017-09-22 PROCEDURE — 58573 TLH W/T/O UTERUS OVER 250 G: CPT

## 2017-09-22 RX ORDER — ACETAMINOPHEN 500 MG
3 TABLET ORAL
Qty: 0 | Refills: 0 | COMMUNITY

## 2017-09-22 RX ORDER — BUPRENORPHINE AND NALOXONE 2; .5 MG/1; MG/1
4 TABLET SUBLINGUAL ONCE
Qty: 0 | Refills: 0 | Status: DISCONTINUED | OUTPATIENT
Start: 2017-09-22 | End: 2017-09-22

## 2017-09-22 RX ORDER — BENZOCAINE AND MENTHOL 5; 1 G/100ML; G/100ML
1 LIQUID ORAL THREE TIMES A DAY
Qty: 0 | Refills: 0 | Status: DISCONTINUED | OUTPATIENT
Start: 2017-09-22 | End: 2017-09-25

## 2017-09-22 RX ORDER — BUPRENORPHINE AND NALOXONE 2; .5 MG/1; MG/1
4 TABLET SUBLINGUAL DAILY
Qty: 0 | Refills: 0 | Status: DISCONTINUED | OUTPATIENT
Start: 2017-09-23 | End: 2017-09-25

## 2017-09-22 RX ORDER — IBUPROFEN 200 MG
3 TABLET ORAL
Qty: 0 | Refills: 0 | COMMUNITY

## 2017-09-22 RX ORDER — KETOROLAC TROMETHAMINE 30 MG/ML
30 SYRINGE (ML) INJECTION ONCE
Qty: 0 | Refills: 0 | Status: DISCONTINUED | OUTPATIENT
Start: 2017-09-22 | End: 2017-09-22

## 2017-09-22 RX ORDER — BUPRENORPHINE AND NALOXONE 2; .5 MG/1; MG/1
4 TABLET SUBLINGUAL DAILY
Qty: 0 | Refills: 0 | Status: DISCONTINUED | OUTPATIENT
Start: 2017-09-22 | End: 2017-09-22

## 2017-09-22 RX ORDER — HEPARIN SODIUM 5000 [USP'U]/ML
5000 INJECTION INTRAVENOUS; SUBCUTANEOUS EVERY 12 HOURS
Qty: 0 | Refills: 0 | Status: DISCONTINUED | OUTPATIENT
Start: 2017-09-22 | End: 2017-09-25

## 2017-09-22 RX ORDER — SODIUM CHLORIDE 9 MG/ML
1000 INJECTION, SOLUTION INTRAVENOUS
Qty: 0 | Refills: 0 | Status: DISCONTINUED | OUTPATIENT
Start: 2017-09-22 | End: 2017-09-22

## 2017-09-22 RX ORDER — INFLUENZA VIRUS VACCINE 15; 15; 15; 15 UG/.5ML; UG/.5ML; UG/.5ML; UG/.5ML
0.5 SUSPENSION INTRAMUSCULAR ONCE
Qty: 0 | Refills: 0 | Status: COMPLETED | OUTPATIENT
Start: 2017-09-22 | End: 2017-09-25

## 2017-09-22 RX ORDER — KETOROLAC TROMETHAMINE 30 MG/ML
15 SYRINGE (ML) INJECTION ONCE
Qty: 0 | Refills: 0 | Status: DISCONTINUED | OUTPATIENT
Start: 2017-09-22 | End: 2017-09-22

## 2017-09-22 RX ADMIN — Medication 30 MILLIGRAM(S): at 17:43

## 2017-09-22 RX ADMIN — Medication 100 MILLIGRAM(S): at 14:51

## 2017-09-22 RX ADMIN — Medication 15 MILLIGRAM(S): at 11:15

## 2017-09-22 RX ADMIN — ESCITALOPRAM OXALATE 20 MILLIGRAM(S): 10 TABLET, FILM COATED ORAL at 21:17

## 2017-09-22 RX ADMIN — Medication 975 MILLIGRAM(S): at 21:13

## 2017-09-22 RX ADMIN — GABAPENTIN 800 MILLIGRAM(S): 400 CAPSULE ORAL at 14:51

## 2017-09-22 RX ADMIN — BENZOCAINE AND MENTHOL 1 LOZENGE: 5; 1 LIQUID ORAL at 21:17

## 2017-09-22 RX ADMIN — Medication 30 MILLIGRAM(S): at 18:00

## 2017-09-22 RX ADMIN — QUETIAPINE FUMARATE 400 MILLIGRAM(S): 200 TABLET, FILM COATED ORAL at 21:17

## 2017-09-22 RX ADMIN — SODIUM CHLORIDE 3 MILLILITER(S): 9 INJECTION INTRAMUSCULAR; INTRAVENOUS; SUBCUTANEOUS at 14:51

## 2017-09-22 RX ADMIN — GABAPENTIN 800 MILLIGRAM(S): 400 CAPSULE ORAL at 05:18

## 2017-09-22 RX ADMIN — Medication 300 MILLIGRAM(S): at 21:16

## 2017-09-22 RX ADMIN — BUPRENORPHINE AND NALOXONE 4 TABLET(S): 2; .5 TABLET SUBLINGUAL at 15:30

## 2017-09-22 RX ADMIN — BUPRENORPHINE AND NALOXONE 4 TABLET(S): 2; .5 TABLET SUBLINGUAL at 14:47

## 2017-09-22 RX ADMIN — GABAPENTIN 800 MILLIGRAM(S): 400 CAPSULE ORAL at 21:17

## 2017-09-22 RX ADMIN — PANTOPRAZOLE SODIUM 40 MILLIGRAM(S): 20 TABLET, DELAYED RELEASE ORAL at 06:32

## 2017-09-22 RX ADMIN — Medication 15 MILLIGRAM(S): at 10:55

## 2017-09-22 RX ADMIN — SODIUM CHLORIDE 3 MILLILITER(S): 9 INJECTION INTRAMUSCULAR; INTRAVENOUS; SUBCUTANEOUS at 05:14

## 2017-09-22 RX ADMIN — Medication 975 MILLIGRAM(S): at 20:18

## 2017-09-22 RX ADMIN — SODIUM CHLORIDE 3 MILLILITER(S): 9 INJECTION INTRAMUSCULAR; INTRAVENOUS; SUBCUTANEOUS at 21:13

## 2017-09-22 RX ADMIN — Medication 600 MILLIGRAM(S): at 23:51

## 2017-09-22 NOTE — DISCHARGE NOTE ADULT - PATIENT PORTAL LINK FT
“You can access the [facility] Patient Portal, offered by [facility], by registering with the following website: [email or web address]”

## 2017-09-22 NOTE — DISCHARGE NOTE ADULT - CARE PLAN
Principal Discharge DX:	S/P hysterectomy with oophorectomy  Goal:	wellness  Instructions for follow-up, activity and diet:	Regular diet as tolerated, regular activity as tolerated, no heavy lifting for first two weeks.  Take tylenol and motrin as needed for pain control.  Nothing per vagina (ie no tampons, douching, or intercourse) until cleared by your doctor.  Shower only, no baths.  Please make an appointment to see Dr Kwon in 2 weeks.  Call your doctor or go to the ED if you have bleeding that does not stop, are unable to urinate, or have a fever >100.4.

## 2017-09-22 NOTE — DISCHARGE NOTE ADULT - INSTRUCTIONS
call md office if having temperature above 101,if not tolerating diet ,nauseous ,vomitting ,if incision site looks red ,swollen or discharge noted,if having excruciating abdominal pain not relieved with pain medication

## 2017-09-22 NOTE — DISCHARGE NOTE ADULT - MEDICATION SUMMARY - MEDICATIONS TO STOP TAKING
I will STOP taking the medications listed below when I get home from the hospital:    NAPROXEN     TAB 375MG  -- 1  by mouth 2 times a day  last dose 9/14

## 2017-09-22 NOTE — CHART NOTE - NSCHARTNOTEFT_GEN_A_CORE
Patient evaluated at bedside this afternoon. Reporting pain, but now improved on her home dose of suboxone. Pt only voided once since velázquez removal, 150 mL    Objective  T(C): 36.5 (09-22-17 @ 14:08), Max: 37.1 (09-22-17 @ 09:21)  HR: 78 (09-22-17 @ 14:08) (78 - 81)  BP: 87/50 (09-22-17 @ 14:08) (87/50 - 95/58)  RR: 22 (09-22-17 @ 14:08) (22 - 22)  SpO2: 95% (09-22-17 @ 14:08) (95% - 95%)    09-21 @ 07:01  -  09-22 @ 07:00  --------------------------------------------------------  IN: 860 mL / OUT: 885 mL / NET: -25 mL    09-22 @ 07:01  -  09-22 @ 17:43  --------------------------------------------------------  IN: 0 mL / OUT: 150 mL / NET: -150 mL      Gen: NAD  Abd: Soft, non-distended, appropriately tender    P:  - increase IVF to 150 mL/hr  - continue to work with pt's rehab to try and get her back home. Can be d/c'ed once she's accepted    BUDDY Tolbert, PGY-2

## 2017-09-22 NOTE — DISCHARGE NOTE ADULT - HOSPITAL COURSE
Patient was admitted for scheduled RA TLH and bilateral salpingectomy for fibroids.  Please see operative report for details.  The procedure was uncomplicated with an EBL of 50 cc.      POD#1 the hct dropped appropriately from 39.9->36.2.     On the day of discharge the patient is afebrile and hemodynamically stable. She is ambulating without assistance, voiding spontaneously, and tolerating regular diet. Pain is well controlled on oral medication. She is cleared for discharge with instructions for appropriate follow up. Patient was admitted for scheduled RA TLH and bilateral salpingectomy for fibroids.  Please see operative report for details.  The procedure was uncomplicated with an EBL of 50 cc.      POD#1 the hct dropped appropriately from 39.9->36.2.     Patient remained stable in the hospital throughout POD#2-POD#4 awaiting disposition.  She required minimal tylenol and motrin for analgesia.    On the day of discharge the patient is afebrile and hemodynamically stable. She is ambulating without assistance, voiding spontaneously, and tolerating regular diet. Pain is well controlled on oral medication. She is cleared for discharge with instructions for appropriate follow up with Dr Kwon in 2 weeks. 43 y/o woman with morbid obesity was admitted for scheduled RA TLH and bilateral salpingectomy for fibroids.  Please see operative report for details.  The procedure was uncomplicated with an EBL of 50 cc.      POD#1 the hct dropped appropriately from 39.9->36.2.     Patient remained stable in the hospital throughout POD#2-POD#4 awaiting disposition.  She required minimal tylenol and motrin for analgesia.    On the day of discharge the patient is afebrile and hemodynamically stable. She is ambulating without assistance, voiding spontaneously, and tolerating regular diet. Pain is well controlled on oral medication. She is cleared for discharge with instructions for appropriate follow up with Dr Kwon in 2 weeks.

## 2017-09-22 NOTE — DISCHARGE NOTE ADULT - CONDITIONS AT DISCHARGE
vss,tolerating diet,iv discontinued ,ambulating as tolerated ,lap sites intact ,voiding without trouble ,

## 2017-09-22 NOTE — DISCHARGE NOTE ADULT - CARE PROVIDER_API CALL
Mary Kwon), Gynecologic Oncology; Obstetrics and Gynecology  [address] [place] [address]  Phone: [phone]  Fax: [phone]

## 2017-09-22 NOTE — PROGRESS NOTE ADULT - PROBLEM SELECTOR PLAN 1
Neuro: cont po tylenol and motrin, cont patient home meds including gabapentin and suboxone   CV: Hemodynamically stable, vital signs stable  Pulm: Saturating well on room air, encourage incentive spirometry  GI: Cont regular diet  :  DTV by 1pm  Heme: c/w HSQ and SCDs for DVT ppx  Dispo: Continue routine post-op care    PAPO Vargas, PGY4 Neuro: cont po tylenol and motrin, cont patient home meds including gabapentin and suboxone   CV: Hemodynamically stable, vital signs stable  Pulm: Saturating well on room air but noted bilateral crackles, encouraged incentive spirometry use and inhaler prn  GI: Cont regular diet  :  DTV by 1pm  Heme: c/w HSQ and SCDs for DVT ppx  Dispo: Continue routine post-op care    PAPO Vargas, PGY4

## 2017-09-22 NOTE — DISCHARGE NOTE ADULT - MEDICATION SUMMARY - MEDICATIONS TO TAKE
I will START or STAY ON the medications listed below when I get home from the hospital:    Tylenol 325 mg oral capsule  -- 3 cap(s) by mouth every 6 hours, As Needed - for mild pain  -- Indication: For Pain    ibuprofen 200 mg oral capsule  -- 3 cap(s) by mouth every 6 hours, As Needed - for mild pain  -- Indication: For Pain    Suboxone  -- 32 milligram(s) sublingual. Pt reports 32mg sublingual  -- Indication: For home med    GABAPENTIN   TAB 800MG  -- 1  by mouth 3 times a day  -- Indication: For home med    topiramate 100 mg oral tablet  -- 1 tab(s) by mouth once a day  -- Indication: For home med    TRAZODONE    TAB 100MG  -- 3  by mouth once a day (at bedtime)  -- Indication: For home med    ESCITALOPRAM TAB 20MG  -- 1  by mouth once a day (at bedtime)  -- Indication: For home med    QUEtiapine 400 mg oral tablet  -- 1 tab(s) by mouth once a day (at bedtime)  -- Indication: For home med    albuterol 90 mcg/inh inhalation aerosol  -- 2 puff(s) inhaled every 4 hours, As needed, Shortness of Breath and/or Wheezing  -- Indication: For home med    ferrous sulfate 325 mg (65 mg elemental iron) oral tablet  -- 1 tab(s) by mouth 3 times a day  -- Indication: For home med    docusate sodium 100 mg oral capsule  -- 1 cap(s) by mouth 2 times a day  -- Indication: For home med    pantoprazole 40 mg oral delayed release tablet  -- 1 tab(s) by mouth once a day (before a meal)  -- Indication: For home med    ONE-DAILY    TAB MULT VIT  -- 1  by mouth once a day  last dose 9/14  -- Indication: For home med    Vitamin D3 1000 intl units oral capsule  -- Indication: For home med

## 2017-09-22 NOTE — PATIENT PROFILE ADULT. - PMH
Arthritis    Asthma    Borderline personality disorder    Depression    Dysthymia    Herniated lumbar disc without myelopathy    Intra-abdominal and pelvic swelling, mass and lump, unspecified site    Obesity    PTSD (post-traumatic stress disorder)    Smoker    Substance abuse    Substance abuse in remission

## 2017-09-22 NOTE — PROGRESS NOTE ADULT - SUBJECTIVE AND OBJECTIVE BOX
R4 GYN ONC Note, POD#1    Patient seen and examined at bedside, no acute overnight events. No acute complaints, pain well controlled.  Patient is ambulating and tolerating crackers. Has not yet passed flatus. Mayberry removed at 5am, patient due to void. Denies CP, SOB, N/V, fevers, and chills.    Vital Signs Last 24 Hours  T(C): 37.2 (09-22-17 @ 05:14), Max: 37.2 (09-22-17 @ 05:14)  HR: 78 (09-22-17 @ 05:14) (43 - 97)  BP: 95/54 (09-22-17 @ 05:14) (95/54 - 131/77)  RR: 20 (09-22-17 @ 05:14) (12 - 22)  SpO2: 94% (09-22-17 @ 05:14) (92% - 100%)    I&O's Summary    21 Sep 2017 07:01  -  22 Sep 2017 06:48  --------------------------------------------------------  IN: 860 mL / OUT: 885 mL / NET: -25 mL      Physical Exam:  General: NAD  CV: NR, RR, S1, S2, no M/R/G  Lungs: CTA-B  Abdomen: Soft, non-tender, non-distended, +BS  Incision: port sites CDI  Ext: No pain or swelling    Labs:             11.7   12.61 )-----------( 233      ( 09-22 @ 05:30 )             36.2         MEDICATIONS  (STANDING):  gabapentin 800 milliGRAM(s) Oral three times a day  traZODone 300 milliGRAM(s) Oral at bedtime  pantoprazole    Tablet 40 milliGRAM(s) Oral before breakfast  sodium chloride 0.9% lock flush 3 milliLiter(s) IV Push every 8 hours  lactated ringers. 1000 milliLiter(s) (125 mL/Hr) IV Continuous <Continuous>  acetaminophen   Tablet. 975 milliGRAM(s) Oral every 6 hours  escitalopram 20 milliGRAM(s) Oral at bedtime  doxazosin 1 milliGRAM(s) Oral at bedtime  ibuprofen  Tablet 600 milliGRAM(s) Oral every 6 hours  QUEtiapine 400 milliGRAM(s) Oral at bedtime  topiramate 100 milliGRAM(s) Oral daily  buprenorphine 2 mG/naloxone 0.5 mG SL  Tablet 2 Tablet(s) SubLingual two times a day  lidocaine 2% Gel 1 Application(s) Topical once  influenza   Vaccine 0.5 milliLiter(s) IntraMuscular once    MEDICATIONS  (PRN):  ALBUTerol    90 MICROgram(s) HFA Inhaler 2 Puff(s) Inhalation every 6 hours PRN Shortness of Breath and/or Wheezing R4 GYN ONC Note, POD#1    Patient seen and examined at bedside, no acute overnight events. No acute complaints, pain well controlled.  Patient is ambulating and tolerating crackers. Has not yet passed flatus. Mayberry removed at 5am, patient due to void. Denies CP, SOB, N/V, fevers, and chills.    Vital Signs Last 24 Hours  T(C): 37.2 (09-22-17 @ 05:14), Max: 37.2 (09-22-17 @ 05:14)  HR: 78 (09-22-17 @ 05:14) (43 - 97)  BP: 95/54 (09-22-17 @ 05:14) (95/54 - 131/77)  RR: 20 (09-22-17 @ 05:14) (12 - 22)  SpO2: 94% (09-22-17 @ 05:14) (92% - 100%)    I&O's Summary    21 Sep 2017 07:01  -  22 Sep 2017 06:48  --------------------------------------------------------  IN: 860 mL / OUT: 885 mL / NET: -25 mL      Physical Exam:  General: NAD  CV: NR, RR, S1, S2, no M/R/G  Lungs: bilateral crackles  Abdomen: Soft, non-tender, non-distended, +BS  Incision: port sites CDI  Ext: No pain or swelling    Labs:             11.7   12.61 )-----------( 233      ( 09-22 @ 05:30 )             36.2         MEDICATIONS  (STANDING):  gabapentin 800 milliGRAM(s) Oral three times a day  traZODone 300 milliGRAM(s) Oral at bedtime  pantoprazole    Tablet 40 milliGRAM(s) Oral before breakfast  sodium chloride 0.9% lock flush 3 milliLiter(s) IV Push every 8 hours  lactated ringers. 1000 milliLiter(s) (125 mL/Hr) IV Continuous <Continuous>  acetaminophen   Tablet. 975 milliGRAM(s) Oral every 6 hours  escitalopram 20 milliGRAM(s) Oral at bedtime  doxazosin 1 milliGRAM(s) Oral at bedtime  ibuprofen  Tablet 600 milliGRAM(s) Oral every 6 hours  QUEtiapine 400 milliGRAM(s) Oral at bedtime  topiramate 100 milliGRAM(s) Oral daily  buprenorphine 2 mG/naloxone 0.5 mG SL  Tablet 2 Tablet(s) SubLingual two times a day  lidocaine 2% Gel 1 Application(s) Topical once  influenza   Vaccine 0.5 milliLiter(s) IntraMuscular once    MEDICATIONS  (PRN):  ALBUTerol    90 MICROgram(s) HFA Inhaler 2 Puff(s) Inhalation every 6 hours PRN Shortness of Breath and/or Wheezing

## 2017-09-23 ENCOUNTER — TRANSCRIPTION ENCOUNTER (OUTPATIENT)
Age: 42
End: 2017-09-23

## 2017-09-23 RX ORDER — BENZOCAINE AND MENTHOL 5; 1 G/100ML; G/100ML
1 LIQUID ORAL EVERY 4 HOURS
Qty: 0 | Refills: 0 | Status: DISCONTINUED | OUTPATIENT
Start: 2017-09-23 | End: 2017-09-25

## 2017-09-23 RX ORDER — BACITRACIN ZINC 500 UNIT/G
1 OINTMENT IN PACKET (EA) TOPICAL
Qty: 0 | Refills: 0 | Status: DISCONTINUED | OUTPATIENT
Start: 2017-09-23 | End: 2017-09-25

## 2017-09-23 RX ADMIN — BUPRENORPHINE AND NALOXONE 4 TABLET(S): 2; .5 TABLET SUBLINGUAL at 14:30

## 2017-09-23 RX ADMIN — BENZOCAINE AND MENTHOL 1 LOZENGE: 5; 1 LIQUID ORAL at 03:10

## 2017-09-23 RX ADMIN — GABAPENTIN 800 MILLIGRAM(S): 400 CAPSULE ORAL at 21:23

## 2017-09-23 RX ADMIN — Medication 975 MILLIGRAM(S): at 03:45

## 2017-09-23 RX ADMIN — SODIUM CHLORIDE 3 MILLILITER(S): 9 INJECTION INTRAMUSCULAR; INTRAVENOUS; SUBCUTANEOUS at 21:26

## 2017-09-23 RX ADMIN — Medication 100 MILLIGRAM(S): at 13:52

## 2017-09-23 RX ADMIN — Medication 600 MILLIGRAM(S): at 18:30

## 2017-09-23 RX ADMIN — Medication 1 APPLICATION(S): at 21:22

## 2017-09-23 RX ADMIN — Medication 300 MILLIGRAM(S): at 22:56

## 2017-09-23 RX ADMIN — QUETIAPINE FUMARATE 400 MILLIGRAM(S): 200 TABLET, FILM COATED ORAL at 21:24

## 2017-09-23 RX ADMIN — Medication 600 MILLIGRAM(S): at 18:07

## 2017-09-23 RX ADMIN — SODIUM CHLORIDE 3 MILLILITER(S): 9 INJECTION INTRAMUSCULAR; INTRAVENOUS; SUBCUTANEOUS at 13:49

## 2017-09-23 RX ADMIN — GABAPENTIN 800 MILLIGRAM(S): 400 CAPSULE ORAL at 05:37

## 2017-09-23 RX ADMIN — Medication 975 MILLIGRAM(S): at 09:18

## 2017-09-23 RX ADMIN — GABAPENTIN 800 MILLIGRAM(S): 400 CAPSULE ORAL at 13:52

## 2017-09-23 RX ADMIN — Medication 975 MILLIGRAM(S): at 09:20

## 2017-09-23 RX ADMIN — Medication 975 MILLIGRAM(S): at 22:15

## 2017-09-23 RX ADMIN — BUPRENORPHINE AND NALOXONE 4 TABLET(S): 2; .5 TABLET SUBLINGUAL at 14:10

## 2017-09-23 RX ADMIN — Medication 975 MILLIGRAM(S): at 03:08

## 2017-09-23 RX ADMIN — PANTOPRAZOLE SODIUM 40 MILLIGRAM(S): 20 TABLET, DELAYED RELEASE ORAL at 09:19

## 2017-09-23 RX ADMIN — Medication 1 APPLICATION(S): at 21:21

## 2017-09-23 RX ADMIN — ALBUTEROL 2 PUFF(S): 90 AEROSOL, METERED ORAL at 20:04

## 2017-09-23 RX ADMIN — Medication 975 MILLIGRAM(S): at 17:15

## 2017-09-23 RX ADMIN — ESCITALOPRAM OXALATE 20 MILLIGRAM(S): 10 TABLET, FILM COATED ORAL at 21:23

## 2017-09-23 RX ADMIN — Medication 600 MILLIGRAM(S): at 06:35

## 2017-09-23 RX ADMIN — Medication 600 MILLIGRAM(S): at 00:27

## 2017-09-23 RX ADMIN — Medication 600 MILLIGRAM(S): at 13:53

## 2017-09-23 RX ADMIN — Medication 975 MILLIGRAM(S): at 21:21

## 2017-09-23 RX ADMIN — Medication 600 MILLIGRAM(S): at 14:30

## 2017-09-23 RX ADMIN — Medication 975 MILLIGRAM(S): at 16:46

## 2017-09-23 RX ADMIN — SODIUM CHLORIDE 3 MILLILITER(S): 9 INJECTION INTRAMUSCULAR; INTRAVENOUS; SUBCUTANEOUS at 05:37

## 2017-09-23 RX ADMIN — HEPARIN SODIUM 5000 UNIT(S): 5000 INJECTION INTRAVENOUS; SUBCUTANEOUS at 13:53

## 2017-09-23 RX ADMIN — Medication 600 MILLIGRAM(S): at 05:36

## 2017-09-23 NOTE — PROGRESS NOTE ADULT - SUBJECTIVE AND OBJECTIVE BOX
R2 Gyn Onc Progress Note    Patient seen and examined at bedside, no acute overnight events. Complaining of some pain still, better than yesterday. Patient is passing flatus, tolerating regular diet, and voiding spontaneously. Ambulating with assistance. Denies CP, SOB, N/V, fevers, and chills.    Vital Signs Last 24 Hours  T(C): 36.7 (09-23-17 @ 05:43), Max: 37.1 (09-22-17 @ 09:21)  HR: 68 (09-23-17 @ 05:43) (67 - 81)  BP: 95/54 (09-23-17 @ 05:43) (87/50 - 100/50)  RR: 19 (09-23-17 @ 05:43) (19 - 22)  SpO2: 96% (09-23-17 @ 05:43) (95% - 97%)    I&O's Summary    22 Sep 2017 07:01  -  23 Sep 2017 07:00  --------------------------------------------------------  IN: 2250 mL / OUT: 775 mL / NET: 1475 mL      Physical Exam:  General: NAD  CV: NR, RR, S1, S2, no M/R/G  Lungs: CTA-B  Abdomen: Soft, appropriately tender, non-distended, +BS  Incision: Port sites CDI  Ext: No pain or swelling    Labs:             11.7   12.61<H> )-----------( 233      ( 09-22 @ 05:30 )             36.2           MEDICATIONS  (STANDING):  gabapentin 800 milliGRAM(s) Oral three times a day  traZODone 300 milliGRAM(s) Oral at bedtime  pantoprazole    Tablet 40 milliGRAM(s) Oral before breakfast  sodium chloride 0.9% lock flush 3 milliLiter(s) IV Push every 8 hours  acetaminophen   Tablet. 975 milliGRAM(s) Oral every 6 hours  escitalopram 20 milliGRAM(s) Oral at bedtime  doxazosin 1 milliGRAM(s) Oral at bedtime  ibuprofen  Tablet 600 milliGRAM(s) Oral every 6 hours  QUEtiapine 400 milliGRAM(s) Oral at bedtime  topiramate 100 milliGRAM(s) Oral daily  lidocaine 2% Gel 1 Application(s) Topical once  influenza   Vaccine 0.5 milliLiter(s) IntraMuscular once  buprenorphine 8 mG/naloxone 2 mG SL  Tablet 4 Tablet(s) SubLingual daily  heparin  Injectable 5000 Unit(s) SubCutaneous every 12 hours    MEDICATIONS  (PRN):  ALBUTerol    90 MICROgram(s) HFA Inhaler 2 Puff(s) Inhalation every 6 hours PRN Shortness of Breath and/or Wheezing  benzocaine 15 mG/menthol 3.6 mG Lozenge 1 Lozenge Oral three times a day PRN Sore Throat

## 2017-09-24 RX ADMIN — GABAPENTIN 800 MILLIGRAM(S): 400 CAPSULE ORAL at 23:54

## 2017-09-24 RX ADMIN — SODIUM CHLORIDE 3 MILLILITER(S): 9 INJECTION INTRAMUSCULAR; INTRAVENOUS; SUBCUTANEOUS at 22:40

## 2017-09-24 RX ADMIN — Medication 1 APPLICATION(S): at 17:33

## 2017-09-24 RX ADMIN — GABAPENTIN 800 MILLIGRAM(S): 400 CAPSULE ORAL at 17:33

## 2017-09-24 RX ADMIN — Medication 600 MILLIGRAM(S): at 01:19

## 2017-09-24 RX ADMIN — Medication 300 MILLIGRAM(S): at 22:40

## 2017-09-24 RX ADMIN — QUETIAPINE FUMARATE 400 MILLIGRAM(S): 200 TABLET, FILM COATED ORAL at 22:39

## 2017-09-24 RX ADMIN — BUPRENORPHINE AND NALOXONE 4 TABLET(S): 2; .5 TABLET SUBLINGUAL at 13:10

## 2017-09-24 RX ADMIN — SODIUM CHLORIDE 3 MILLILITER(S): 9 INJECTION INTRAMUSCULAR; INTRAVENOUS; SUBCUTANEOUS at 05:55

## 2017-09-24 RX ADMIN — Medication 975 MILLIGRAM(S): at 19:31

## 2017-09-24 RX ADMIN — SODIUM CHLORIDE 3 MILLILITER(S): 9 INJECTION INTRAMUSCULAR; INTRAVENOUS; SUBCUTANEOUS at 17:28

## 2017-09-24 RX ADMIN — Medication 975 MILLIGRAM(S): at 17:33

## 2017-09-24 RX ADMIN — Medication 1 APPLICATION(S): at 23:19

## 2017-09-24 RX ADMIN — Medication 975 MILLIGRAM(S): at 05:56

## 2017-09-24 RX ADMIN — ALBUTEROL 2 PUFF(S): 90 AEROSOL, METERED ORAL at 19:58

## 2017-09-24 RX ADMIN — Medication 600 MILLIGRAM(S): at 13:10

## 2017-09-24 RX ADMIN — Medication 600 MILLIGRAM(S): at 17:34

## 2017-09-24 RX ADMIN — Medication 1 APPLICATION(S): at 05:56

## 2017-09-24 RX ADMIN — Medication 600 MILLIGRAM(S): at 19:32

## 2017-09-24 RX ADMIN — PANTOPRAZOLE SODIUM 40 MILLIGRAM(S): 20 TABLET, DELAYED RELEASE ORAL at 09:33

## 2017-09-24 RX ADMIN — Medication 100 MILLIGRAM(S): at 12:41

## 2017-09-24 RX ADMIN — Medication 975 MILLIGRAM(S): at 13:10

## 2017-09-24 RX ADMIN — Medication 975 MILLIGRAM(S): at 12:39

## 2017-09-24 RX ADMIN — Medication 600 MILLIGRAM(S): at 23:54

## 2017-09-24 RX ADMIN — Medication 600 MILLIGRAM(S): at 05:56

## 2017-09-24 RX ADMIN — ESCITALOPRAM OXALATE 20 MILLIGRAM(S): 10 TABLET, FILM COATED ORAL at 22:39

## 2017-09-24 RX ADMIN — Medication 975 MILLIGRAM(S): at 23:54

## 2017-09-24 RX ADMIN — Medication 600 MILLIGRAM(S): at 12:40

## 2017-09-24 RX ADMIN — Medication 600 MILLIGRAM(S): at 00:51

## 2017-09-24 RX ADMIN — GABAPENTIN 800 MILLIGRAM(S): 400 CAPSULE ORAL at 05:56

## 2017-09-24 RX ADMIN — BUPRENORPHINE AND NALOXONE 4 TABLET(S): 2; .5 TABLET SUBLINGUAL at 12:40

## 2017-09-24 RX ADMIN — Medication 1 APPLICATION(S): at 12:40

## 2017-09-24 NOTE — PROGRESS NOTE ADULT - SUBJECTIVE AND OBJECTIVE BOX
R2 Gyn Onc Progress Note    Patient seen and examined at bedside, no acute overnight events. No acute complaints, pain well controlled. Patient is ambulating, passing flatus, tolerating regular diet, and voiding spontaneously. Denies CP, SOB, N/V, fevers, and chills.    Vital Signs Last 24 Hours  T(C): 36.2 (09-24-17 @ 05:59), Max: 37.3 (09-23-17 @ 21:28)  HR: 92 (09-24-17 @ 05:59) (73 - 92)  BP: 120/64 (09-24-17 @ 05:59) (102/69 - 121/51)  RR: 17 (09-24-17 @ 05:59) (16 - 18)  SpO2: 97% (09-24-17 @ 05:59) (95% - 100%)    I&O's Summary    23 Sep 2017 07:01  -  24 Sep 2017 07:00  --------------------------------------------------------  IN: 0 mL / OUT: 1850 mL / NET: -1850 mL      Physical Exam:  General: NAD  CV: NR, RR, S1, S2, no M/R/G  Lungs: CTA-B  Abdomen: Soft, appropriately tender, non-distended, +BS  Incision: Port sites CDI  Ext: No pain or swelling    Labs:             11.7   12.61<H> )-----------( 233      ( 09-22 @ 05:30 )             36.2         MEDICATIONS  (STANDING):  gabapentin 800 milliGRAM(s) Oral three times a day  traZODone 300 milliGRAM(s) Oral at bedtime  pantoprazole    Tablet 40 milliGRAM(s) Oral before breakfast  sodium chloride 0.9% lock flush 3 milliLiter(s) IV Push every 8 hours  acetaminophen   Tablet. 975 milliGRAM(s) Oral every 6 hours  escitalopram 20 milliGRAM(s) Oral at bedtime  doxazosin 1 milliGRAM(s) Oral at bedtime  ibuprofen  Tablet 600 milliGRAM(s) Oral every 6 hours  QUEtiapine 400 milliGRAM(s) Oral at bedtime  topiramate 100 milliGRAM(s) Oral daily  lidocaine 2% Gel 1 Application(s) Topical once  influenza   Vaccine 0.5 milliLiter(s) IntraMuscular once  buprenorphine 8 mG/naloxone 2 mG SL  Tablet 4 Tablet(s) SubLingual daily  heparin  Injectable 5000 Unit(s) SubCutaneous every 12 hours  BACItracin   Ointment 1 Application(s) Topical four times a day    MEDICATIONS  (PRN):  ALBUTerol    90 MICROgram(s) HFA Inhaler 2 Puff(s) Inhalation every 6 hours PRN Shortness of Breath and/or Wheezing  benzocaine 15 mG/menthol 3.6 mG Lozenge 1 Lozenge Oral three times a day PRN Sore Throat  benzocaine 15 mG/menthol 3.6 mG Lozenge 1 Lozenge Oral every 4 hours PRN Sore Throat

## 2017-09-24 NOTE — PROGRESS NOTE ADULT - PROBLEM SELECTOR PLAN 1
Neuro: cont po Tylenol and Motrin, cont patient home meds including gabapentin and Suboxone   CV: Hemodynamically stable, vital signs stable  Pulm: Saturating well on room air, encouraged incentive spirometry use and inhaler prn  GI: Cont regular diet  : Voiding spontaneously  Heme: c/w HSQ and SCDs for DVT ppx  Dispo: Pt d/c pending acceptance from her rehab facility    King Tolbert, PGY-2  Pager #[phone] ([facility]), [phone] ([place])

## 2017-09-24 NOTE — PROVIDER CONTACT NOTE (OTHER) - ACTION/TREATMENT ORDERED:
Will continue to monitor patient,  discuss importance of overnight vitals, assessments, and keeping with scheduled medications

## 2017-09-24 NOTE — PROVIDER CONTACT NOTE (OTHER) - RECOMMENDATIONS
Provider to discuss importance of overnight vitals, assessments, and keeping with scheduled medications

## 2017-09-25 VITALS
RESPIRATION RATE: 18 BRPM | HEART RATE: 75 BPM | TEMPERATURE: 98 F | DIASTOLIC BLOOD PRESSURE: 61 MMHG | OXYGEN SATURATION: 98 % | SYSTOLIC BLOOD PRESSURE: 126 MMHG

## 2017-09-25 DIAGNOSIS — Z98.890 OTHER SPECIFIED POSTPROCEDURAL STATES: ICD-10-CM

## 2017-09-25 RX ADMIN — Medication 100 MILLIGRAM(S): at 12:55

## 2017-09-25 RX ADMIN — Medication 600 MILLIGRAM(S): at 00:47

## 2017-09-25 RX ADMIN — Medication 600 MILLIGRAM(S): at 05:56

## 2017-09-25 RX ADMIN — BUPRENORPHINE AND NALOXONE 4 TABLET(S): 2; .5 TABLET SUBLINGUAL at 12:53

## 2017-09-25 RX ADMIN — Medication 975 MILLIGRAM(S): at 06:43

## 2017-09-25 RX ADMIN — Medication 600 MILLIGRAM(S): at 18:00

## 2017-09-25 RX ADMIN — INFLUENZA VIRUS VACCINE 0.5 MILLILITER(S): 15; 15; 15; 15 SUSPENSION INTRAMUSCULAR at 18:00

## 2017-09-25 RX ADMIN — Medication 975 MILLIGRAM(S): at 13:45

## 2017-09-25 RX ADMIN — BUPRENORPHINE AND NALOXONE 4 TABLET(S): 2; .5 TABLET SUBLINGUAL at 13:45

## 2017-09-25 RX ADMIN — Medication 975 MILLIGRAM(S): at 18:00

## 2017-09-25 RX ADMIN — Medication 975 MILLIGRAM(S): at 00:47

## 2017-09-25 RX ADMIN — SODIUM CHLORIDE 3 MILLILITER(S): 9 INJECTION INTRAMUSCULAR; INTRAVENOUS; SUBCUTANEOUS at 13:48

## 2017-09-25 RX ADMIN — GABAPENTIN 800 MILLIGRAM(S): 400 CAPSULE ORAL at 14:02

## 2017-09-25 RX ADMIN — Medication 600 MILLIGRAM(S): at 13:45

## 2017-09-25 RX ADMIN — SODIUM CHLORIDE 3 MILLILITER(S): 9 INJECTION INTRAMUSCULAR; INTRAVENOUS; SUBCUTANEOUS at 05:57

## 2017-09-25 RX ADMIN — Medication 1 APPLICATION(S): at 18:03

## 2017-09-25 RX ADMIN — Medication 1 APPLICATION(S): at 12:53

## 2017-09-25 RX ADMIN — GABAPENTIN 800 MILLIGRAM(S): 400 CAPSULE ORAL at 05:57

## 2017-09-25 RX ADMIN — Medication 975 MILLIGRAM(S): at 12:55

## 2017-09-25 RX ADMIN — PANTOPRAZOLE SODIUM 40 MILLIGRAM(S): 20 TABLET, DELAYED RELEASE ORAL at 05:57

## 2017-09-25 RX ADMIN — Medication 975 MILLIGRAM(S): at 05:56

## 2017-09-25 RX ADMIN — Medication 1 APPLICATION(S): at 06:43

## 2017-09-25 RX ADMIN — Medication 600 MILLIGRAM(S): at 12:54

## 2017-09-25 RX ADMIN — Medication 600 MILLIGRAM(S): at 06:43

## 2017-09-25 NOTE — PROGRESS NOTE ADULT - SUBJECTIVE AND OBJECTIVE BOX
R2 [facility] Progress Note POD#4   HD#5    Patient seen and examined at bedside, no acute overnight events. No acute complaints, pain well controlled.  Patient is ambulating, passing flatus, voiding spontaneously, and tolerating regular diet. Denies CP, SOB, N/V, fevers, and chills.    Vital Signs Last 24 Hours  T(C): 36.8 (09-25-17 @ 05:54), Max: 36.8 (09-24-17 @ 09:34)  HR: 78 (09-25-17 @ 05:54) (73 - 88)  BP: 101/50 (09-25-17 @ 05:54) (94/43 - 134/63)  RR: 18 (09-25-17 @ 05:54) (16 - 18)  SpO2: 98% (09-25-17 @ 05:54) (94% - 99%)    I&O's Summary    23 Sep 2017 07:01  -  24 Sep 2017 07:00  --------------------------------------------------------  IN: 0 mL / OUT: 1850 mL / NET: -1850 mL        Physical Exam:  General: NAD  CV: NR, RR, S1, S2, no M/R/G  Lungs: Diffuse R sided wheezes, good air movement b/l  Abdomen: Soft, appropriate post-op tenderness, non-distended, +BS  Incision: 4 port sites CDI with steri strips in place  Ext: No pain or swelling                Blood Type: B Positive      MEDICATIONS  (STANDING):  gabapentin 800 milliGRAM(s) Oral three times a day  traZODone 300 milliGRAM(s) Oral at bedtime  pantoprazole    Tablet 40 milliGRAM(s) Oral before breakfast  sodium chloride 0.9% lock flush 3 milliLiter(s) IV Push every 8 hours  acetaminophen   Tablet. 975 milliGRAM(s) Oral every 6 hours  escitalopram 20 milliGRAM(s) Oral at bedtime  doxazosin 1 milliGRAM(s) Oral at bedtime  ibuprofen  Tablet 600 milliGRAM(s) Oral every 6 hours  QUEtiapine 400 milliGRAM(s) Oral at bedtime  topiramate 100 milliGRAM(s) Oral daily  lidocaine 2% Gel 1 Application(s) Topical once  influenza   Vaccine 0.5 milliLiter(s) IntraMuscular once  buprenorphine 8 mG/naloxone 2 mG SL  Tablet 4 Tablet(s) SubLingual daily  heparin  Injectable 5000 Unit(s) SubCutaneous every 12 hours  BACItracin   Ointment 1 Application(s) Topical four times a day    MEDICATIONS  (PRN):  ALBUTerol    90 MICROgram(s) HFA Inhaler 2 Puff(s) Inhalation every 6 hours PRN Shortness of Breath and/or Wheezing  benzocaine 15 mG/menthol 3.6 mG Lozenge 1 Lozenge Oral three times a day PRN Sore Throat  benzocaine 15 mG/menthol 3.6 mG Lozenge 1 Lozenge Oral every 4 hours PRN Sore Throat

## 2017-09-25 NOTE — PROVIDER CONTACT NOTE (OTHER) - ASSESSMENT
As asked by Greer Sosa SW/Dir I arranged [facility] through [facility] # [id number]. P/U 6.30 Pt going to [facility] [id number], [place], spoke with  and confirmed the acceptance.
Pt denies any pain, no bleeding, no signs/symptoms of infection. Skin barrier applied,
Pt refusing disruptions ie vitals, assessments, medications, from 10pm-6am. Pt resting, no obvious distress.

## 2017-09-25 NOTE — PROGRESS NOTE ADULT - ATTENDING COMMENTS
D/C planning to residence facility 9/25.
F/U with Social Work re: discharge.
Pt seen and examined with team  no n/v/cp/sob - kayla reg diet  voiding trial  NAD  Abd soft/nt/nd incision c/d/i  ext NT  Plan  reg diet  voiding trial  continue po meds - home meds restarted  likely d/c home this am

## 2017-09-25 NOTE — PROGRESS NOTE ADULT - ASSESSMENT
41 y/o POD#4 s/p robotic TLH and b/l salpingectomy for fibroids.  Patient is hemodynamically stable, with pain well-controlled, and is meeting all appropriate post-op milestones. 41 y/o POD#4 s/p robotic TLH and b/l salpingectomy for fibroids.  Patient is hemodynamically stable, with pain well-controlled, and is meeting all appropriate post-op milestones since 9/23.  Awaiting transfer to rehab facility.

## 2017-09-25 NOTE — CHART NOTE - NSCHARTNOTEFT_GEN_A_CORE
R2 PM Rounds  POD#4   HD#5    Patient seen and examined at bedside, no acute events. No acute complaints, pain well controlled.  Patient is ambulating, passing flatus, having bowel movements, voiding spontaneously, and tolerating regular diet. Denies CP, SOB, N/V, fevers, and chills.  Patient awaiting rehab facility placement.    Vital Signs Last 24 Hours  T(C): 36.6 (09-25-17 @ 13:01), Max: 36.8 (09-25-17 @ 05:54)  HR: 78 (09-25-17 @ 13:01) (73 - 88)  BP: 131/48 (09-25-17 @ 13:01) (100/52 - 131/48)  RR: 18 (09-25-17 @ 13:01) (16 - 20)  SpO2: 86% (09-25-17 @ 13:01) (86% - 99%)    CAM Balbuena PGY2

## 2017-09-25 NOTE — PROGRESS NOTE ADULT - PROBLEM SELECTOR PLAN 1
Neuro: c/w tylenol and motrin as needed, c/w suboxone and other home pain medications  CV: hemodynamically stable  Pulm: saturating well on room air, encourage incentive spirometry and ambulation.  c/w albuterol as need  GI: continue with reg diet  : voiding adequately  Heme: ambulation, SCDs, and heparin for DVT ppx  Dispo: discharge pending acceptance to her rehab home    CAM Balbuena PGY2

## 2017-09-28 LAB — SURGICAL PATHOLOGY STUDY: SIGNIFICANT CHANGE UP

## 2017-10-05 PROBLEM — F43.10 POST-TRAUMATIC STRESS DISORDER, UNSPECIFIED: Chronic | Status: ACTIVE | Noted: 2017-09-21

## 2017-10-10 ENCOUNTER — APPOINTMENT (OUTPATIENT)
Dept: GYNECOLOGIC ONCOLOGY | Facility: CLINIC | Age: 42
End: 2017-10-10
Payer: MEDICAID

## 2017-10-10 DIAGNOSIS — N88.8 OTHER SPECIFIED NONINFLAMMATORY DISORDERS OF CERVIX UTERI: ICD-10-CM

## 2017-10-10 DIAGNOSIS — R19.00 INTRA-ABDOMINAL AND PELVIC SWELLING, MASS AND LUMP, UNSPECIFIED SITE: ICD-10-CM

## 2017-10-10 PROCEDURE — 99024 POSTOP FOLLOW-UP VISIT: CPT

## 2018-06-04 NOTE — H&P PST ADULT - NSANTHOSAYNRD_GEN_A_CORE
Never smoker No. KISHORE screening performed.  STOP BANG Legend: 0-2 = LOW Risk; 3-4 = INTERMEDIATE Risk; 5-8 = HIGH Risk

## 2018-07-01 ENCOUNTER — OUTPATIENT (OUTPATIENT)
Dept: OUTPATIENT SERVICES | Facility: HOSPITAL | Age: 43
LOS: 1 days | End: 2018-07-01
Payer: MEDICAID

## 2018-07-01 DIAGNOSIS — S82.892S OTHER FRACTURE OF LEFT LOWER LEG, SEQUELA: Chronic | ICD-10-CM

## 2018-07-01 DIAGNOSIS — Z90.49 ACQUIRED ABSENCE OF OTHER SPECIFIED PARTS OF DIGESTIVE TRACT: Chronic | ICD-10-CM

## 2018-07-19 DIAGNOSIS — Z71.89 OTHER SPECIFIED COUNSELING: ICD-10-CM

## 2018-07-20 PROBLEM — R19.00 INTRA-ABDOMINAL AND PELVIC SWELLING, MASS AND LUMP, UNSPECIFIED SITE: Chronic | Status: ACTIVE | Noted: 2017-09-14

## 2018-07-20 PROBLEM — F17.200 NICOTINE DEPENDENCE, UNSPECIFIED, UNCOMPLICATED: Chronic | Status: ACTIVE | Noted: 2017-09-14

## 2018-07-20 PROBLEM — E66.9 OBESITY, UNSPECIFIED: Chronic | Status: ACTIVE | Noted: 2017-09-14

## 2018-07-20 PROBLEM — F19.10 OTHER PSYCHOACTIVE SUBSTANCE ABUSE, UNCOMPLICATED: Chronic | Status: ACTIVE | Noted: 2017-09-14

## 2018-09-01 ENCOUNTER — OUTPATIENT (OUTPATIENT)
Dept: OUTPATIENT SERVICES | Facility: HOSPITAL | Age: 43
LOS: 1 days | End: 2018-09-01
Payer: MEDICAID

## 2018-09-01 DIAGNOSIS — S82.892S OTHER FRACTURE OF LEFT LOWER LEG, SEQUELA: Chronic | ICD-10-CM

## 2018-09-01 DIAGNOSIS — Z90.49 ACQUIRED ABSENCE OF OTHER SPECIFIED PARTS OF DIGESTIVE TRACT: Chronic | ICD-10-CM

## 2018-11-06 NOTE — PATIENT PROFILE ADULT. - PASSIVE COMMENT
Bambi left before her lead level was drawn today at her check up. It sounds like there was a misunderstanding and the MA told them they were all done.     Please call mom with an  and let her know that I put this in as a future order. They can do this any time, otherwise it is okay to wait until her 2.5 year check up as well.     Jill Tamez CPNP  
LMOM request return call.     Milly Jacques        
other residents of facility

## 2019-02-11 NOTE — ED PROVIDER NOTE - NS HIV RISK FACTOR YES
Review of Systems Health Update:   What is your biggest concern for today's visit?     GENERAL / CONSTITUTIONAL:  []  YES    [x]  NO   Excessive fatigue.  []  YES    [x]  NO   Unexplained weight loss   []  YES    [x]  NO   Have you traveled outside of the [place] in the past year?   If so, where:     EARS, NOSE, MOUTH AND THROAT:  []  YES    [x]  NO   Hoarseness or voice change.  []  YES    [x]  NO   Difficult or painful swallowing.    HEART:  [x]  YES    []  NO   Chest pain  []  YES    [x]  NO   Palpitation or irregular heart beat.    LUNGS:   []  YES    [x]  NO   Coughing up blood.   []  YES    [x]  NO   Chronic cough.  [x]  YES    []  NO   Wheezing.  [x]  YES    []  NO   Shortness of Breath    INTESTINAL SYSTEM:  []  YES    [x]  NO   Tarry (black) stools.  []  YES    [x]  NO   Recurrent abdominal pain.  []  YES    [x]  NO   Frequent nausea or vomiting.    URINARY SYSTEM:   []  YES    [x]  NO   Difficult or painful urination.  [x]  YES    []  NO   Urination more than once a night.  []  YES    [x]  NO   Bloody Urine    SKELETON AND JOINTS:  [x]  YES    [x]  NO   Swollen or painful joints.   []  YES    [x]  NO   Gout.   Which joints: shoulders,elbows,neck, Knees    SKIN:  []  YES    [x]  NO   Recurrent skin rash.   []  YES    [x]  NO   Moles that have changed in size or color.    NERVOUS SYSTEM:   []  YES    [x]  NO   Frequent or severe headaches.  []  YES    [x]  NO   Loss of consciousness/concussion.  []  YES    [x]  NO   Weakness or recurrent numbness or tingling in your arms or legs.    PSYCHIATRIC:  Depression Screening  Over the last two weeks, how often have you been bothered by any of the following problems?  [x]  YES    []  NO   Little interest or pleasure in doing things.    [x]  YES    []  NO   Feeling down, depressed or hopeless.   []  YES    [x]  NO   Feeling nervous, anxious or on edge.  []  YES    []  NO   Not being able to stop or control worrying.     ENDOCRINE:  []  YES    [x]  NO   History of  diabetes.  []  YES    [x]  NO   History of thyroid disease.     HEMATOLOGIC:   []  YES    [x]  NO   Swollen glands or lymph nodes.  []  YES    [x]  NO   History of anemia.   []  YES    [x]  NO   History of blood clots.    IMMUNE SYSTEM:  []  YES    [x]  NO   History of AIDS.  [x]  YES    []  NO   Asthma.    MALE HEALTH UPDATE:  []  YES    [x]  NO   Any problem with sexual function?  []  YES    [x]  NO   Weak urine stream.     LIFESTYLE HABITS:    []  YES    [x]  NO   Do you drink alcohol?   Quantity consumed per week on average: Beer 0 Drinks   []  YES    [x]  NO   In the past year have you ever drank or used drugs more than you meant to?  []  YES    [x]  NO   Have you felt you wanted or needed to cut down on your drinking or drug use in the past year?  []  YES    [x]  NO   Have you been annoyed by friends or family that suggested you cut back on your drinking or use of drugs?  []  YES    [x]  NO   Have you ever shared hypodermic needles?   []  YES    [x]  NO   Have you used street drugs in the past 2 years?   How many days per week do you exercise for 30 minutes or more: 7  [x]  YES    [x]  NO   Do you smoke?   If you are a former smoker when did you quit?    If you smoke, how many packs per day? 1pack   []  YES    [x]  NO   Are you interested in and/or ready to quit smoking?  []  YES    [x]  NO   Do you wear your seatbelt?    SEXUAL AND GENDER HISTORY:  [x]  MALE    []  FEMALE   Sex assigned at birth.  [x]  MALE    []  FEMALE   Present gender identity.   Do you identify as Heterosexual     []  YES    [x]  NO  []  PAST   Hormonal therapy  []  YES    [x]  NO   Do you have concerns about your sexual practices that you wish to discuss?  []  YES    [x]  NO   Do you want to be screened for AIDS?     SCREENING FOR INTIMATE PARTNER VIOLENCE:  How often does your partner physically hurt you? n/a  How often does your partner insult or talk down to you? n/a  How often does your partner threaten you with physical harm?  n/a  How often does your partner scream at you? n/a  [x]  YES    []  NO   Do you currently feel safe in your present living situation?                            Declined

## 2019-06-11 NOTE — H&P ADULT - NSHPPOACENTRALVENOUSCATHETER_GEN_ALL_CORE
[facility]  Cardiac Electrophysiology  Progress Note    Admission Date: 5/29/2019  Code Status: DNR   Attending Physician: Paz Pradhan MD   Expected Discharge Date: 6/11/2019  Principal Problem:Acute HF (heart failure)    Subjective:     Interval History: No VT/VF overnight still on 2 of Lidocaine, opening eyes and follows some commands per RN.    Review of Systems   Unable to perform ROS: intubated     Objective:     Vital Signs (Most Recent):  Temp: 98.2 °F (36.8 °C) (06/11/19 0900)  Pulse: 73 (06/11/19 0900)  Resp: 18 (06/11/19 0900)  BP: (!) 114/54 (06/11/19 0900)  SpO2: 98 % (06/11/19 0900) Vital Signs (24h Range):  Temp:  [97.2 °F (36.2 °C)-106 °F (41.1 °C)] 98.2 °F (36.8 °C)  Pulse:  [67-80] 73  Resp:  [3-19] 18  SpO2:  [96 %-100 %] 98 %  BP: ()/(52-75) 114/54     Weight: 92.1 kg (203 lb)  Body mass index is 31.79 kg/m².     SpO2: 98 %  O2 Device (Oxygen Therapy): ventilator    Physical Exam   Constitutional: He appears well-developed and well-nourished. No distress.   Sedated, intubated   HENT:   Head: Normocephalic and atraumatic.   Cardiovascular: Normal rate, regular rhythm, normal heart sounds and intact distal pulses. Exam reveals no gallop and no friction rub.   No murmur heard.  Pulses:       Radial pulses are 0 on the right side, and 0 on the left side.        Femoral pulses are 2+ on the left side.       Dorsalis pedis pulses are 0 on the right side, and 0 on the left side.        Posterior tibial pulses are 0 on the right side, and 0 on the left side.   Sternotomy, healed. Surrounding ecchymoses. Extremities cool to touch. Right CFA IABP, Right CFV TVP   Pulmonary/Chest:   Intubated. Mechanical breath sounds, bilateral crackles   Abdominal: Soft. Bowel sounds are normal. He exhibits no distension. There is no tenderness.   Musculoskeletal: He exhibits edema (1+ bilateral lower extremity edema).   Left foot dressing c/d/i. Left foot mottled   Skin: He is not  diaphoretic.       Significant Labs:   EP:   Recent Labs   Lab 06/10/19  0406  06/10/19  1931 06/11/19  0025 06/11/19  0342   *  134*   < > 135* 135* 135*  135*   K 3.9  3.9   < > 4.3 4.2 4.6  4.6     102   < > 105 105 105  105   CO2 21*  21*   < > 21* 20* 21*  21*   *  162*  162*   < > 186* 157* 172*  172*   BUN 20  20   < > 19 18 18  18   CREATININE 1.1  1.1   < > 1.3 1.2 1.3  1.3   CALCIUM 7.8*  7.8*   < > 7.8* 7.3* 7.8*  7.8*   PROT 4.9*  --   --   --  4.9*   ALBUMIN 2.5*  --   --   --  2.4*   BILITOT 0.6  --   --   --  0.5   ALKPHOS 88  --   --   --  103   AST 39  --   --   --  55*   ALT 27  --   --   --  26   ANIONGAP 11  11   < > 9 10 9  9   ESTGFRAFRICA >60.0  >60.0   < > >60.0 >60.0 >60.0  >60.0   EGFRNONAA >60.0  >60.0   < > 52.6* 58.0* 52.6*  52.6*   WBC 53.96*  53.96*  --   --   --  48.61*  48.61*   HGB 9.6*  9.6*  --   --   --  9.4*  9.4*   HCT 29.9*  29.9*  --   --   --  29.2*  29.2*   *  101*  --   --   --  83*  83*   INR 1.1  --   --   --  1.0    < > = values in this interval not displayed.       Significant Imaging: Echocardiogram:   Transthoracic echo (TTE) complete (Cupid Only):   Results for orders placed or performed during the hospital encounter of 05/29/19   Transthoracic echo (TTE) 2D with Color Flow   Result Value Ref Range    Ascending aorta 3.31 cm    STJ 3.12 cm    AV mean gradient 3.88 mmHg    Ao peak layo 1.40 m/s    Ao VTI 15.79 cm    IVRT 0.09 msec    IVS 0.80 0.6 - 1.1 cm    LA size 4.30 cm    Left Atrium Major Axis 5.50 cm    Left Atrium Minor Axis 5.50 cm    LVIDD 5.10 3.5 - 6.0 cm    LVIDS 4.40 (A) 2.1 - 4.0 cm    LVOT diameter 1.99 cm    LVOT peak VTI 9.16 cm    PW 0.70 0.6 - 1.1 cm    MV Peak A Layo 0.23 m/s    E wave decelartion time 229.40 msec    MV Peak E Layo 0.94 m/s    RA Major Axis 5.23 cm    RA Width 3.94 cm    RVDD 4.28 cm    Sinus 3.59 cm    TAPSE 1.23 cm    TR Max Layo 1.75 m/s    TDI LATERAL 0.03     TDI SEPTAL  0.03     LA WIDTH 4.30 cm    LV Diastolic Volume 107.96 mL    LV Systolic Volume 73.56 mL    RV S' 4.10 m/s    LVOT peak sharon [id number] m/s    LV LATERAL E/E' RATIO 31.33     LV SEPTAL E/E' RATIO 31.33     FS 14 %    LA volume 86.44 cm3    LV mass 129.43 g    Left Ventricle Relative Wall Thickness 0.27 cm    AV valve area 1.80 cm2    AV Velocity Ratio 0.61     AV index (prosthetic) 0.58     E/A ratio 4.09     Mean e' 0.03     LVOT area 3.11 cm2    LVOT stroke volume 28.48 cm3    AV peak gradient 7.84 mmHg    E/E' ratio 31.33     LV Systolic Volume Index 36.1 mL/m2    LV Diastolic Volume Index 53.05 mL/m2    LA Volume Index 42.5 mL/m2    LV Mass Index 63.6 g/m2    Triscuspid Valve Regurgitation Peak Gradient 12.25 mmHg    BSA 2.09 m2     Assessment and Plan:     VT (ventricular tachycardia)  Pt had LHC then developed AF was started on amiodarone and had PMVT s/p several shocks. Repeat LHC with patent stents. Initially was on lidocaine which was weaned off however on 6/8 had several episodes of MMVT and PMVT which required CPR, intubation and shocks. Has been rewarmed no events overnight, no VT, VF    Plan  Lidocaine titration per primary team  If patient improves will need to be switched to oral AAD  Keep K >4 Mg >2  Awaiting to see if patient wakes up  Continue Anticoagulation if no contraindications per primary    Atrial fibrillation and Focal atrial tachycardia  Initially had an episode of AF RVR then had PMVT. Now in sinus rhythm pacing at 58 had an episode of focal atrial tachycardia 6/8.     Plan  Continue AC for now.         Stefany Boyd MD  Cardiac Electrophysiology  [facility]   no

## 2019-08-28 DIAGNOSIS — Z71.89 OTHER SPECIFIED COUNSELING: ICD-10-CM

## 2019-11-19 NOTE — DISCHARGE NOTE ADULT - PLAN OF CARE
wellness Regular diet as tolerated, regular activity as tolerated, no heavy lifting for first two weeks.  Take tylenol and motrin as needed for pain control.  Nothing per vagina (ie no tampons, douching, or intercourse) until cleared by your doctor.  Shower only, no baths.  Please make an appointment to see Dr Kwon in 2 weeks.  Call your doctor or go to the ED if you have bleeding that does not stop, are unable to urinate, or have a fever >100.4. same name as above

## 2020-03-01 PROCEDURE — H0002: CPT

## 2020-10-01 PROCEDURE — G9005: CPT

## 2021-09-29 NOTE — ED PROVIDER NOTE - PMH
Arthritis    Asthma    Bipolar 1 disorder    Borderline personality disorder    Depression    Dysthymia    Herniated lumbar disc without myelopathy    Substance abuse no enlarged lymph nodes/no tender lymph nodes/no swelling of extremity

## 2022-12-13 ENCOUNTER — EMERGENCY (EMERGENCY)
Facility: HOSPITAL | Age: 47
LOS: 0 days | Discharge: HOME | End: 2022-12-13
Attending: EMERGENCY MEDICINE

## 2022-12-13 VITALS
WEIGHT: 229.94 LBS | HEIGHT: 65 IN | DIASTOLIC BLOOD PRESSURE: 62 MMHG | OXYGEN SATURATION: 100 % | SYSTOLIC BLOOD PRESSURE: 165 MMHG | TEMPERATURE: 98 F | HEART RATE: 72 BPM | RESPIRATION RATE: 18 BRPM

## 2022-12-13 DIAGNOSIS — R06.02 SHORTNESS OF BREATH: ICD-10-CM

## 2022-12-13 DIAGNOSIS — K21.9 GASTRO-ESOPHAGEAL REFLUX DISEASE WITHOUT ESOPHAGITIS: ICD-10-CM

## 2022-12-13 DIAGNOSIS — Z91.018 ALLERGY TO OTHER FOODS: ICD-10-CM

## 2022-12-13 DIAGNOSIS — Z90.49 ACQUIRED ABSENCE OF OTHER SPECIFIED PARTS OF DIGESTIVE TRACT: Chronic | ICD-10-CM

## 2022-12-13 DIAGNOSIS — Z20.822 CONTACT WITH AND (SUSPECTED) EXPOSURE TO COVID-19: ICD-10-CM

## 2022-12-13 DIAGNOSIS — S82.892S OTHER FRACTURE OF LEFT LOWER LEG, SEQUELA: Chronic | ICD-10-CM

## 2022-12-13 DIAGNOSIS — J45.901 UNSPECIFIED ASTHMA WITH (ACUTE) EXACERBATION: ICD-10-CM

## 2022-12-13 DIAGNOSIS — F17.200 NICOTINE DEPENDENCE, UNSPECIFIED, UNCOMPLICATED: ICD-10-CM

## 2022-12-13 LAB
FLUAV AG NPH QL: SIGNIFICANT CHANGE UP
FLUBV AG NPH QL: SIGNIFICANT CHANGE UP
SARS-COV-2 RNA SPEC QL NAA+PROBE: SIGNIFICANT CHANGE UP

## 2022-12-13 PROCEDURE — 99291 CRITICAL CARE FIRST HOUR: CPT

## 2022-12-13 RX ORDER — ALBUTEROL 90 UG/1
3 AEROSOL, METERED ORAL
Qty: 60 | Refills: 0
Start: 2022-12-13 | End: 2022-12-17

## 2022-12-13 RX ORDER — IPRATROPIUM/ALBUTEROL SULFATE 18-103MCG
3 AEROSOL WITH ADAPTER (GRAM) INHALATION ONCE
Refills: 0 | Status: COMPLETED | OUTPATIENT
Start: 2022-12-13 | End: 2022-12-13

## 2022-12-13 RX ORDER — ALBUTEROL 90 UG/1
1 AEROSOL, METERED ORAL ONCE
Refills: 0 | Status: COMPLETED | OUTPATIENT
Start: 2022-12-13 | End: 2022-12-13

## 2022-12-13 RX ORDER — ALBUTEROL 90 UG/1
4 AEROSOL, METERED ORAL
Qty: 1 | Refills: 0
Start: 2022-12-13 | End: 2022-12-14

## 2022-12-13 RX ADMIN — Medication 80 MILLIGRAM(S): at 05:09

## 2022-12-13 RX ADMIN — Medication 3 MILLILITER(S): at 05:08

## 2022-12-13 RX ADMIN — ALBUTEROL 1 PUFF(S): 90 AEROSOL, METERED ORAL at 06:37

## 2022-12-13 RX ADMIN — Medication 3 MILLILITER(S): at 05:56

## 2022-12-13 RX ADMIN — Medication 3 MILLILITER(S): at 05:15

## 2022-12-13 NOTE — ED ADULT NURSE NOTE - CAS EDN DISCHARGE ASSESSMENT
Alert and oriented to person, place and time/Patient baseline mental status/Awake/Drowsy/Symptoms improved

## 2022-12-13 NOTE — ED ADULT NURSE NOTE - SUICIDE SCREENING QUESTION 1
Chief Complaint   Patient presents with     Urgent Care     Musculoskeletal Problem     smashed left 3rd finger yesterday when she got it caught in a chair.      Imm/Inj     Flu Shot     SUBJECTIVE:  Jennifer Jane is a 69 year old female who presents to the clinic today with a left middle finger injury that occurred yesterday. She was sitting on a chair and reached down pinching her finger. She has an eraser sized skin tear chunk missing that is bleeding on and off. Declines any concern for fracture. Would like a tetanus shot today, last done in 2011.    Past Medical History:   Diagnosis Date     Arthritis     Fibromyalgia, ? Osteoarthritis     Back injury     Micro discectomy, approx date 1983     Hypercalcemia 11/30/2019     Insomnia 12/26/2012     Problem list name updated by automated process. Provider to review     Reduced vision 09/2016    L vision block, bilateral elevated eye presures,poss. Glacom     Uncomplicated asthma     Diagnosed as 1 yo     ADVAIR -21 MCG/ACT inhaler,   albuterol (VENTOLIN HFA) 108 (90 BASE) MCG/ACT inhaler, Inhale 2 puffs into the lungs every 6 hours as needed for shortness of breath / dyspnea  amLODIPine (NORVASC) 5 MG tablet, Take 1 tablet (5 mg) by mouth At Bedtime  atorvastatin (LIPITOR) 20 MG tablet, Take 1 tablet (20 mg) by mouth daily  azelastine (ASTELIN) 0.1 % nasal spray, Spray 1 spray into both nostrils 2 times daily  latanoprost (XALATAN) 0.005 % ophthalmic solution, Place 1 drop into the right eye At Bedtime  losartan (COZAAR) 50 MG tablet, Take 1 tablet (50 mg) by mouth At Bedtime  mupirocin (BACTROBAN) 2 % external ointment, Apply topically 3 times daily  amitriptyline (ELAVIL) 10 MG tablet, TAKE 1 TABLET BY MOUTH EVERYDAY AT BEDTIME  Calcium Carbonate Antacid (TUMS PO), Take  by mouth At Bedtime.  carboxymethylcellulose PF (REFRESH PLUS) 0.5 % ophthalmic solution, 1 drop 3 times daily as needed for dry eyes  cetirizine (ZYRTEC ALLERGY) 10 MG tablet, Take 10 mg  "by mouth daily.  DiphenhydrAMINE HCl (BENADRYL PO), Take 25 mg by mouth  ibuprofen (ADVIL,MOTRIN) 200 MG tablet, Take 1-2 tablets (200-400 mg) by mouth At Bedtime  montelukast (SINGULAIR) 10 MG tablet, TAKE ONE TABLET BY MOUTH EVERY DAY AS DIRECTED  pseudoePHEDrine (SUDAFED) 30 MG tablet, Take 30 mg by mouth as needed for congestion  saline nasal (AYR SALINE) GEL topical gel, Apply into each nare as needed for congestion  zinc 23 MG LOZG lozenge, Place 1 lozenge inside cheek 4 times daily    No current facility-administered medications on file prior to visit.    Social History     Tobacco Use     Smoking status: Never Smoker     Smokeless tobacco: Never Used   Substance Use Topics     Alcohol use: Yes     Alcohol/week: 0.0 standard drinks     Comment: 0 -2 times a year     Allergies   Allergen Reactions     Canola Oil [Vegetable Oil] Anaphylaxis and GI Disturbance     Animal Dander Unknown     Dust Mites Unknown     Grass      Hydroxyzine Other (See Comments)     Passed out         Pollen Extract Unknown     Trees      Vistaril      Chlorhexidine Itching and Rash     Hibiclens       Review of Systems   All systems negative except for those listed above in HPI.    EXAM:   /72   Pulse 80   Temp 98.7  F (37.1  C) (Oral)   Resp 16   Ht 1.651 m (5' 5\")   Wt 80.7 kg (178 lb)   LMP  (LMP Unknown)   SpO2 98%   BMI 29.62 kg/m      Physical Exam  Vitals reviewed.   Constitutional:       Appearance: Normal appearance.   HENT:      Head: Normocephalic and atraumatic.   Cardiovascular:      Rate and Rhythm: Normal rate.   Pulmonary:      Effort: Pulmonary effort is normal.   Musculoskeletal:         General: Swelling, tenderness and signs of injury present. Normal range of motion.      Comments: Left middle finger distal phalanx with eraser sized fleshy skin tear, minimal bleeding, abrasions. No bony tenderness.   Skin:     General: Skin is warm and dry.      Findings: Lesion present. No rash.   Neurological:      " General: No focal deficit present.      Mental Status: She is alert and oriented to person, place, and time.   Psychiatric:         Mood and Affect: Mood normal.         Behavior: Behavior normal.       ASSESSMENT:    ICD-10-CM    1. Laceration of left middle finger without foreign body without damage to nail, initial encounter  S61.213A REPAIR SUPERFICIAL, WOUND BODY < =2.5CM   2. Need for prophylactic vaccination and inoculation against influenza  Z23 INFLUENZA, QUAD, HIGH DOSE, PF, 65YR + (FLUZONE HD)     REPAIR SUPERFICIAL, WOUND BODY < =2.5CM     PLAN:    Wound cleansed and dressed in clinic  Wound care instructions given  Reevaluation if signs of infection such as pus, redness, warmth    Patient Instructions     Patient Education     Self-Care for Cuts, Scrapes, and Burns   Cuts, scrapes, and burns are hard to avoid. Most minor injuries can be treated at home. A small wound may threaten your health if it causes severe blood loss or becomes infected. Call your healthcare provider if a wound doesn t heal within a couple of weeks.  When should I call my healthcare provider?  Call your healthcare provider right away if:    You can t stop the bleeding.    The wound covers a large area, is deep, or you can see muscles, tendons or bones.    You can't move a part of an extremity such as a finger after a cut. This could mean that a tendon was cut    Your ear or eye is injured or burned.    The burn is larger than the size of your palm, or is on your neck, face, foot, groin, or your hand.    A puncture wound is deep or wide, or was caused by a dirty or arelis object.    You have signs of infection: fever, pus, pain, or redness.    It has been 10 years or more since your last tetanus shot.  Caring for cuts, scrapes, and puncture wounds  If you re caring for someone else, remember to protect yourself from illnesses carried in blood and body fluids. Use gloves or whatever else is available (a towel, perhaps) as a barrier  between you and the blood.  Step 1. Control bleeding    Apply direct pressure to a cut or scrape to stop bleeding.    Allow a minor puncture wound to stop bleeding on its own, unless the bleeding is heavy. This may help clean out the wound.  Step 2. Clean the wound    Kill germs and remove the dirt by washing the wound with clean, running water and soap.    Soak a minor puncture wound in warm, sudsy water for several minutes. Repeat this at least 2 times every day.  Step 3. Cover the injury    Hold the edges of a cut together with a butterfly bandage.    Apply antibiotic ointment.    For a cut or scrape, apply an adhesive bandage or clean gauze. Tape it in place.    Cover a minor puncture with gauze to absorb drainage and let in air to help with healing.    Treating minor burns    Cool the burn immediately. Otherwise, the skin continues to hold heat and will keep burning. Use cloths soaked in cool water, place the burned area under a gentle stream of cool water, or submerge the burn in a full sink or bucket.    Treat a minor burn like you treat a minor cut or scrape. Clean and cover it with a loose dressing.    Don't put butter, oil, or ointment on a burn. This only seals in heat. After you cool the area, you can apply a moisturizer with Aloe vera (with or without a numbing agent) to soothe the burn.    Don t break blisters or pull off skin from a broken blister. This skin helps protect the healing skin underneath.    [employer] last reviewed this educational content on 12/1/2019 2000-2021 [employer]. All rights reserved. This information is not intended as a substitute for professional medical care. Always follow your healthcare professional's instructions.             Follow up with primary care provider with any problems, questions or concerns or if symptoms worsen or fail to improve. Patient agreed to plan and verbalized understanding.    REGI Bustamante  [facility]  [place]   No

## 2022-12-13 NOTE — ED ADULT NURSE NOTE - TEMPLATE LIST FOR HEAD TO TOE ASSESSMENT
Actions Requested: Advised ED since pt repeatedly states having to run to BR every 5 minutes, and per based on symptoms and Clear Triage Protocol Advice (see below).  Advised to continue to hydrate well with Gateraide and water; pt verifies has someone to d and has signs of dehydration (e.g., no urine > 12 hours, very dry mouth, very lightheaded)    Negative Triage Questions:   * Shock suspected (e.g., cold/pale/clammy skin, too weak to stand)  * Difficult to awaken or acting confused (e.g., disoriented, slurr Respiratory

## 2022-12-13 NOTE — ED PROVIDER NOTE - CLINICAL SUMMARY MEDICAL DECISION MAKING FREE TEXT BOX
asthma exacerbation. needs nebs and steroids. asthma exacerbation. needs nebs and steroids.  after treatment, feeling much better. ok for dc home.

## 2022-12-13 NOTE — ED PROVIDER NOTE - OBJECTIVE STATEMENT
47F hx asthma, gerd pw sob and wheezing. started an attack last night 2/2 ot not having any albuterol left. always gets attacks at night. on spiriva. no fever. no vomiting. ros neg for ha, vision loss, rhinorrhea, rash, bleeding, dysuria, back pain, diarrhea, pregnancy, anxiety.

## 2022-12-13 NOTE — ED PROVIDER NOTE - PATIENT PORTAL LINK FT
You can access the FollowMyHealth Patient Portal offered by [facility] by registering at the following website: [email or web address]. By joining [employer]’s FollowMyHealth portal, you will also be able to view your health information using other applications (apps) compatible with our system.

## 2022-12-13 NOTE — ED ADULT TRIAGE NOTE - CHIEF COMPLAINT QUOTE
Pt complains of asthma attack and chronic pain to bilateral shoulders and knees. Pt with a history of asthma and arthritis.

## 2022-12-13 NOTE — ED PROVIDER NOTE - PHYSICAL EXAMINATION
Gen: Alert, NAD  Head: NC, AT   Eyes: PERRL, EOMI, normal lids/conjunctiva  ENT: normal hearing, patent oropharynx without erythema/exudate, uvula midline  Neck: supple, no tenderness, Trachea midline  Pulm: Bilateral BS, wheezing and coarse breathe sounds.   CV: RRR, no M/R/G, 2+ radial and dp pulses bl, no edema  Abd: soft, NT/ND, +BS, no hepatosplenomegaly  Mskel: extremities x4 with normal ROM and no joint effusions. no ctl spine ttp.   Skin: no rash, no bruising   Neuro: AAOx3, no sensory/motor deficits, CN 2-12 intact

## 2024-03-21 ENCOUNTER — OUTPATIENT (OUTPATIENT)
Dept: OUTPATIENT SERVICES | Facility: HOSPITAL | Age: 49
LOS: 1 days | Discharge: HOME | End: 2024-03-21
Payer: COMMERCIAL

## 2024-03-21 DIAGNOSIS — S82.892S OTHER FRACTURE OF LEFT LOWER LEG, SEQUELA: Chronic | ICD-10-CM

## 2024-03-21 DIAGNOSIS — Z90.49 ACQUIRED ABSENCE OF OTHER SPECIFIED PARTS OF DIGESTIVE TRACT: Chronic | ICD-10-CM

## 2024-09-28 ENCOUNTER — NON-APPOINTMENT (OUTPATIENT)
Age: 49
End: 2024-09-28

## 2024-10-09 ENCOUNTER — APPOINTMENT (OUTPATIENT)
Dept: DERMATOLOGY | Facility: CLINIC | Age: 49
End: 2024-10-09
Payer: MEDICAID

## 2024-10-09 DIAGNOSIS — L73.2 HIDRADENITIS SUPPURATIVA: ICD-10-CM

## 2024-10-09 PROCEDURE — 99204 OFFICE O/P NEW MOD 45 MIN: CPT

## 2024-10-09 RX ORDER — CLINDAMYCIN PHOSPHATE 10 MG/ML
1 LOTION TOPICAL TWICE DAILY
Qty: 1 | Refills: 3 | Status: ACTIVE | COMMUNITY
Start: 2024-10-09 | End: 1900-01-01

## 2024-10-09 RX ORDER — DOXYCYCLINE HYCLATE 100 MG/1
100 TABLET ORAL TWICE DAILY
Qty: 180 | Refills: 0 | Status: ACTIVE | COMMUNITY
Start: 2024-10-09 | End: 1900-01-01

## 2024-10-09 RX ORDER — METFORMIN HYDROCHLORIDE 500 MG/1
500 TABLET, COATED ORAL TWICE DAILY
Qty: 180 | Refills: 0 | Status: ACTIVE | COMMUNITY
Start: 2024-10-09 | End: 1900-01-01

## 2024-10-09 RX ORDER — SPIRONOLACTONE 50 MG/1
50 TABLET ORAL DAILY
Qty: 180 | Refills: 0 | Status: ACTIVE | COMMUNITY
Start: 2024-10-09 | End: 1900-01-01

## 2025-01-22 ENCOUNTER — APPOINTMENT (OUTPATIENT)
Dept: DERMATOLOGY | Facility: CLINIC | Age: 50
End: 2025-01-22